# Patient Record
Sex: MALE | Race: WHITE | NOT HISPANIC OR LATINO | Employment: FULL TIME | URBAN - METROPOLITAN AREA
[De-identification: names, ages, dates, MRNs, and addresses within clinical notes are randomized per-mention and may not be internally consistent; named-entity substitution may affect disease eponyms.]

---

## 2021-09-17 ENCOUNTER — HOSPITAL ENCOUNTER (EMERGENCY)
Facility: HOSPITAL | Age: 61
Discharge: ANOTHER HOSPITAL | End: 2021-09-17
Attending: EMERGENCY MEDICINE | Admitting: EMERGENCY MEDICINE

## 2021-09-17 ENCOUNTER — APPOINTMENT (OUTPATIENT)
Dept: CARDIOLOGY | Facility: HOSPITAL | Age: 61
End: 2021-09-17

## 2021-09-17 ENCOUNTER — APPOINTMENT (OUTPATIENT)
Dept: CT IMAGING | Facility: HOSPITAL | Age: 61
End: 2021-09-17

## 2021-09-17 ENCOUNTER — HOSPITAL ENCOUNTER (INPATIENT)
Facility: HOSPITAL | Age: 61
LOS: 2 days | Discharge: HOME | End: 2021-09-19
Attending: INTERNAL MEDICINE | Admitting: INTERNAL MEDICINE

## 2021-09-17 VITALS
HEIGHT: 70 IN | DIASTOLIC BLOOD PRESSURE: 76 MMHG | OXYGEN SATURATION: 93 % | HEART RATE: 65 BPM | TEMPERATURE: 97.5 F | BODY MASS INDEX: 30.78 KG/M2 | RESPIRATION RATE: 15 BRPM | SYSTOLIC BLOOD PRESSURE: 116 MMHG | WEIGHT: 215 LBS

## 2021-09-17 DIAGNOSIS — R55 SYNCOPE, UNSPECIFIED SYNCOPE TYPE: Primary | ICD-10-CM

## 2021-09-17 DIAGNOSIS — I26.09 OTHER ACUTE PULMONARY EMBOLISM WITH ACUTE COR PULMONALE (HCC): ICD-10-CM

## 2021-09-17 LAB
ALBUMIN SERPL-MCNC: 4 G/DL (ref 3.5–5.2)
ALBUMIN/GLOB SERPL: 1.3 G/DL
ALP SERPL-CCNC: 105 U/L (ref 39–117)
ALT SERPL W P-5'-P-CCNC: 22 U/L (ref 1–41)
ANION GAP SERPL CALCULATED.3IONS-SCNC: 15 MMOL/L (ref 5–15)
APTT PPP: 22.8 SECONDS (ref 61–76.5)
AST SERPL-CCNC: 40 U/L (ref 1–40)
BASOPHILS # BLD AUTO: 0.1 10*3/MM3 (ref 0–0.2)
BASOPHILS NFR BLD AUTO: 0.8 % (ref 0–1.5)
BH CV ECHO MEAS - ACS: 2.1 CM
BH CV ECHO MEAS - AO MAX PG (FULL): 0.8 MMHG
BH CV ECHO MEAS - AO MAX PG: 5.7 MMHG
BH CV ECHO MEAS - AO MEAN PG (FULL): 0.52 MMHG
BH CV ECHO MEAS - AO MEAN PG: 2.3 MMHG
BH CV ECHO MEAS - AO ROOT AREA (BSA CORRECTED): 1.4
BH CV ECHO MEAS - AO ROOT AREA: 6.9 CM^2
BH CV ECHO MEAS - AO ROOT DIAM: 3 CM
BH CV ECHO MEAS - AO V2 MAX: 118.9 CM/SEC
BH CV ECHO MEAS - AO V2 MEAN: 68.8 CM/SEC
BH CV ECHO MEAS - AO V2 VTI: 19.4 CM
BH CV ECHO MEAS - ASC AORTA: 3.2 CM
BH CV ECHO MEAS - AVA(I,A): 3.3 CM^2
BH CV ECHO MEAS - AVA(I,D): 3.3 CM^2
BH CV ECHO MEAS - AVA(V,A): 3 CM^2
BH CV ECHO MEAS - AVA(V,D): 3 CM^2
BH CV ECHO MEAS - BSA(HAYCOCK): 2.2 M^2
BH CV ECHO MEAS - BSA: 2.2 M^2
BH CV ECHO MEAS - BZI_BMI: 30.8 KILOGRAMS/M^2
BH CV ECHO MEAS - BZI_METRIC_HEIGHT: 177.8 CM
BH CV ECHO MEAS - BZI_METRIC_WEIGHT: 97.5 KG
BH CV ECHO MEAS - EDV(CUBED): 87.5 ML
BH CV ECHO MEAS - EDV(MOD-SP4): 86.3 ML
BH CV ECHO MEAS - EDV(TEICH): 89.6 ML
BH CV ECHO MEAS - EF(CUBED): 80.5 %
BH CV ECHO MEAS - EF(MOD-BP): 70 %
BH CV ECHO MEAS - EF(MOD-SP4): 77.5 %
BH CV ECHO MEAS - EF(TEICH): 73.1 %
BH CV ECHO MEAS - ESV(CUBED): 17.1 ML
BH CV ECHO MEAS - ESV(MOD-SP4): 19.4 ML
BH CV ECHO MEAS - ESV(TEICH): 24 ML
BH CV ECHO MEAS - FS: 42 %
BH CV ECHO MEAS - IVS/LVPW: 1
BH CV ECHO MEAS - IVSD: 1.2 CM
BH CV ECHO MEAS - LA DIMENSION(2D): 3.2 CM
BH CV ECHO MEAS - LV DIASTOLIC VOL/BSA (35-75): 40.1 ML/M^2
BH CV ECHO MEAS - LV MASS(C)D: 196.8 GRAMS
BH CV ECHO MEAS - LV MASS(C)DI: 91.4 GRAMS/M^2
BH CV ECHO MEAS - LV MAX PG: 4.9 MMHG
BH CV ECHO MEAS - LV MEAN PG: 1.8 MMHG
BH CV ECHO MEAS - LV SYSTOLIC VOL/BSA (12-30): 9 ML/M^2
BH CV ECHO MEAS - LV V1 MAX: 110.2 CM/SEC
BH CV ECHO MEAS - LV V1 MEAN: 59.5 CM/SEC
BH CV ECHO MEAS - LV V1 VTI: 20 CM
BH CV ECHO MEAS - LVIDD: 4.4 CM
BH CV ECHO MEAS - LVIDS: 2.6 CM
BH CV ECHO MEAS - LVOT AREA: 3.2 CM^2
BH CV ECHO MEAS - LVOT DIAM: 2 CM
BH CV ECHO MEAS - LVPWD: 1.2 CM
BH CV ECHO MEAS - MV A MAX VEL: 60.5 CM/SEC
BH CV ECHO MEAS - MV DEC SLOPE: 220.9 CM/SEC^2
BH CV ECHO MEAS - MV DEC TIME: 0.3 SEC
BH CV ECHO MEAS - MV E MAX VEL: 65.3 CM/SEC
BH CV ECHO MEAS - MV E/A: 1.1
BH CV ECHO MEAS - MV MAX PG: 2.1 MMHG
BH CV ECHO MEAS - MV MEAN PG: 0.72 MMHG
BH CV ECHO MEAS - MV V2 MAX: 72.6 CM/SEC
BH CV ECHO MEAS - MV V2 MEAN: 39.7 CM/SEC
BH CV ECHO MEAS - MV V2 VTI: 19.8 CM
BH CV ECHO MEAS - MVA(VTI): 3.3 CM^2
BH CV ECHO MEAS - PA MAX PG (FULL): 0.78 MMHG
BH CV ECHO MEAS - PA MAX PG: 1.8 MMHG
BH CV ECHO MEAS - PA MEAN PG (FULL): 0.51 MMHG
BH CV ECHO MEAS - PA MEAN PG: 1.1 MMHG
BH CV ECHO MEAS - PA V2 MAX: 68 CM/SEC
BH CV ECHO MEAS - PA V2 MEAN: 49.5 CM/SEC
BH CV ECHO MEAS - PA V2 VTI: 16.2 CM
BH CV ECHO MEAS - PULM A REVS DUR: 0.14 SEC
BH CV ECHO MEAS - PULM A REVS VEL: 39.2 CM/SEC
BH CV ECHO MEAS - PULM DIAS VEL: 42.1 CM/SEC
BH CV ECHO MEAS - PULM S/D: 1.3
BH CV ECHO MEAS - PULM SYS VEL: 55.6 CM/SEC
BH CV ECHO MEAS - PVA(I,A): 1.2 CM^2
BH CV ECHO MEAS - PVA(I,D): 1.2 CM^2
BH CV ECHO MEAS - PVA(V,A): 1.4 CM^2
BH CV ECHO MEAS - PVA(V,D): 1.4 CM^2
BH CV ECHO MEAS - QP/QS: 0.29
BH CV ECHO MEAS - RAP SYSTOLE: 15 MMHG
BH CV ECHO MEAS - RV MAX PG: 1.1 MMHG
BH CV ECHO MEAS - RV MEAN PG: 0.56 MMHG
BH CV ECHO MEAS - RV V1 MAX: 51.7 CM/SEC
BH CV ECHO MEAS - RV V1 MEAN: 34.6 CM/SEC
BH CV ECHO MEAS - RV V1 VTI: 10.5 CM
BH CV ECHO MEAS - RVDD: 3.7 CM
BH CV ECHO MEAS - RVOT AREA: 1.8 CM^2
BH CV ECHO MEAS - RVOT DIAM: 1.5 CM
BH CV ECHO MEAS - RVSP: 77.4 MMHG
BH CV ECHO MEAS - SI(AO): 62.2 ML/M^2
BH CV ECHO MEAS - SI(CUBED): 32.7 ML/M^2
BH CV ECHO MEAS - SI(LVOT): 30 ML/M^2
BH CV ECHO MEAS - SI(MOD-SP4): 31.1 ML/M^2
BH CV ECHO MEAS - SI(TEICH): 30.4 ML/M^2
BH CV ECHO MEAS - SV(AO): 133.9 ML
BH CV ECHO MEAS - SV(CUBED): 70.4 ML
BH CV ECHO MEAS - SV(LVOT): 64.6 ML
BH CV ECHO MEAS - SV(MOD-SP4): 66.9 ML
BH CV ECHO MEAS - SV(RVOT): 18.9 ML
BH CV ECHO MEAS - SV(TEICH): 65.5 ML
BH CV ECHO MEAS - TR MAX VEL: 394.8 CM/SEC
BILIRUB SERPL-MCNC: 0.5 MG/DL (ref 0–1.2)
BUN SERPL-MCNC: 19 MG/DL (ref 6–20)
BUN/CREAT SERPL: 14.7 (ref 7–25)
CALCIUM SPEC-SCNC: 9 MG/DL (ref 8.6–10.5)
CHLORIDE SERPL-SCNC: 103 MMOL/L (ref 98–107)
CO2 SERPL-SCNC: 22 MMOL/L (ref 22–29)
CREAT SERPL-MCNC: 1.29 MG/DL (ref 0.76–1.27)
D DIMER PPP FEU-MCNC: 10.58 MG/L (FEU) (ref 0–0.59)
DEPRECATED RDW RBC AUTO: 40.7 FL (ref 37–54)
EOSINOPHIL # BLD AUTO: 0.2 10*3/MM3 (ref 0–0.4)
EOSINOPHIL NFR BLD AUTO: 2.1 % (ref 0.3–6.2)
ERYTHROCYTE [DISTWIDTH] IN BLOOD BY AUTOMATED COUNT: 13 % (ref 12.3–15.4)
GFR SERPL CREATININE-BSD FRML MDRD: 57 ML/MIN/1.73
GLOBULIN UR ELPH-MCNC: 3.2 GM/DL
GLUCOSE SERPL-MCNC: 129 MG/DL (ref 65–99)
HCT VFR BLD AUTO: 42.1 % (ref 37.5–51)
HGB BLD-MCNC: 14.3 G/DL (ref 13–17.7)
INR PPP: 1.03 (ref 0.93–1.1)
LV EF 2D ECHO EST: 70 %
LYMPHOCYTES # BLD AUTO: 2.6 10*3/MM3 (ref 0.7–3.1)
LYMPHOCYTES NFR BLD AUTO: 31.7 % (ref 19.6–45.3)
MCH RBC QN AUTO: 30.7 PG (ref 26.6–33)
MCHC RBC AUTO-ENTMCNC: 34 G/DL (ref 31.5–35.7)
MCV RBC AUTO: 90.3 FL (ref 79–97)
MONOCYTES # BLD AUTO: 0.7 10*3/MM3 (ref 0.1–0.9)
MONOCYTES NFR BLD AUTO: 8.8 % (ref 5–12)
NEUTROPHILS NFR BLD AUTO: 4.7 10*3/MM3 (ref 1.7–7)
NEUTROPHILS NFR BLD AUTO: 56.6 % (ref 42.7–76)
NRBC BLD AUTO-RTO: 0 /100 WBC (ref 0–0.2)
PLATELET # BLD AUTO: 151 10*3/MM3 (ref 140–450)
PMV BLD AUTO: 7.7 FL (ref 6–12)
POTASSIUM SERPL-SCNC: 3.6 MMOL/L (ref 3.5–5.2)
PROT SERPL-MCNC: 7.2 G/DL (ref 6–8.5)
PROTHROMBIN TIME: 11.4 SECONDS (ref 9.6–11.7)
RBC # BLD AUTO: 4.67 10*6/MM3 (ref 4.14–5.8)
SARS-COV-2 RNA PNL SPEC NAA+PROBE: NOT DETECTED
SODIUM SERPL-SCNC: 140 MMOL/L (ref 136–145)
TROPONIN T SERPL-MCNC: <0.01 NG/ML (ref 0–0.03)
WBC # BLD AUTO: 8.2 10*3/MM3 (ref 3.4–10.8)

## 2021-09-17 PROCEDURE — 85379 FIBRIN DEGRADATION QUANT: CPT | Performed by: EMERGENCY MEDICINE

## 2021-09-17 PROCEDURE — 25010000002 HEPARIN (PORCINE) 25000-0.45 UT/250ML-% SOLUTION: Performed by: EMERGENCY MEDICINE

## 2021-09-17 PROCEDURE — 80061 LIPID PANEL: CPT | Performed by: INTERNAL MEDICINE

## 2021-09-17 PROCEDURE — 71275 CT ANGIOGRAPHY CHEST: CPT

## 2021-09-17 PROCEDURE — 96361 HYDRATE IV INFUSION ADD-ON: CPT

## 2021-09-17 PROCEDURE — 96366 THER/PROPH/DIAG IV INF ADDON: CPT

## 2021-09-17 PROCEDURE — 96376 TX/PRO/DX INJ SAME DRUG ADON: CPT

## 2021-09-17 PROCEDURE — 96365 THER/PROPH/DIAG IV INF INIT: CPT

## 2021-09-17 PROCEDURE — 93306 TTE W/DOPPLER COMPLETE: CPT | Performed by: INTERNAL MEDICINE

## 2021-09-17 PROCEDURE — 85025 COMPLETE CBC W/AUTO DIFF WBC: CPT | Performed by: EMERGENCY MEDICINE

## 2021-09-17 PROCEDURE — 80053 COMPREHEN METABOLIC PANEL: CPT | Performed by: EMERGENCY MEDICINE

## 2021-09-17 PROCEDURE — 93005 ELECTROCARDIOGRAM TRACING: CPT | Performed by: EMERGENCY MEDICINE

## 2021-09-17 PROCEDURE — 84484 ASSAY OF TROPONIN QUANT: CPT | Performed by: EMERGENCY MEDICINE

## 2021-09-17 PROCEDURE — 93306 TTE W/DOPPLER COMPLETE: CPT

## 2021-09-17 PROCEDURE — 85610 PROTHROMBIN TIME: CPT | Performed by: EMERGENCY MEDICINE

## 2021-09-17 PROCEDURE — 0 IOPAMIDOL PER 1 ML: Performed by: EMERGENCY MEDICINE

## 2021-09-17 PROCEDURE — 99283 EMERGENCY DEPT VISIT LOW MDM: CPT

## 2021-09-17 PROCEDURE — 85730 THROMBOPLASTIN TIME PARTIAL: CPT | Performed by: EMERGENCY MEDICINE

## 2021-09-17 PROCEDURE — 93005 ELECTROCARDIOGRAM TRACING: CPT

## 2021-09-17 PROCEDURE — 87635 SARS-COV-2 COVID-19 AMP PRB: CPT | Performed by: EMERGENCY MEDICINE

## 2021-09-17 PROCEDURE — 86140 C-REACTIVE PROTEIN: CPT | Performed by: INTERNAL MEDICINE

## 2021-09-17 RX ORDER — SODIUM CHLORIDE, SODIUM LACTATE, POTASSIUM CHLORIDE, CALCIUM CHLORIDE 600; 310; 30; 20 MG/100ML; MG/100ML; MG/100ML; MG/100ML
125 INJECTION, SOLUTION INTRAVENOUS CONTINUOUS
Status: DISCONTINUED | OUTPATIENT
Start: 2021-09-17 | End: 2021-09-17 | Stop reason: HOSPADM

## 2021-09-17 RX ORDER — IBUPROFEN 200 MG
200 TABLET ORAL EVERY 6 HOURS PRN
COMMUNITY
End: 2021-09-19 | Stop reason: HOSPADM

## 2021-09-17 RX ORDER — ESCITALOPRAM OXALATE 20 MG/1
20 TABLET ORAL DAILY
COMMUNITY
Start: 2021-09-15

## 2021-09-17 RX ORDER — LORATADINE 10 MG/1
10 TABLET ORAL DAILY
COMMUNITY

## 2021-09-17 RX ORDER — HEPARIN SODIUM 10000 [USP'U]/100ML
15.4 INJECTION, SOLUTION INTRAVENOUS
Status: DISCONTINUED | OUTPATIENT
Start: 2021-09-17 | End: 2021-09-17 | Stop reason: HOSPADM

## 2021-09-17 RX ORDER — TRIAMCINOLONE ACETONIDE 55 UG/1
2 SPRAY, METERED NASAL DAILY
COMMUNITY

## 2021-09-17 RX ADMIN — HEPARIN SODIUM 15.4 UNITS/KG/HR: 10000 INJECTION, SOLUTION INTRAVENOUS at 15:05

## 2021-09-17 RX ADMIN — SODIUM CHLORIDE, POTASSIUM CHLORIDE, SODIUM LACTATE AND CALCIUM CHLORIDE 125 ML/HR: 600; 310; 30; 20 INJECTION, SOLUTION INTRAVENOUS at 13:18

## 2021-09-17 RX ADMIN — IOPAMIDOL 100 ML: 755 INJECTION, SOLUTION INTRAVENOUS at 14:16

## 2021-09-17 NOTE — ED NOTES
Spoke with [facility] about completing imaging on the pt. [facility] stated that they would come take care of it.     Milagros Hines, RN  09/17/21 0956

## 2021-09-17 NOTE — ED NOTES
Spoke with Interventional Cardiology answering service who stated that they were not on call. Informed them that we have already spoken to Cardiology here at [facility].     Milagros Hines, RN  09/17/21 3044

## 2021-09-17 NOTE — ED PROVIDER NOTES
Subjective   History of Present Illness  Context: 60-year-old male presents with complaints of syncope.  He states he had been pushing his lawnmower.  He had brief palpitation.  He states he was sweaty and had some shortness of breath.  He states that he was walking and then woke up on the ground.  He states he did not have a warning that he was going to pass out.  He states he noted some shortness of breath beginning of [place] but thought it was related to the altitude he states that he felt okay following that actually did cardio for a couple days but then noticed some shortness of breath last night.  He states he worked out this morning and developed shortness of breath again.  He does not complain of pain.  Location: Generalized  Quality: Syncope  Duration: Today  Timin episode  Severity: Occurred without warning   Modifying factors:  Associated signs and symptoms: Dyspnea reports he has had some congestion that he was taken Claritin for and Nasalide    Review of Systems   Constitutional: Positive for diaphoresis. Negative for fever and unexpected weight change.   HENT: Positive for congestion. Negative for sore throat.    Eyes: Negative for pain.   Respiratory: Positive for shortness of breath. Negative for cough.    Cardiovascular: Negative for chest pain and leg swelling.   Gastrointestinal: Negative for abdominal pain, blood in stool, diarrhea and vomiting.   Genitourinary: Negative for dysuria and urgency.   Musculoskeletal: Negative for back pain.   Skin: Negative for rash.   Neurological: Positive for syncope. Negative for dizziness, weakness and headaches.   Psychiatric/Behavioral: Negative for confusion.       No past medical history on file.  Depression  Allergies   Allergen Reactions   • Penicillins Rash       No past surgical history on file.    No family history on file.    Social History     Socioeconomic History   • Marital status:      Spouse name: Not on file   •  Number of children: Not on file   • Years of education: Not on file   • Highest education level: Not on file     Current medications Lexapro creatine Claritin Nasalide      Objective   Physical Exam  60-year-old male awake alert.  Generally pale.  Pupils equal round at light.  Oropharynx mucous membranes moist neck supple chest clear cardiovascular rate and rhythm abdomen soft without localized tenderness mass rebound or guarding extremities no tenderness edema.  Skin without rash noted.  Neurologic exam without focal findings noted motor sensory grossly intact to extremities  Procedures           ED Course      Results for orders placed or performed during the hospital encounter of 09/17/21   Comprehensive Metabolic Panel    Specimen: Blood   Result Value Ref Range    Glucose 129 (H) 65 - 99 mg/dL    BUN 19 6 - 20 mg/dL    Creatinine 1.29 (H) 0.76 - 1.27 mg/dL    Sodium 140 136 - 145 mmol/L    Potassium 3.6 3.5 - 5.2 mmol/L    Chloride 103 98 - 107 mmol/L    CO2 22.0 22.0 - 29.0 mmol/L    Calcium 9.0 8.6 - 10.5 mg/dL    Total Protein 7.2 6.0 - 8.5 g/dL    Albumin 4.00 3.50 - 5.20 g/dL    ALT (SGPT) 22 1 - 41 U/L    AST (SGOT) 40 1 - 40 U/L    Alkaline Phosphatase 105 39 - 117 U/L    Total Bilirubin 0.5 0.0 - 1.2 mg/dL    eGFR Non African Amer 57 (L) >60 mL/min/1.73    Globulin 3.2 gm/dL    A/G Ratio 1.3 g/dL    BUN/Creatinine Ratio 14.7 7.0 - 25.0    Anion Gap 15.0 5.0 - 15.0 mmol/L   Troponin    Specimen: Blood   Result Value Ref Range    Troponin T <0.010 0.000 - 0.030 ng/mL   D-dimer, Quantitative    Specimen: Blood   Result Value Ref Range    D-Dimer, Quantitative 10.58 (H) 0.00 - 0.59 mg/L (FEU)   CBC Auto Differential    Specimen: Blood   Result Value Ref Range    WBC 8.20 3.40 - 10.80 10*3/mm3    RBC 4.67 4.14 - 5.80 10*6/mm3    Hemoglobin 14.3 13.0 - 17.7 g/dL    Hematocrit 42.1 37.5 - 51.0 %    MCV 90.3 79.0 - 97.0 fL    MCH 30.7 26.6 - 33.0 pg    MCHC 34.0 31.5 - 35.7 g/dL    RDW 13.0 12.3 - 15.4 %     "RDW-SD 40.7 37.0 - 54.0 fl    MPV 7.7 6.0 - 12.0 fL    Platelets 151 140 - 450 10*3/mm3    Neutrophil % 56.6 42.7 - 76.0 %    Lymphocyte % 31.7 19.6 - 45.3 %    Monocyte % 8.8 5.0 - 12.0 %    Eosinophil % 2.1 0.3 - 6.2 %    Basophil % 0.8 0.0 - 1.5 %    Neutrophils, Absolute 4.70 1.70 - 7.00 10*3/mm3    Lymphocytes, Absolute 2.60 0.70 - 3.10 10*3/mm3    Monocytes, Absolute 0.70 0.10 - 0.90 10*3/mm3    Eosinophils, Absolute 0.20 0.00 - 0.40 10*3/mm3    Basophils, Absolute 0.10 0.00 - 0.20 10*3/mm3    nRBC 0.0 0.0 - 0.2 /100 WBC   Protime-INR    Specimen: Blood   Result Value Ref Range    Protime 11.4 9.6 - 11.7 Seconds    INR 1.03 0.93 - 1.10   aPTT    Specimen: Blood   Result Value Ref Range    PTT 22.8 (L) 61.0 - 76.5 seconds   ECG 12 Lead   Result Value Ref Range    QT Interval 430 ms     CT Chest Pulmonary Embolism    Result Date: 9/17/2021  Extensive bilateral pulmonary emboli with evidence of right heart strain.  Electronically Signed By-Dillan Terrell MD On:9/17/2021 2:35 PM This report was finalized on [id number] by  Dillan Terrell MD.    Medications   lactated ringers infusion (125 mL/hr Intravenous Currently Infusing 9/17/21 1543)   heparin 66310 units/250 mL (100 units/mL) in 0.45 % NaCl infusion (15.4 Units/kg/hr × 97.5 kg Intravenous New Bag 9/17/21 1505)   heparin bolus from bag 3,900 Units (has no administration in time range)   heparin bolus from bag 7,800 Units (has no administration in time range)   iopamidol (ISOVUE-370) 76 % injection 100 mL (100 mL Intravenous Given 9/17/21 1416)   heparin bolus from bag 7,800 Units (7,800 Units Intravenous Bolus from Bag 9/17/21 1507)     BP 99/61   Pulse 63   Temp 97.4 °F (36.3 °C) (Oral)   Resp 16   Ht 177.8 cm (70\")   Wt 97.5 kg (215 lb)   SpO2 90%   BMI 30.85 kg/m²                                        MDM  Chart review: No previous records to review  Comorbidity: As per past history  Differential: Syncope, arrhythmia, anemia, pulmonary embolus,  My " EKG interpretation: Sinus rhythm without previous to compare to  Lab: Troponin less than 0.010 comprehensive metabolic panel BUN 19 creatinine 1.29 CBC normal, PTT 22.8 INR 1.0 D-dimer 10.58  Radiology: I reviewed CT chest PE protocol.  There is extensive bilateral pulmonary emboli with evidence of right heart strain this was discussed with radiologist.  Discussion/treatment: Findings were discussed with patient and wife.  Patient was started on high-dose heparin bolus and infusion.  He had been started on IV fluids.  Patient was discussed with  interventional cardiology.  After discussion we will try to arrange transfer to [facility] where he could have intervention performed.  He will have echocardiogram performed.  Patient was evaluated using appropriate PPE      Final diagnoses:   Syncope, unspecified syncope type   Other acute pulmonary embolism with acute cor pulmonale (CMS/HCC)       ED Disposition  ED Disposition     ED Disposition Condition Comment    Transfer to Another Facility             No follow-up provider specified.       Medication List      No changes were made to your prescriptions during this visit.          Chris Vaughan MD  09/17/21 3574

## 2021-09-17 NOTE — ED NOTES
Report given to Silas RN at [facility].      Cris Velasco RN  09/17/21 5170

## 2021-09-18 PROBLEM — I26.99 PULMONARY EMBOLI (HCC): Status: ACTIVE | Noted: 2021-09-18

## 2021-09-18 LAB
ACT BLD: 142 SECONDS (ref 82–152)
ACT BLD: 213 SECONDS (ref 82–152)
APTT PPP: 59.5 SECONDS (ref 22.7–35.4)
BASOPHILS # BLD AUTO: 0.04 10*3/MM3 (ref 0–0.2)
BASOPHILS NFR BLD AUTO: 0.6 % (ref 0–1.5)
CHOLEST SERPL-MCNC: 176 MG/DL (ref 0–200)
CRP SERPL-MCNC: 0.4 MG/DL (ref 0–0.5)
DEPRECATED RDW RBC AUTO: 40.9 FL (ref 37–54)
EOSINOPHIL # BLD AUTO: 0.16 10*3/MM3 (ref 0–0.4)
EOSINOPHIL NFR BLD AUTO: 2.3 % (ref 0.3–6.2)
ERYTHROCYTE [DISTWIDTH] IN BLOOD BY AUTOMATED COUNT: 12.3 % (ref 12.3–15.4)
FIBRINOGEN PPP-MCNC: 266 MG/DL (ref 219–464)
HCT VFR BLD AUTO: 40.8 % (ref 37.5–51)
HCT VFR BLDA CALC: 37 % (ref 38–51)
HDLC SERPL-MCNC: 33 MG/DL (ref 40–60)
HGB BLD-MCNC: 13.8 G/DL (ref 13–17.7)
HGB BLDA-MCNC: 12.6 G/DL (ref 12–17)
IMM GRANULOCYTES # BLD AUTO: 0.02 10*3/MM3 (ref 0–0.05)
IMM GRANULOCYTES NFR BLD AUTO: 0.3 % (ref 0–0.5)
INR PPP: 1.11 (ref 0.9–1.1)
LDLC SERPL CALC-MCNC: 123 MG/DL (ref 0–100)
LDLC/HDLC SERPL: 3.66 {RATIO}
LYMPHOCYTES # BLD AUTO: 1.59 10*3/MM3 (ref 0.7–3.1)
LYMPHOCYTES NFR BLD AUTO: 22.9 % (ref 19.6–45.3)
MCH RBC QN AUTO: 30.6 PG (ref 26.6–33)
MCHC RBC AUTO-ENTMCNC: 33.8 G/DL (ref 31.5–35.7)
MCV RBC AUTO: 90.5 FL (ref 79–97)
MONOCYTES # BLD AUTO: 0.68 10*3/MM3 (ref 0.1–0.9)
MONOCYTES NFR BLD AUTO: 9.8 % (ref 5–12)
NEUTROPHILS NFR BLD AUTO: 4.46 10*3/MM3 (ref 1.7–7)
NEUTROPHILS NFR BLD AUTO: 64.1 % (ref 42.7–76)
NRBC BLD AUTO-RTO: 0 /100 WBC (ref 0–0.2)
PLATELET # BLD AUTO: 130 10*3/MM3 (ref 140–450)
PMV BLD AUTO: 9.8 FL (ref 6–12)
PROTHROMBIN TIME: 14.1 SECONDS (ref 11.7–14.2)
RBC # BLD AUTO: 4.51 10*6/MM3 (ref 4.14–5.8)
SAO2 % BLDA: 75 % (ref 95–98)
TRIGL SERPL-MCNC: 111 MG/DL (ref 0–150)
VLDLC SERPL-MCNC: 20 MG/DL (ref 5–40)
WBC # BLD AUTO: 6.95 10*3/MM3 (ref 3.4–10.8)

## 2021-09-18 PROCEDURE — 25010000002 HEPARIN (PORCINE) PER 1000 UNITS: Performed by: INTERNAL MEDICINE

## 2021-09-18 PROCEDURE — 84155 ASSAY OF PROTEIN SERUM: CPT | Performed by: INTERNAL MEDICINE

## 2021-09-18 PROCEDURE — 85300 ANTITHROMBIN III ACTIVITY: CPT | Performed by: INTERNAL MEDICINE

## 2021-09-18 PROCEDURE — 93451 RIGHT HEART CATH: CPT | Performed by: INTERNAL MEDICINE

## 2021-09-18 PROCEDURE — C1894 INTRO/SHEATH, NON-LASER: HCPCS | Performed by: INTERNAL MEDICINE

## 2021-09-18 PROCEDURE — 83883 ASSAY NEPHELOMETRY NOT SPEC: CPT | Performed by: INTERNAL MEDICINE

## 2021-09-18 PROCEDURE — C1769 GUIDE WIRE: HCPCS | Performed by: INTERNAL MEDICINE

## 2021-09-18 PROCEDURE — 85018 HEMOGLOBIN: CPT

## 2021-09-18 PROCEDURE — 99223 1ST HOSP IP/OBS HIGH 75: CPT | Performed by: INTERNAL MEDICINE

## 2021-09-18 PROCEDURE — 85347 COAGULATION TIME ACTIVATED: CPT

## 2021-09-18 PROCEDURE — 85014 HEMATOCRIT: CPT

## 2021-09-18 PROCEDURE — 85384 FIBRINOGEN ACTIVITY: CPT | Performed by: INTERNAL MEDICINE

## 2021-09-18 PROCEDURE — 85670 THROMBIN TIME PLASMA: CPT | Performed by: INTERNAL MEDICINE

## 2021-09-18 PROCEDURE — B31S1ZZ FLUOROSCOPY OF RIGHT PULMONARY ARTERY USING LOW OSMOLAR CONTRAST: ICD-10-PCS | Performed by: INTERNAL MEDICINE

## 2021-09-18 PROCEDURE — 93568 NJX CAR CTH NSLC P-ART ANGRP: CPT | Performed by: INTERNAL MEDICINE

## 2021-09-18 PROCEDURE — 85610 PROTHROMBIN TIME: CPT | Performed by: INTERNAL MEDICINE

## 2021-09-18 PROCEDURE — B31T1ZZ FLUOROSCOPY OF LEFT PULMONARY ARTERY USING LOW OSMOLAR CONTRAST: ICD-10-PCS | Performed by: INTERNAL MEDICINE

## 2021-09-18 PROCEDURE — 25010000002 FENTANYL CITRATE (PF) 50 MCG/ML SOLUTION: Performed by: INTERNAL MEDICINE

## 2021-09-18 PROCEDURE — 86334 IMMUNOFIX E-PHORESIS SERUM: CPT | Performed by: INTERNAL MEDICINE

## 2021-09-18 PROCEDURE — 25010000002 MIDAZOLAM PER 1 MG: Performed by: INTERNAL MEDICINE

## 2021-09-18 PROCEDURE — 85306 CLOT INHIBIT PROT S FREE: CPT | Performed by: INTERNAL MEDICINE

## 2021-09-18 PROCEDURE — C1757 CATH, THROMBECTOMY/EMBOLECT: HCPCS | Performed by: INTERNAL MEDICINE

## 2021-09-18 PROCEDURE — 81240 F2 GENE: CPT | Performed by: INTERNAL MEDICINE

## 2021-09-18 PROCEDURE — 85732 THROMBOPLASTIN TIME PARTIAL: CPT | Performed by: INTERNAL MEDICINE

## 2021-09-18 PROCEDURE — 86147 CARDIOLIPIN ANTIBODY EA IG: CPT | Performed by: INTERNAL MEDICINE

## 2021-09-18 PROCEDURE — 82784 ASSAY IGA/IGD/IGG/IGM EACH: CPT | Performed by: INTERNAL MEDICINE

## 2021-09-18 PROCEDURE — 0 IOPAMIDOL PER 1 ML: Performed by: INTERNAL MEDICINE

## 2021-09-18 PROCEDURE — 02CQ3ZZ EXTIRPATION OF MATTER FROM RIGHT PULMONARY ARTERY, PERCUTANEOUS APPROACH: ICD-10-PCS | Performed by: INTERNAL MEDICINE

## 2021-09-18 PROCEDURE — 86146 BETA-2 GLYCOPROTEIN ANTIBODY: CPT | Performed by: INTERNAL MEDICINE

## 2021-09-18 PROCEDURE — 02CR3ZZ EXTIRPATION OF MATTER FROM LEFT PULMONARY ARTERY, PERCUTANEOUS APPROACH: ICD-10-PCS | Performed by: INTERNAL MEDICINE

## 2021-09-18 PROCEDURE — 85730 THROMBOPLASTIN TIME PARTIAL: CPT | Performed by: INTERNAL MEDICINE

## 2021-09-18 PROCEDURE — 4A023N6 MEASUREMENT OF CARDIAC SAMPLING AND PRESSURE, RIGHT HEART, PERCUTANEOUS APPROACH: ICD-10-PCS | Performed by: INTERNAL MEDICINE

## 2021-09-18 PROCEDURE — 99255 IP/OBS CONSLTJ NEW/EST HI 80: CPT | Performed by: INTERNAL MEDICINE

## 2021-09-18 PROCEDURE — 99153 MOD SED SAME PHYS/QHP EA: CPT | Performed by: INTERNAL MEDICINE

## 2021-09-18 PROCEDURE — 85025 COMPLETE CBC W/AUTO DIFF WBC: CPT | Performed by: INTERNAL MEDICINE

## 2021-09-18 PROCEDURE — 85303 CLOT INHIBIT PROT C ACTIVITY: CPT | Performed by: INTERNAL MEDICINE

## 2021-09-18 PROCEDURE — 84165 PROTEIN E-PHORESIS SERUM: CPT | Performed by: INTERNAL MEDICINE

## 2021-09-18 PROCEDURE — 81241 F5 GENE: CPT | Performed by: INTERNAL MEDICINE

## 2021-09-18 PROCEDURE — 37184 PRIM ART M-THRMBC 1ST VSL: CPT | Performed by: INTERNAL MEDICINE

## 2021-09-18 PROCEDURE — 85613 RUSSELL VIPER VENOM DILUTED: CPT | Performed by: INTERNAL MEDICINE

## 2021-09-18 PROCEDURE — 85705 THROMBOPLASTIN INHIBITION: CPT | Performed by: INTERNAL MEDICINE

## 2021-09-18 PROCEDURE — C1760 CLOSURE DEV, VASC: HCPCS | Performed by: INTERNAL MEDICINE

## 2021-09-18 PROCEDURE — 99152 MOD SED SAME PHYS/QHP 5/>YRS: CPT | Performed by: INTERNAL MEDICINE

## 2021-09-18 RX ORDER — HEPARIN SODIUM 10000 [USP'U]/100ML
18 INJECTION, SOLUTION INTRAVENOUS
Status: DISCONTINUED | OUTPATIENT
Start: 2021-09-18 | End: 2021-09-18

## 2021-09-18 RX ORDER — ONDANSETRON 4 MG/1
4 TABLET, FILM COATED ORAL EVERY 6 HOURS PRN
Status: DISCONTINUED | OUTPATIENT
Start: 2021-09-18 | End: 2021-09-19 | Stop reason: HOSPADM

## 2021-09-18 RX ORDER — NALOXONE HCL 0.4 MG/ML
0.4 VIAL (ML) INJECTION
Status: DISCONTINUED | OUTPATIENT
Start: 2021-09-18 | End: 2021-09-19 | Stop reason: HOSPADM

## 2021-09-18 RX ORDER — IBUPROFEN 400 MG/1
400 TABLET ORAL EVERY 4 HOURS PRN
Status: DISCONTINUED | OUTPATIENT
Start: 2021-09-18 | End: 2021-09-18

## 2021-09-18 RX ORDER — LIDOCAINE HYDROCHLORIDE 20 MG/ML
INJECTION, SOLUTION INFILTRATION; PERINEURAL AS NEEDED
Status: DISCONTINUED | OUTPATIENT
Start: 2021-09-18 | End: 2021-09-18 | Stop reason: HOSPADM

## 2021-09-18 RX ORDER — MIDAZOLAM HYDROCHLORIDE 1 MG/ML
INJECTION INTRAMUSCULAR; INTRAVENOUS AS NEEDED
Status: DISCONTINUED | OUTPATIENT
Start: 2021-09-18 | End: 2021-09-18 | Stop reason: HOSPADM

## 2021-09-18 RX ORDER — SODIUM CHLORIDE 9 MG/ML
125 INJECTION, SOLUTION INTRAVENOUS CONTINUOUS
Status: DISCONTINUED | OUTPATIENT
Start: 2021-09-18 | End: 2021-09-19 | Stop reason: HOSPADM

## 2021-09-18 RX ORDER — ZOLPIDEM TARTRATE 5 MG/1
5 TABLET ORAL NIGHTLY PRN
Status: DISCONTINUED | OUTPATIENT
Start: 2021-09-18 | End: 2021-09-19 | Stop reason: HOSPADM

## 2021-09-18 RX ORDER — ONDANSETRON 2 MG/ML
4 INJECTION INTRAMUSCULAR; INTRAVENOUS EVERY 6 HOURS PRN
Status: DISCONTINUED | OUTPATIENT
Start: 2021-09-18 | End: 2021-09-19 | Stop reason: HOSPADM

## 2021-09-18 RX ORDER — SODIUM CHLORIDE 9 MG/ML
125 INJECTION, SOLUTION INTRAVENOUS CONTINUOUS
Status: ACTIVE | OUTPATIENT
Start: 2021-09-18 | End: 2021-09-19

## 2021-09-18 RX ORDER — SODIUM CHLORIDE 9 MG/ML
INJECTION, SOLUTION INTRAVENOUS CONTINUOUS PRN
Status: COMPLETED | OUTPATIENT
Start: 2021-09-18 | End: 2021-09-18

## 2021-09-18 RX ORDER — ESCITALOPRAM OXALATE 20 MG/1
20 TABLET ORAL DAILY
Status: DISCONTINUED | OUTPATIENT
Start: 2021-09-18 | End: 2021-09-19 | Stop reason: HOSPADM

## 2021-09-18 RX ORDER — FENTANYL CITRATE 50 UG/ML
INJECTION, SOLUTION INTRAMUSCULAR; INTRAVENOUS AS NEEDED
Status: DISCONTINUED | OUTPATIENT
Start: 2021-09-18 | End: 2021-09-18 | Stop reason: HOSPADM

## 2021-09-18 RX ORDER — HYDROCODONE BITARTRATE AND ACETAMINOPHEN 5; 325 MG/1; MG/1
1 TABLET ORAL EVERY 4 HOURS PRN
Status: DISCONTINUED | OUTPATIENT
Start: 2021-09-18 | End: 2021-09-19 | Stop reason: HOSPADM

## 2021-09-18 RX ORDER — HEPARIN SODIUM 1000 [USP'U]/ML
INJECTION, SOLUTION INTRAVENOUS; SUBCUTANEOUS AS NEEDED
Status: DISCONTINUED | OUTPATIENT
Start: 2021-09-18 | End: 2021-09-18 | Stop reason: HOSPADM

## 2021-09-18 RX ORDER — NITROGLYCERIN 0.4 MG/1
0.4 TABLET SUBLINGUAL
Status: DISCONTINUED | OUTPATIENT
Start: 2021-09-18 | End: 2021-09-19 | Stop reason: HOSPADM

## 2021-09-18 RX ORDER — MORPHINE SULFATE 2 MG/ML
1 INJECTION, SOLUTION INTRAMUSCULAR; INTRAVENOUS EVERY 4 HOURS PRN
Status: DISCONTINUED | OUTPATIENT
Start: 2021-09-18 | End: 2021-09-19 | Stop reason: HOSPADM

## 2021-09-18 RX ORDER — HEPARIN SODIUM 5000 [USP'U]/ML
40-80 INJECTION, SOLUTION INTRAVENOUS; SUBCUTANEOUS EVERY 6 HOURS PRN
Status: DISCONTINUED | OUTPATIENT
Start: 2021-09-18 | End: 2021-09-18

## 2021-09-18 RX ADMIN — SODIUM CHLORIDE 125 ML/HR: 9 INJECTION, SOLUTION INTRAVENOUS at 15:00

## 2021-09-18 RX ADMIN — APIXABAN 10 MG: 5 TABLET, FILM COATED ORAL at 16:35

## 2021-09-18 RX ADMIN — SODIUM CHLORIDE 125 ML/HR: 9 INJECTION, SOLUTION INTRAVENOUS at 07:01

## 2021-09-18 RX ADMIN — ESCITALOPRAM 20 MG: 20 TABLET, FILM COATED ORAL at 16:35

## 2021-09-18 RX ADMIN — HEPARIN SODIUM 15.4 UNITS/KG/HR: 10000 INJECTION, SOLUTION INTRAVENOUS at 03:22

## 2021-09-18 NOTE — NURSING NOTE
Patient handoff between paramedics and this nurse was performed at 2220 at bedside. Per the patient record, heparin drip was continued at 15.4 units/kg/hr during patient transport from [facility] to [facility].  While awaiting admission and new provider orders (and per [facility] order prior to transfer), the patient's heparin was resumed at 15.4 units/kg/hr.  Second nurse verification was obtained from Ciro Garber RN.

## 2021-09-18 NOTE — H&P
Date of Hospital Visit: 21  Encounter Provider: Enrike Cantu MD  Place of Service: [facility]  Patient Name: Christopher Gupta  :1960  [id number]  Referral Provider: Chester Olson MD    Chief complaint: Syncope    History of Present Illness:    This is a 60 year old male patient without significant health history who was cutting his grass and suddenly felt sweaty and short of breath then passed out. He did not have any warning, he just woke up on the ground. He reports previous shortness of breath back in [place] and assumed it was related to the altitude. He does regular cardio but did notice the last day or two that he was more short of breath than usual. Troponin negative, EKG normal sinus with PVCs, D-dimer 10.58.    He underwent CTA of the chest which revealed extensive bilateral pulmonary emboli with evidence of right heart strain. Echocardiogram showed EF 70%, LV wall thickness consistent with mild concentric hypertrophy, right ventricular cavity severely dilated, moderately reducted right ventricular systolic function, RVSP >55 mmHg, moderate to severe pulmonary hypertension, right atrial cavity is mild to moderately dilated, RV contractile morphology consistent with a positive Alfaro sign.     He was started on heparin drip and transferred to [facility] for intervention.     Is a previously healthy gentleman usually very active exercises regularly without limitation.  He drove about a month ago to [place] straight through and after that really just has not been quite right has had some shortness of breath that he attributed maybe to a little bit of weight gain then he has been okay then not okay just has really struggled exercising Monday he noticed a little bit of pain in his right leg and then yesterday he was cutting the grass and he had an sudden without warning syncopal episode scraped his left knee a little bit  did not hurt anything else came to the emergency room and is found to have submassive pulmonary embolus with RV strain severe pulmonary hypertension interestingly his troponin is negative.  He has never had clotting issues he has not had any injury to his legs he is not obese there is really no reason that he would have a clot there is no family history of clotting issues otherwise has been healthy    Previous cardiac testing:     Echocardiogram 09/17/2021:  · Left ventricular wall thickness is consistent with mild concentric hypertrophy.  · Estimated left ventricular EF = 70% Estimated left ventricular EF was in agreement with the calculated left ventricular EF. Left ventricular systolic function is normal.  · The right ventricular cavity is severely dilated.  · Moderately reduced right ventricular systolic function noted.  · Estimated right ventricular systolic pressure from tricuspid regurgitation is markedly elevated (>55 mmHg).  · Moderate to severe pulmonary hypertension is present.  · The right atrial cavity is mild to moderately dilated.  · RV contractile morphology consistent with a positive Alfaro sign      No past medical history on file.    No past surgical history on file.    Medications Prior to Admission   Medication Sig Dispense Refill Last Dose   • escitalopram (LEXAPRO) 20 MG tablet Take 20 mg by mouth Daily.   9/17/2021 at Unknown time   • ibuprofen (ADVIL,MOTRIN) 200 MG tablet Take 200 mg by mouth Every 6 (Six) Hours As Needed for Mild Pain .   9/17/2021 at Unknown time   • loratadine (Claritin) 10 MG tablet Take 10 mg by mouth Daily.   9/17/2021 at Unknown time   • Triamcinolone Acetonide (NASACORT) 55 MCG/ACT nasal inhaler 2 sprays into the nostril(s) as directed by provider Daily.   9/17/2021 at Unknown time       Current Meds  Scheduled Meds:   Continuous Infusions:heparin (porcine), 18 Units/kg/hr, Last Rate: 15.4 Units/kg/hr (09/18/21 0322)  sodium chloride, 125 mL/hr, Last Rate: 125  mL/hr (09/18/21 0701)      PRN Meds:.•  heparin (porcine)  •  ibuprofen  •  nitroglycerin  •  zolpidem    Allergies as of 09/17/2021 - Reviewed 09/17/2021   Allergen Reaction Noted   • Penicillins Rash 09/17/2021       Social History     Socioeconomic History   • Marital status:      Spouse name: Not on file   • Number of children: Not on file   • Years of education: Not on file   • Highest education level: Not on file       No family history on file.    REVIEW OF SYSTEMS:   ROS was performed and is negative except as outlined in HPI     REVIEW OF SYSTEMS:   CONSTITUTIONAL: No weight loss, fever, chills, weakness or fatigue.   HEENT: Eyes: No visual loss, blurred vision, double vision or yellow sclerae. Ears, Nose, Throat: No hearing loss, sneezing, congestion, runny nose or sore throat.   SKIN: No rash or itching.     RESPIRATORY: No shortness of breath, hemoptysis, cough or sputum.   GASTROINTESTINAL: No anorexia, nausea, vomiting or diarrhea. No abdominal pain, bright red blood per rectum or melena.  GENITOURINARY: No burning on urination, hematuria or increased frequency.  NEUROLOGICAL: No headache, dizziness, syncope, paralysis, ataxia, numbness or tingling in the extremities. No change in bowel or bladder control.   MUSCULOSKELETAL: No muscle, back pain, joint pain or stiffness.   HEMATOLOGIC: No anemia, bleeding or bruising.   LYMPHATICS: No enlarged nodes. No history of splenectomy.   PSYCHIATRIC: No history of depression, anxiety, hallucinations.   ENDOCRINOLOGIC: No reports of sweating, cold or heat intolerance. No polyuria or polydipsia.        Objective:   Temp:  [97.4 °F (36.3 °C)-97.6 °F (36.4 °C)] 97.6 °F (36.4 °C)  Heart Rate:  [54-71] 58  Resp:  [15-28] 15  BP: ()/(56-81) 129/81  There is no height or weight on file to calculate BMI.    Vitals:    09/17/21 2252   BP: 129/81   Pulse: 58   Resp: 15   Temp: 97.6 °F (36.4 °C)   SpO2: 92%       Head:    Normocephalic, without obvious  abnormality, atraumatic   Eyes:            Lids and lashes normal, conjunctivae and sclerae normal, no   icterus, no pallor   Ears:    Ears appear intact with no abnormalities noted   Throat:   No oral lesions, dentition good   Neck:   No adenopathy, supple, trachea midline, no thyromegaly, no   carotid bruit, no JVD   Lungs:     Breath sounds are equal and clear to auscultation    Heart:    Normal S1 and loud S2, RRR, No M/G/R   Abdomen:    Normal bowel sounds, no masses, no organomegaly, soft, nontender,       nondistended, no guarding   Extremities:   Moves all extremities well, no edema, no cyanosis, no redness   Pulses:   Pulses palpable and equal bilaterally.    Skin:  Psychiatric:   No bleeding, bruising or rash    Awake, alert and oriented x 3, normal mood and affect           Admission EKG 09/17/2021:      I personally viewed and interpreted the patient's EKG/Telemetry data    Assessment:  There are no hospital problems to display for this patient.      Plan: This is a gentleman with a high risk submassive pulmonary embolus his syncope is very concerning that he probably had transient low or no flow and his CT he is got large amount of central pulmonary embolus in both pulmonary arteries this may have been going on for as long as a month and yesterday may have been another event or or may have been the primary event it is difficult to tell either way I think that he would benefit from an aggressive invasive approach and I think that an inari embolectomy is the initial approach we would take with this I have discussed the risk versus benefits of this with him and his wife at length we will have perfusion on standby if we were to have complications.  I am going to have hematology see him also for this unprovoked PE although he did have a period of stasis    Enrike Cantu MD  09/18/21  08:30 EDT.

## 2021-09-18 NOTE — CONSULTS
Subjective MASSIVE PULMONARY EMBOLISM WITH SEVERE PULMONARY HYPERTENSION, LIKELY CLOTH IN RLE WITH FREQUENT SHOWERS OF EMBOLI SINCE July 24/21 AFTER 20 HS DRIVE TO [place].          History of Present Illness  I had the opportunity to review this patient today in consultation, a delightful 60-year-old white male who was mowing his grass yesterday and after completion of his task, he was feeling significant amount of shortness of breath and chest pressure and he had a syncopal episode. This was witnessed by his wife, 911 was called and he was brought to [facility] Emergency Room where a diagnosis of a massive pulmonary embolism was made. The patient was placed on heparin and [facility]. Dr. Cantu took care of him after he has undergone an embolectomy. The patient is now receiving Eliquis. His symptoms pertinent to yesterday are dramatically better. He is no longer having any shortness of breath, chest pain, pressure, cough, hemoptysis or pleuritic pain. He has no palpitations.     It happens to be that the patient drove all the way on his own to [place] 20 hours, stopping only for gas and food on 1 occasion. During the trip he developed a significant so-called charley horse in the right lower extremity calf that remained persistent and then disappeared for a few days while in [place]. He gained some weight and he thought that altitude was giving him shortness of breath. Upon returning to [place] a few days later the patient’s shortness of breath persisted and when he was doing his workouts he was feeling significantly decreased in his ability to function including treadmills and weightlifting. Some days last week the symptoms became so bad that he had to stop doing his physical activity because he was very short winded. He developed pain again in the right calf close to the Achilles and that persisted for at least 2 days, taking some Aleve with mild improvement. He has  not had any swelling that he could tell before but now he has had some swelling in the last few days. The left calf has been normal. The patient has not had any fever or chills. He also thought that he had some respiratory allergies and he blamed this to the picture. In fact he has not had too much rhinorrhea, sneezing, itching or anything of this nature. He also has had some loose bowel activity for a few days. He has not had any passage of blood in the stool. Urination has been normal. He gained weight, almost 20 pounds while being in [place]. He lost almost 6 to 10 back into [place] modifying his diet and physical activity. He has not had any rash in the skin. No joint pain. No neurological symptomatology.    This afternoon he feels perfectly back to normal baseline but he is resting in the bed after his procedure.      The patient has no history of hypertension, diabetes, hyperlipidemia. He has no history of asthma. He has history of upper respiratory allergies. He has had colonoscopy in the past, being negative normal and PSA has been checked being negative normal. He never has had skin cancer. He has not had any other medical illness. He is perfectly healthy and typically he does not take any medicines. He takes occasional vitamins. He never has been exposed to anabolic steroids.      Social History     Socioeconomic History   • Marital status:      Spouse name: Not on file   • Number of children: Not on file   • Years of education: Not on file   • Highest education level: Not on file    APPENDECTOMY  His father is alive and in good health. His mother is alive and in good health. They have no history of thrombophilia. One brother committed suicide a long time ago, another brother has hypertension. Not completely sure if his brother has had blood clots. He has 3 children, boy, girl, boy. One of the boys has autism. The girl stays in [place] in college. Nobody in the family as far as he knows,  nephews, siblings, uncles, aunts, and so forth have had any thrombophilia.          Review of Systems   Constitutional: Positive for activity change and fatigue.   HENT: Negative.    Respiratory: Positive for cough and shortness of breath.    Cardiovascular: Positive for chest pain and leg swelling.   Gastrointestinal: Negative.    Endocrine: Negative.    Genitourinary: Negative.    Musculoskeletal: Negative.    Skin: Negative.    Neurological: Negative.    Hematological: Negative.    Psychiatric/Behavioral: Negative.           Medications:    Current Facility-Administered Medications on File Prior to Encounter   Medication Dose Route Frequency Provider Last Rate Last Admin   • [COMPLETED] heparin bolus from bag 7,800 Units  80 Units/kg Intravenous Once Chris Vaughan MD   7,800 Units at 09/17/21 1507   • [COMPLETED] iopamidol (ISOVUE-370) 76 % injection 100 mL  100 mL Intravenous Once in imaging Chris Vaughan MD   100 mL at 09/17/21 1416   • [DISCONTINUED] heparin 28248 units/250 mL (100 units/mL) in 0.45 % NaCl infusion  15.4 Units/kg/hr Intravenous Titrated Chris Vaughan MD   Transferred to External Facility at 09/17/21 2307   • [DISCONTINUED] heparin bolus from bag 3,900 Units  40 Units/kg Intravenous Q6H PRN Chris Vaughan MD       • [DISCONTINUED] heparin bolus from bag 7,800 Units  80 Units/kg Intravenous Q6H PRN Chris Vaughan MD       • [DISCONTINUED] lactated ringers infusion  125 mL/hr Intravenous Continuous Chris Vaughan MD   Stopped at 09/17/21 1745     Current Outpatient Medications on File Prior to Encounter   Medication Sig Dispense Refill   • escitalopram (LEXAPRO) 20 MG tablet Take 20 mg by mouth Daily.     • ibuprofen (ADVIL,MOTRIN) 200 MG tablet Take 200 mg by mouth Every 6 (Six) Hours As Needed for Mild Pain .     • loratadine (Claritin) 10 MG tablet Take 10 mg by mouth Daily.     • Triamcinolone Acetonide (NASACORT) 55 MCG/ACT nasal inhaler 2 sprays into the nostril(s) as  directed by provider Daily.       ALLERGIES:    Allergies   Allergen Reactions   • Penicillins Rash       Objective      Vitals:    09/18/21 1145 09/18/21 1230 09/18/21 1245 09/18/21 1300   BP:  112/74 113/70 114/76   BP Location:       Patient Position:       Pulse:  53 54 54   Resp: 15      Temp:       TempSrc:       SpO2:  96% 94% 94%     No flowsheet data found.    Physical Exam  Vitals reviewed.   Constitutional:       General: He is not in acute distress.     Appearance: Normal appearance. He is normal weight. He is not ill-appearing, toxic-appearing or diaphoretic.   HENT:      Head: Normocephalic.      Nose: Nose normal.      Mouth/Throat:      Mouth: Mucous membranes are moist.      Pharynx: Oropharynx is clear.   Eyes:      General: No scleral icterus.        Right eye: No discharge.         Left eye: No discharge.   Cardiovascular:      Rate and Rhythm: Normal rate and regular rhythm.      Pulses: Normal pulses.      Heart sounds: Normal heart sounds. No murmur heard.   No friction rub. No gallop.    Pulmonary:      Effort: Pulmonary effort is normal. No respiratory distress.      Breath sounds: Normal breath sounds. No stridor. No wheezing, rhonchi or rales.   Chest:      Chest wall: No tenderness.   Abdominal:      General: Abdomen is flat. There is no distension.      Palpations: Abdomen is soft. There is no mass.      Tenderness: There is no abdominal tenderness. There is no guarding or rebound.   Musculoskeletal:         General: Tenderness present. No deformity.      Cervical back: No rigidity or tenderness.      Right lower leg: Edema present.      Left lower leg: No edema.   Lymphadenopathy:      Cervical: No cervical adenopathy.   Skin:     General: Skin is warm and dry.      Capillary Refill: Capillary refill takes 2 to 3 seconds.      [place]: Skin is not jaundiced.      Findings: No bruising or lesion.   Neurological:      General: No focal deficit present.      Mental Status: He is alert  and oriented to person, place, and time.   Psychiatric:         Mood and Affect: Mood normal.         Behavior: Behavior normal.         Thought Content: Thought content normal.         Judgment: Judgment normal.               RECENT LABS:  Hematology WBC   Date Value Ref Range Status   09/18/2021 6.95 3.40 - 10.80 10*3/mm3 Final     RBC   Date Value Ref Range Status   09/18/2021 4.51 4.14 - 5.80 10*6/mm3 Final     Hemoglobin   Date Value Ref Range Status   09/18/2021 12.6 12.0 - 17.0 g/dL Final   09/18/2021 13.8 13.0 - 17.7 g/dL Final     Hematocrit   Date Value Ref Range Status   09/18/2021 37 (L) 38 - 51 % Final   09/18/2021 40.8 37.5 - 51.0 % Final     Platelets   Date Value Ref Range Status   09/18/2021 130 (L) 140 - 450 10*3/mm3 Final      DATE OF EXAM:  9/17/2021 2:00 PM     PROCEDURE:  CT CHEST PULMONARY EMBOLISM-     INDICATIONS:   pain     COMPARISON:   No Comparisons Available     TECHNIQUE:  Routine transaxial slices were obtained through chest after  administration of intravenous 100 ml of Isovue 370. Reconstructed  coronal and sagittal images were also obtained. Automated exposure  control and iterative reconstruction methods were used.      FINDINGS:  The lungs appear clear. There is no mediastinal or hilar  lymphadenopathy. There are large bilateral pulmonary emboli present. On  the right, there is a large embolus filling the distal right main  pulmonary artery and extending into the upper and lower lobe pulmonary  arteries. On the left, there is a large embolus at the bifurcation into  the upper and lower pole renal arteries with a large amount of post  filling the proximal lower lobe pulmonary artery. The upper abdomen  appears normal is mild right heart dilatation suggesting right heart  strain.     IMPRESSION:  Extensive bilateral pulmonary emboli with evidence of right heart  strain.     Electronically Signed By-Dillan Terrell MD On:9/17/2021 2:35 PM      CARDIAC CATHETERIZATION DR CHUN:  This  report was finalized on [id number] by  Dillan Terrell MD.  Pulmonary angiography was then performed.  Following that we brought an Amplatz extra-support wire up and positioned that into the inferior branch of the right pulmonary artery.   I advanced the Aspiration Guide Catheter (AGC) up into the right pulmonary artery.  We then did aspiration thrombectomy x4 with a tremendous visual removal of thrombus.   We did aspiration until there was no further removal of embolus.  We did an angiogram through the AGC catheter and it revealed essentially all of the embolus had been removed.  I then exchanged out the AGC and brought the BFC up and floated it into the left pulmonary artery.  A at that point we then brought the Glidewire advantage up into the left lung and I then placed the Grollman catheter in the left lung and pulmonary angiography was performed.  We then used a Glidewire advantage to get into the inferior segment of the lung and we exchanged out and position the AGC into the left lung over an Amplatz extra-support wire. Aspiration thrombectomy of the left lung was performed using the AGC x2.  We continue to aspirate until no further embolus was returned.  I then did an angiogram through the AGC catheter and it also removed that the vast majority of the embolus have been removed.  The BFC was repositioned into the pulmonary artery and repeat pulmonary pressures and pulmonary arterial saturation were obtained.  At that point we felt we had accomplished everything we could with this and we removed the catheter.  We then removed the 24 Latvian dry seal sheath and deployed the preclosed suture with great results and hemostasis was obtained.  The patient tolerated the procedure well without early complication.      FINDINGS:  Right heart cath                              Pre                                    Post                              Pressure  RA                       6                                         6  RV                       52/14                                35/9  PA                       52/15 mean 25                 35/8 mean 18     PA sat                  70                                      75     Pulmonary angiography:  Right pulmonary artery: Large amount of central embolus involving all segments of the right pulmonary artery, delayed perfusion through the entire right lung except for the very superior portion     Post angiogram no embolus and normal perfusion throughout the right lung     Left pulmonary artery: Large central embolus mainly in the mid and inferior segment of the left pulmonary artery with reduced perfusion     Post angiogram no embolus and normal perfusion throughout the left lung     Summary: Highly successful pulmonary embolectomy bilaterally.  Will hold the heparin for 4 hours and then start Eliquis tonight when ask the hematologist to see him for a hypercoagulable evaluation.  Hopefully he may even be able to go home tomorrow.    Assessment/Plan      I had the opportunity to see this delightful  today. Obviously the story of this patient is very typical. He drove to [place] on his own for 20 hours, not stopping. He developed pain in the calf of the right lower extremity that he called charley horse. He has shortness of breath presumed to be related to altitude sickness in [place] and upon returning to the [place] the shortness of breath remained ongoing and became worse. Since then he has had times that he has been able to do his workouts with no major difficulties and later on his workouts have been minimized given the shortness of breath produced by minimal physical activity. A few days ago he had a new pain in the right calf close to the Achilles tendon. While mowing the grass yesterday he had syncope and very likely the explanation of all this is his pulmonary vasculature could not accommodate anymore blood clots and the flow through the left ventricle from the  pulmonary veins was minimized producing syncope. The patient is alive by miracle. The procedure done by Dr. Cantu and stated above is more than dramatic in the description and tells us the amount of clots that this patient had. I pointed out to the patient that it is a miracle that he stayed alive. Many people go through this and they do not live to tell a story. Therefore, if somebody needs to have flowers and chocolates, it is Dr. Cantu and the emergency room team at [facility]. I think the quick transition and the care that this patient received is world quality and deserves to be credited.     The patient has no personal history of thrombophilia before. He has no family history of thrombophilia. He has not been taking anabolic steroids. His white count, hemoglobin and platelets are normal. His platelet count is minimally low because of platelet consumption associated with massive amount of clotting. Chemistry profile is normal. The CT angiogram from [facility] has been reviewed. Eventually we will need to ask the radiologist to amend the report because he mentioned something in the renal artery that probably is a type error. The description by Dr. Cantu has been reviewed.     The patient is already receiving Eliquis and I advised him that he will remain on Eliquis at least for a year and in my opinion probably for the rest of his life. Obviously we need to proceed with thrombophilia assessment but I think in my opinion the most likely factor that triggered all this was the long trip to [place]. He probably has had showers and showers and showers of clots and emboli and his pulmonary circulation could not accommodate anymore clots. He ran out of physical space to put more clots and the circulation into the left side of the heart through the pulmonary veins ran out. I pointed out to him that it is like if the main water entrance of the water to the house has a clog, the rest of the  house is not going to have any water. In any event the patient is up and running. He looks terrific. He has no clinical evidence of pulmonary hypertension on clinical examination and it is amazing that he is alive. I think the tolerance to all this is because he is a very fit individual who exercises all the time.     Obviously I am going to need to proceed with thrombophilia assessment that will include factor V Leiden mutation, prothrombin gene mutation, antithrombin III, lupus anticoagulant, anti-glycoprotein antibody profile, anticardiolipin antibody profile as well as protein C, protein S, fibrinogen, lipid profile as well as lupus anticoagulant. I will perform as well a serum protein electrophoresis and serum free light chains.     The patient has had screening colonoscopy in the past, prostate examination being negative normal. No family history of malignancy. No family history of thrombophilia. I need to see this patient in the office in a couple of weeks to go through the report of his laboratory testing. Eventually we will need to repeat a CT angiogram of the chest to be sure that all of his clots in the pulmonary circulation are clean and I am sure Dr. Cantu eventually will perform another echocardiogram to be sure that there is resolution of his pulmonary hypertension by this methodology.     I pointed out to the patient that he will need to stay away from trauma and I made the description in the common sense utilization at this point.     The patient likes to drink his beers. He does not get drunk but I pointed out that anticoagulants are not good friends of alcohol and his wife will take care of this problem.     He raised the question if sex life will have anything to do with all these issues in regard to inability to perform and I pointed out to him that should not be an issue. I asked him to give himself a break until things get better in the next couple of weeks.     I will review him tomorrow.      I pointed out to the patient the gravity of his situation and how ill he was.    DURING THE VISIT WITH THE PATIENT TODAY , PATIENT HAD FACE MASK, I HAD PROPER PROTECTIVE EQUIPMENT, AND I DID HAND HYGIENE WITH SOAP AND WATER BEFORE AND AFTER THE VISIT.              9/18/2021      CC:

## 2021-09-18 NOTE — PLAN OF CARE
Goal Outcome Evaluation:  Plan of Care Reviewed With: patient, spouse        Progress: no change  Outcome Summary: Patient was transferred to [address] [place] [address] from [facility] with pulmonary emboli on heparin drip at 15.4 units/kg/hr.  PTT's being monitored and heparin titrated per protocol. Sinus bradycardia, otherwise vitals stable.  Up with standby assist due to complaint of syncope p/t admission.  NPO at this time pending possible procedure w/cardiology. Will conitnue to monitor.

## 2021-09-19 ENCOUNTER — APPOINTMENT (OUTPATIENT)
Dept: CARDIOLOGY | Facility: HOSPITAL | Age: 61
End: 2021-09-19

## 2021-09-19 VITALS
RESPIRATION RATE: 16 BRPM | TEMPERATURE: 98.4 F | HEART RATE: 57 BPM | DIASTOLIC BLOOD PRESSURE: 80 MMHG | SYSTOLIC BLOOD PRESSURE: 127 MMHG | WEIGHT: 215.5 LBS | BODY MASS INDEX: 30.92 KG/M2 | OXYGEN SATURATION: 95 %

## 2021-09-19 LAB
ALBUMIN SERPL-MCNC: 3.1 G/DL (ref 3.5–5.2)
ALBUMIN/GLOB SERPL: 1.2 G/DL
ALP SERPL-CCNC: 80 U/L (ref 39–117)
ALT SERPL W P-5'-P-CCNC: 18 U/L (ref 1–41)
ANION GAP SERPL CALCULATED.3IONS-SCNC: 7 MMOL/L (ref 5–15)
APTT PPP: 32.5 SECONDS (ref 22.7–35.4)
AST SERPL-CCNC: 24 U/L (ref 1–40)
BASOPHILS # BLD AUTO: 0.03 10*3/MM3 (ref 0–0.2)
BASOPHILS NFR BLD AUTO: 0.5 % (ref 0–1.5)
BH CV LOW VAS RIGHT DISTAL FEMORAL SPONT: 1
BH CV LOW VAS RIGHT PERONEAL VESSEL: 1
BH CV LOW VAS RIGHT POPLITEAL SPONT: 1
BH CV LOW VAS RIGHT POSTERIOR TIBIAL VESSEL: 1
BH CV LOW VAS RIGHT SOLEAL VESSEL: 1
BH CV LOWER VASCULAR LEFT COMMON FEMORAL AUGMENT: NORMAL
BH CV LOWER VASCULAR LEFT COMMON FEMORAL COMPETENT: NORMAL
BH CV LOWER VASCULAR LEFT COMMON FEMORAL COMPRESS: NORMAL
BH CV LOWER VASCULAR LEFT COMMON FEMORAL PHASIC: NORMAL
BH CV LOWER VASCULAR LEFT COMMON FEMORAL SPONT: NORMAL
BH CV LOWER VASCULAR LEFT DISTAL FEMORAL COMPRESS: NORMAL
BH CV LOWER VASCULAR LEFT GASTRONEMIUS COMPRESS: NORMAL
BH CV LOWER VASCULAR LEFT GREATER SAPH AK COMPRESS: NORMAL
BH CV LOWER VASCULAR LEFT GREATER SAPH BK COMPRESS: NORMAL
BH CV LOWER VASCULAR LEFT LESSER SAPH COMPRESS: NORMAL
BH CV LOWER VASCULAR LEFT MID FEMORAL AUGMENT: NORMAL
BH CV LOWER VASCULAR LEFT MID FEMORAL COMPETENT: NORMAL
BH CV LOWER VASCULAR LEFT MID FEMORAL COMPRESS: NORMAL
BH CV LOWER VASCULAR LEFT MID FEMORAL PHASIC: NORMAL
BH CV LOWER VASCULAR LEFT MID FEMORAL SPONT: NORMAL
BH CV LOWER VASCULAR LEFT PERONEAL COMPRESS: NORMAL
BH CV LOWER VASCULAR LEFT POPLITEAL AUGMENT: NORMAL
BH CV LOWER VASCULAR LEFT POPLITEAL COMPETENT: NORMAL
BH CV LOWER VASCULAR LEFT POPLITEAL COMPRESS: NORMAL
BH CV LOWER VASCULAR LEFT POPLITEAL PHASIC: NORMAL
BH CV LOWER VASCULAR LEFT POPLITEAL SPONT: NORMAL
BH CV LOWER VASCULAR LEFT POSTERIOR TIBIAL COMPRESS: NORMAL
BH CV LOWER VASCULAR LEFT PROFUNDA FEMORAL COMPRESS: NORMAL
BH CV LOWER VASCULAR LEFT PROXIMAL FEMORAL COMPRESS: NORMAL
BH CV LOWER VASCULAR LEFT SAPHENOFEMORAL JUNCTION COMPRESS: NORMAL
BH CV LOWER VASCULAR RIGHT COMMON FEMORAL AUGMENT: NORMAL
BH CV LOWER VASCULAR RIGHT COMMON FEMORAL COMPETENT: NORMAL
BH CV LOWER VASCULAR RIGHT COMMON FEMORAL COMPRESS: NORMAL
BH CV LOWER VASCULAR RIGHT COMMON FEMORAL PHASIC: NORMAL
BH CV LOWER VASCULAR RIGHT COMMON FEMORAL SPONT: NORMAL
BH CV LOWER VASCULAR RIGHT DISTAL FEMORAL COMPRESS: NORMAL
BH CV LOWER VASCULAR RIGHT DISTAL FEMORAL THROMBUS: NORMAL
BH CV LOWER VASCULAR RIGHT GASTRONEMIUS COMPRESS: NORMAL
BH CV LOWER VASCULAR RIGHT GREATER SAPH AK COMPRESS: NORMAL
BH CV LOWER VASCULAR RIGHT GREATER SAPH BK COMPRESS: NORMAL
BH CV LOWER VASCULAR RIGHT LESSER SAPH COMPRESS: NORMAL
BH CV LOWER VASCULAR RIGHT MID FEMORAL AUGMENT: NORMAL
BH CV LOWER VASCULAR RIGHT MID FEMORAL COMPETENT: NORMAL
BH CV LOWER VASCULAR RIGHT MID FEMORAL COMPRESS: NORMAL
BH CV LOWER VASCULAR RIGHT MID FEMORAL PHASIC: NORMAL
BH CV LOWER VASCULAR RIGHT MID FEMORAL SPONT: NORMAL
BH CV LOWER VASCULAR RIGHT PERONEAL COMPRESS: NORMAL
BH CV LOWER VASCULAR RIGHT PERONEAL THROMBUS: NORMAL
BH CV LOWER VASCULAR RIGHT POPLITEAL AUGMENT: NORMAL
BH CV LOWER VASCULAR RIGHT POPLITEAL COMPETENT: NORMAL
BH CV LOWER VASCULAR RIGHT POPLITEAL COMPRESS: NORMAL
BH CV LOWER VASCULAR RIGHT POPLITEAL PHASIC: NORMAL
BH CV LOWER VASCULAR RIGHT POPLITEAL SPONT: NORMAL
BH CV LOWER VASCULAR RIGHT POPLITEAL THROMBUS: NORMAL
BH CV LOWER VASCULAR RIGHT POSTERIOR TIBIAL COMPRESS: NORMAL
BH CV LOWER VASCULAR RIGHT POSTERIOR TIBIAL THROMBUS: NORMAL
BH CV LOWER VASCULAR RIGHT PROFUNDA FEMORAL COMPRESS: NORMAL
BH CV LOWER VASCULAR RIGHT PROXIMAL FEMORAL COMPRESS: NORMAL
BH CV LOWER VASCULAR RIGHT SAPHENOFEMORAL JUNCTION COMPRESS: NORMAL
BH CV LOWER VASCULAR RIGHT SOLEAL COMPRESS: NORMAL
BH CV LOWER VASCULAR RIGHT SOLEAL THROMBUS: NORMAL
BILIRUB SERPL-MCNC: 0.3 MG/DL (ref 0–1.2)
BUN SERPL-MCNC: 12 MG/DL (ref 6–20)
BUN/CREAT SERPL: 11.4 (ref 7–25)
CALCIUM SPEC-SCNC: 8.3 MG/DL (ref 8.6–10.5)
CHLORIDE SERPL-SCNC: 109 MMOL/L (ref 98–107)
CO2 SERPL-SCNC: 23 MMOL/L (ref 22–29)
CREAT SERPL-MCNC: 1.05 MG/DL (ref 0.76–1.27)
DEPRECATED RDW RBC AUTO: 41.6 FL (ref 37–54)
EOSINOPHIL # BLD AUTO: 0.29 10*3/MM3 (ref 0–0.4)
EOSINOPHIL NFR BLD AUTO: 4.4 % (ref 0.3–6.2)
ERYTHROCYTE [DISTWIDTH] IN BLOOD BY AUTOMATED COUNT: 12.5 % (ref 12.3–15.4)
GFR SERPL CREATININE-BSD FRML MDRD: 72 ML/MIN/1.73
GLOBULIN UR ELPH-MCNC: 2.6 GM/DL
GLUCOSE SERPL-MCNC: 90 MG/DL (ref 65–99)
HCT VFR BLD AUTO: 37.3 % (ref 37.5–51)
HGB BLD-MCNC: 12.6 G/DL (ref 13–17.7)
LYMPHOCYTES # BLD AUTO: 1.4 10*3/MM3 (ref 0.7–3.1)
LYMPHOCYTES NFR BLD AUTO: 21.3 % (ref 19.6–45.3)
MAXIMAL PREDICTED HEART RATE: 161 BPM
MCH RBC QN AUTO: 30.7 PG (ref 26.6–33)
MCHC RBC AUTO-ENTMCNC: 33.8 G/DL (ref 31.5–35.7)
MCV RBC AUTO: 90.8 FL (ref 79–97)
MONOCYTES # BLD AUTO: 0.73 10*3/MM3 (ref 0.1–0.9)
MONOCYTES NFR BLD AUTO: 11.1 % (ref 5–12)
NEUTROPHILS NFR BLD AUTO: 4.12 10*3/MM3 (ref 1.7–7)
NEUTROPHILS NFR BLD AUTO: 62.5 % (ref 42.7–76)
PLATELET # BLD AUTO: 144 10*3/MM3 (ref 140–450)
PMV BLD AUTO: 9.7 FL (ref 6–12)
POTASSIUM SERPL-SCNC: 4.1 MMOL/L (ref 3.5–5.2)
PROT SERPL-MCNC: 5.7 G/DL (ref 6–8.5)
QT INTERVAL: 430 MS
RBC # BLD AUTO: 4.11 10*6/MM3 (ref 4.14–5.8)
SODIUM SERPL-SCNC: 139 MMOL/L (ref 136–145)
STRESS TARGET HR: 137 BPM
WBC # BLD AUTO: 6.58 10*3/MM3 (ref 3.4–10.8)

## 2021-09-19 PROCEDURE — 80053 COMPREHEN METABOLIC PANEL: CPT | Performed by: INTERNAL MEDICINE

## 2021-09-19 PROCEDURE — 99232 SBSQ HOSP IP/OBS MODERATE 35: CPT | Performed by: INTERNAL MEDICINE

## 2021-09-19 PROCEDURE — 85025 COMPLETE CBC W/AUTO DIFF WBC: CPT | Performed by: INTERNAL MEDICINE

## 2021-09-19 PROCEDURE — 93970 EXTREMITY STUDY: CPT

## 2021-09-19 PROCEDURE — 99238 HOSP IP/OBS DSCHRG MGMT 30/<: CPT | Performed by: INTERNAL MEDICINE

## 2021-09-19 PROCEDURE — 85730 THROMBOPLASTIN TIME PARTIAL: CPT | Performed by: INTERNAL MEDICINE

## 2021-09-19 RX ADMIN — APIXABAN 10 MG: 5 TABLET, FILM COATED ORAL at 08:44

## 2021-09-19 RX ADMIN — ESCITALOPRAM 20 MG: 20 TABLET, FILM COATED ORAL at 08:44

## 2021-09-19 NOTE — DISCHARGE SUMMARY
Christopher Gupta  [id number]    Date of Admit: 9/17/2021  Date of Discharge:  9/19/2021    Discharge Diagnosis:  Active Hospital Problems    Diagnosis  POA   • Pulmonary emboli (CMS/HCC) [I26.99]  Yes      Resolved Hospital Problems   No resolved problems to display.       Hospital Course: Mr Gupta is a 60 year old male who presented to [facility] 9/17/21 with initial reports of shortness of breath and diaphoresis while cutting his grass. He stated [place] and attributed it to the altitude.     He was found to have extensive bilateral pulmonary emboli with evidence of right heart strain on imaging, and subsequently taken for [facility] retrieval. He has remained stable postoperatively and is appropriate for discharge home today. He will be started on Eliquis for PE and follow up in our office in 1 week.     I have reviewed the above.  This is a gentleman who really does not have a lot of risk factors for thrombus that came in several weeks after a long road trip possibly that played a role with syncope and a submassive pulmonary embolus with marked pulmonary hypertension.  He underwent highly successful and artery embolectomy with normalization of his pulmonary pressures.  Angiographic resolution of the PEs.  He is found to have pretty extensive DVT in his distal femoral vein down to his popliteal.  That will be managed with anticoagulation.  Hematology consultation has been seen and labs have been drawn.  He will get early follow-up with my nurse practitioner and I will see him in a month and at that time will need an echo also to follow-up his right-sided enlargement    Procedures Performed  Procedure(s):  Percutaneous Manual Thrombectomy- [facility]  Right Heart Cath  Pumonary angiography -Bilateral       Consults     Date and Time Order Name Status Description    9/18/2021  1:10 PM Hematology & Oncology Inpatient Consult Completed           Discharge Medications     Your  medication list      START taking these medications      Instructions Last Dose Given Next Dose Due   Apixaban Starter Pack tablet therapy pack      Take two 5 mg tablets by mouth every 12 hours for 7 days. Followed by one 5 mg tablet every 12 hours. (Dispense starter pack if available)          CONTINUE taking these medications      Instructions Last Dose Given Next Dose Due   Claritin 10 MG tablet  Generic drug: loratadine      Take 10 mg by mouth Daily.       escitalopram 20 MG tablet  Commonly known as: LEXAPRO      Take 20 mg by mouth Daily.       Triamcinolone Acetonide 55 MCG/ACT nasal inhaler  Commonly known as: NASACORT      2 sprays into the nostril(s) as directed by provider Daily.          STOP taking these medications    ibuprofen 200 MG tablet  Commonly known as: ADVIL,MOTRIN              Where to Get Your Medications      These medications were sent to [facility]/pharmacy #[id number] - [facility] [place] - [address] [place] [address] [phone] [place] [phone] FX  [address] [facility] [place] [address]    Hours: 24-hours Phone: [phone]   · Apixaban Starter Pack tablet therapy pack         Discharge Diet: regular    Activity at Discharge: as tolerates    Discharge disposition: home    Condition on Discharge: stable    Follow-up Appointments  No future appointments.  Additional Instructions for the Follow-ups that You Need to Schedule     Discharge Follow-up with Specified Provider: Dr Cantu; 1 Month   As directed      To: Dr Cantu    Follow Up: 1 Month         Discharge Follow-up with Specified Provider: LINA Harvey or LINA Mast; 1 Week   As directed      To: LINA Harvey or LINA Mast    Follow Up: 1 Week               Test Results Pending at Discharge  Pending Labs     Order Current Status    Antithrombin III In process    Beta-2 Glycoprotein Antibodies In process    Cardiolipin Antibody In process    Factor 5 Leiden In process    Factor II, DNA Analysis In process    ROCIO + PE In process     Immunoglobulin Free LT Chains Blood In process    Lupus Anticoagulant In process    Protein C Activity In process    Protein S Antigen, Free In process    Protein S Functional In process           Enrike Cantu MD  09/19/21  12:09 EDT

## 2021-09-19 NOTE — PLAN OF CARE
Goal Outcome Evaluation:  Plan of Care Reviewed With: patient        Progress: improving  Outcome Summary: Patient with DVT in right leg. Will be managed with Eliquis and follow up in one week. No pain or short of breath. Follow up with Dr. Washington as well.

## 2021-09-19 NOTE — PLAN OF CARE
Goal Outcome Evaluation:  Plan of Care Reviewed With: patient        Progress: improving   VSS on RA. No c/o pain. Up with stand by assistance. Patient ambulated once around the nursing unit. Doppler study today. Voiding per urinal, will continue to monitor.

## 2021-09-20 ENCOUNTER — TELEPHONE (OUTPATIENT)
Dept: ONCOLOGY | Facility: CLINIC | Age: 61
End: 2021-09-20

## 2021-09-20 DIAGNOSIS — I26.02 ACUTE SADDLE PULMONARY EMBOLISM WITH ACUTE COR PULMONALE (HCC): Primary | ICD-10-CM

## 2021-09-20 LAB
ACT BLD: 235 SECONDS (ref 82–152)
F5 GENE MUT ANL BLD/T: NORMAL
FACTOR II, DNA ANALYSIS: NORMAL
HCT VFR BLDA CALC: 38 % (ref 38–51)
HGB BLDA-MCNC: 12.9 G/DL (ref 12–17)
SAO2 % BLDA: 70 % (ref 95–98)

## 2021-09-20 NOTE — TELEPHONE ENCOUNTER
----- Message from Keena Pacheco RN sent at 9/20/2021  8:52 AM EDT -----    ----- Message -----  From: Adonay Washington MD  Sent: 9/19/2021  10:30 PM EDT  To: Kristyn Alfaro, [facility] Rep, #    2 u cbc cmp with me in 2 weeks

## 2021-09-20 NOTE — PAYOR COMM NOTE
"Christopher Gupta (60 y.o. Male)                              ATTENTION;       INITIAL CLINICALS FOR REVIEW, AUTH PENDING [id number]                         REPLY TO [facility]PT , JACKIE JEAN LPN  [phone] OR CALL          Date of Birth Social Security Number Address Home Phone MRN    1960  [address] [place] [address]  [id number]    Taoism Marital Status          Jew        Admission Date Admission Type Admitting Provider Attending Provider Department, Room/Bed    21 Urgent Chester Olson MD  [facility] CARDIOVASC UNIT,     Discharge Date Discharge Disposition Discharge Destination        2021 Home or Self Care              Attending Provider: (none)   Allergies: Penicillins    Isolation: None   Infection: None   Code Status: Not on file    Ht: 177.8 cm (70\")   Wt: 97.8 kg (215 lb 8 oz)    Admission Cmt: None   Principal Problem: None                Active Insurance as of 2021     Primary Coverage     Payor Plan Insurance Group Employer/Plan Group    [employer] BLUE [employer]OSS AN[employer]M BLUE CROSS BLUE [employer]IELD PPO [id number]     Payor Plan Address Payor Plan Phone Number Payor Plan Fax Number Effective Dates    [address] [phone]  2019 - None Entered    [place] [address]       Subscriber Name Subscriber Birth Date Member ID       CHRISTOPHER GUPTA 1960 [id number]                 Emergency Contacts      (Rel.) Home Phone Work Phone Mobile Phone    KARMEN GUPTA (Spouse) -- -- [phone]               History & Physical      Enrike Cantu MD at 21 0641          Date of Hospital Visit: 21  Encounter Provider: Enrike Cantu MD  Place of Service: [facility] CARDIOLOGY  Patient Name: Christopher Gupta  :1960  [id number]  Referral Provider: Chester Olson MD    Chief complaint: Syncope    History of Present Illness:    This is a 60 year old male patient " without significant health history who was cutting his grass and suddenly felt sweaty and short of breath then passed out. He did not have any warning, he just woke up on the ground. He reports previous shortness of breath back [place] and assumed it was related to the altitude. He does regular cardio but did notice the last day or two that he was more short of breath than usual. Troponin negative, EKG normal sinus with PVCs, D-dimer 10.58.    He underwent CTA of the chest which revealed extensive bilateral pulmonary emboli with evidence of right heart strain. Echocardiogram showed EF 70%, LV wall thickness consistent with mild concentric hypertrophy, right ventricular cavity severely dilated, moderately reducted right ventricular systolic function, RVSP >55 mmHg, moderate to severe pulmonary hypertension, right atrial cavity is mild to moderately dilated, RV contractile morphology consistent with a positive Alfaro sign.     He was started on heparin drip and transferred to [facility] for intervention.     Is a previously healthy gentleman usually very active exercises regularly without limitation.  He drove about a month ago to [place] straight through and after that really just has not been quite right has had some shortness of breath that he attributed maybe to a little bit of weight gain then he has been okay then not okay just has really struggled exercising Monday he noticed a little bit of pain in his right leg and then yesterday he was cutting the grass and he had an sudden without warning syncopal episode scraped his left knee a little bit did not hurt anything else came to the emergency room and is found to have submassive pulmonary embolus with RV strain severe pulmonary hypertension interestingly his troponin is negative.  He has never had clotting issues he has not had any injury to his legs he is not obese there is really no reason that he would have a clot there is no  family history of clotting issues otherwise has been healthy    Previous cardiac testing:     Echocardiogram 09/17/2021:  · Left ventricular wall thickness is consistent with mild concentric hypertrophy.  · Estimated left ventricular EF = 70% Estimated left ventricular EF was in agreement with the calculated left ventricular EF. Left ventricular systolic function is normal.  · The right ventricular cavity is severely dilated.  · Moderately reduced right ventricular systolic function noted.  · Estimated right ventricular systolic pressure from tricuspid regurgitation is markedly elevated (>55 mmHg).  · Moderate to severe pulmonary hypertension is present.  · The right atrial cavity is mild to moderately dilated.  · RV contractile morphology consistent with a positive Alfaro sign      No past medical history on file.    No past surgical history on file.    Medications Prior to Admission   Medication Sig Dispense Refill Last Dose   • escitalopram (LEXAPRO) 20 MG tablet Take 20 mg by mouth Daily.   9/17/2021 at Unknown time   • ibuprofen (ADVIL,MOTRIN) 200 MG tablet Take 200 mg by mouth Every 6 (Six) Hours As Needed for Mild Pain .   9/17/2021 at Unknown time   • loratadine (Claritin) 10 MG tablet Take 10 mg by mouth Daily.   9/17/2021 at Unknown time   • Triamcinolone Acetonide (NASACORT) 55 MCG/ACT nasal inhaler 2 sprays into the nostril(s) as directed by provider Daily.   9/17/2021 at Unknown time       Current Meds  Scheduled Meds:   Continuous Infusions:heparin (porcine), 18 Units/kg/hr, Last Rate: 15.4 Units/kg/hr (09/18/21 0322)  sodium chloride, 125 mL/hr, Last Rate: 125 mL/hr (09/18/21 0701)      PRN Meds:.•  heparin (porcine)  •  ibuprofen  •  nitroglycerin  •  zolpidem    Allergies as of 09/17/2021 - Reviewed 09/17/2021   Allergen Reaction Noted   • Penicillins Rash 09/17/2021       Social History     Socioeconomic History   • Marital status:      Spouse name: Not on file   • Number of children: Not  on file   • Years of education: Not on file   • Highest education level: Not on file       No family history on file.    REVIEW OF SYSTEMS:   ROS was performed and is negative except as outlined in HPI     REVIEW OF SYSTEMS:   CONSTITUTIONAL: No weight loss, fever, chills, weakness or fatigue.   HEENT: Eyes: No visual loss, blurred vision, double vision or yellow sclerae. Ears, Nose, Throat: No hearing loss, sneezing, congestion, runny nose or sore throat.   SKIN: No rash or itching.     RESPIRATORY: No shortness of breath, hemoptysis, cough or sputum.   GASTROINTESTINAL: No anorexia, nausea, vomiting or diarrhea. No abdominal pain, bright red blood per rectum or melena.  GENITOURINARY: No burning on urination, hematuria or increased frequency.  NEUROLOGICAL: No headache, dizziness, syncope, paralysis, ataxia, numbness or tingling in the extremities. No change in bowel or bladder control.   MUSCULOSKELETAL: No muscle, back pain, joint pain or stiffness.   HEMATOLOGIC: No anemia, bleeding or bruising.   LYMPHATICS: No enlarged nodes. No history of splenectomy.   PSYCHIATRIC: No history of depression, anxiety, hallucinations.   ENDOCRINOLOGIC: No reports of sweating, cold or heat intolerance. No polyuria or polydipsia.        Objective:   Temp:  [97.4 °F (36.3 °C)-97.6 °F (36.4 °C)] 97.6 °F (36.4 °C)  Heart Rate:  [54-71] 58  Resp:  [15-28] 15  BP: ()/(56-81) 129/81  There is no height or weight on file to calculate BMI.    Vitals:    09/17/21 2252   BP: 129/81   Pulse: 58   Resp: 15   Temp: 97.6 °F (36.4 °C)   SpO2: 92%       Head:    Normocephalic, without obvious abnormality, atraumatic   Eyes:            Lids and lashes normal, conjunctivae and sclerae normal, no   icterus, no pallor   Ears:    Ears appear intact with no abnormalities noted   Throat:   No oral lesions, dentition good   Neck:   No adenopathy, supple, trachea midline, no thyromegaly, no   carotid bruit, no JVD   Lungs:     Breath sounds are  equal and clear to auscultation    Heart:    Normal S1 and loud S2, RRR, No M/G/R   Abdomen:    Normal bowel sounds, no masses, no organomegaly, soft, nontender,       nondistended, no guarding   Extremities:   Moves all extremities well, no edema, no cyanosis, no redness   Pulses:   Pulses palpable and equal bilaterally.    Skin:  Psychiatric:   No bleeding, bruising or rash    Awake, alert and oriented x 3, normal mood and affect           Admission EKG 09/17/2021:      I personally viewed and interpreted the patient's EKG/Telemetry data    Assessment:  There are no hospital problems to display for this patient.      Plan: This is a gentleman with a high risk submassive pulmonary embolus his syncope is very concerning that he probably had transient low or no flow and his CT he is got large amount of central pulmonary embolus in both pulmonary arteries this may have been going on for as long as a month and yesterday may have been another event or or may have been the primary event it is difficult to tell either way I think that he would benefit from an aggressive invasive approach and I think that an inari embolectomy is the initial approach we would take with this I have discussed the risk versus benefits of this with him and his wife at length we will have perfusion on standby if we were to have complications.  I am going to have hematology see him also for this unprovoked PE although he did have a period of stasis    Enrike Cantu MD  09/18/21  08:30 EDT.      Electronically signed by Enrike Cantu MD at 09/18/21 0832       [facility] CATH LAB  [address] [facility] [address] [phone]             Christopher Gupta  Cardiac Catheterization/Vascular Study  Order# [id number]  Reading physician: Enrike Cantu MD Ordering physician: Enrike Cantu MD Study date: 9/18/21    Procedures    Pumonary angiography -Bilateral   Right Heart Cath   Percutaneous Manual Thrombectomy- INARI       Patient Information    Patient Name   Christopher Gupta MRN   [id number] Legal Sex   Male  (Age)   1960 (60 y.o.)   Race Ethnicity Encounter Category   White or  Not  or  Urgent    Printable Vessel Diagram    Printable Vessel Diagram      Physicians    Panel Physicians Referring Physician Case Authorizing Physician   Enrike Cantu MD (Primary) Chester Olson MD Dillon, William, MD       Conclusion    CARDIAC CATHETERIZATION REPORT     Procedure: Right heart catheterization, pulmonary angiogram bilaterally selective, bilateral pulmonary thrombectomy right and left pulmonary artery     DATE OF PROCEDURE: 21        PROCEDURE PERFORMED BY: Enrike Cantu MD, [facility]     INDICATION FOR PROCEDURE: Submassive pulmonary emboli with continued hypoxemia and evidence of right heart dilatation and strain     DESCRIPTION OF PROCEDURE: After consent was obtained, using a micropuncture technique and ultrasound guidance placed an 7 Fr sheath was placed in the right common femoral vein.  I then deployed a preclose suture in the right common femoral vein.  I then positioned a core wire into the superior vena cava from the right common femoral vein.  Then with serial dilatation with a 12 and 16 Uruguayan dilator then placed a 24 Uruguayan dry seal sheath all the way up into the inferior vena cava under fluoroscopic guidance.  We gave a total of 10,000 units of heparin to achieve an ACT of 230 seconds.  I then advanced a 7 Uruguayan balloon flotation catheter (BFC) and performed right heart catheterization with measurement of the pressures in the right atrium, right ventricle and pulmonary artery.  The flotation catheter went into the right pulmonary artery and I positioned a Glidewire advantage into the inferior branch of the right pulmonary artery and exchanged out the BFC and brought in a Grollman injection catheter to the right pulmonary artery.  Pulmonary angiography was then performed.   Following that we brought an Amplatz extra-support wire up and positioned that into the inferior branch of the right pulmonary artery.   I advanced the Aspiration Guide Catheter (AGC) up into the right pulmonary artery.  We then did aspiration thrombectomy x4 with a tremendous visual removal of thrombus.   We did aspiration until there was no further removal of embolus.  We did an angiogram through the AGC catheter and it revealed essentially all of the embolus had been removed.  I then exchanged out the AGC and brought the BFC up and floated it into the left pulmonary artery.  A at that point we then brought the Glidewire advantage up into the left lung and I then placed the Grollman catheter in the left lung and pulmonary angiography was performed.  We then used a Glidewire advantage to get into the inferior segment of the lung and we exchanged out and position the AGC into the left lung over an Amplatz extra-support wire. Aspiration thrombectomy of the left lung was performed using the AGC x2.  We continue to aspirate until no further embolus was returned.  I then did an angiogram through the AGC catheter and it also removed that the vast majority of the embolus have been removed.  The BFC was repositioned into the pulmonary artery and repeat pulmonary pressures and pulmonary arterial saturation were obtained.  At that point we felt we had accomplished everything we could with this and we removed the catheter.  We then removed the 24 Swedish dry seal sheath and deployed the preclosed suture with great results and hemostasis was obtained.  The patient tolerated the procedure well without early complication.      FINDINGS:  Right heart cath                              Pre                                    Post                              Pressure  RA                       6                                        6  RV                       52/14                                35/9  PA                       52/15  mean 25                 35/8 mean 18     PA sat                  70                                      75     Pulmonary angiography:  Right pulmonary artery: Large amount of central embolus involving all segments of the right pulmonary artery, delayed perfusion through the entire right lung except for the very superior portion     Post angiogram no embolus and normal perfusion throughout the right lung     Left pulmonary artery: Large central embolus mainly in the mid and inferior segment of the left pulmonary artery with reduced perfusion     Post angiogram no embolus and normal perfusion throughout the left lung     Summary: Highly successful pulmonary embolectomy bilaterally.  Will hold the heparin for 4 hours and then start Eliquis tonight when ask the hematologist to see him for a hypercoagulable evaluation.  Hopefully he may even be able to go home tomorrow.     Time Under Physician Observation    Name Panel   Enrike Cantu MD Panel 1   Role: Primary   Time Period: 9/18/2021 1033 - 9/18/2021 1146    Total Sedation Time    Moderate sedation event time was not documented.      Vessel Access    A 7 fr sheath was successfully inserted using micropuncture technique with ultrasound guidance into the right femoral vein.    Sheath Disposition    Hemostasis started on the right femoral vein. Perclose was used in achieving hemostasis. Closure device deployed in the vessel. Hemostasis achieved successfully.     Radiation     Event   11:46 AM Radiation Tracking   Details: Panel 1: Enrike Cantu MD   Cumulative Air Kerma (mGy) = 137.000; Fluoro Time (min) = 14.4; DAP (mGy-cm2) = 4050.000   User: NG      Total Contrast    Medication Name Total Dose   iopamidol (ISOVUE-370) 76 % injection 73 mL      Patient Hx Of Height, Weight, and Vitals    Height Weight BSA (Calculated - sq m) BMI (kg/m2) Pulse BP       53 112/74   Medications  (Filter: Administrations occurring from 0000 to 1233 on 09/18/21)  (important)   Continuous medications are totaled by the amount administered until 09/18/21 1233.   sodium chloride 0.9 % infusion (mL/hr)  Total dose:  Cannot be calculated* Dosing weight:  97.5  *Continuous medication not stopped within the calculation time range.  Date/Time  Rate/Dose/Volume Action   09/18/21 1011  125 mL/hr New Bag   1144  400  [vol]       fentaNYL citrate (PF) (SUBLIMAZE) injection (mcg)  Total dose:  200 mcg  Date/Time  Rate/Dose/Volume Action   09/18/21 1012  50 mcg Given   1028  50 mcg Given   1040  50 mcg Given   1116  50 mcg Given      midazolam (VERSED) injection (mg)  Total dose:  4 mg  Date/Time  Rate/Dose/Volume Action   09/18/21 1012  1 mg Given   1028  1 mg Given   1054  1 mg Given   1115  1 mg Given      lidocaine (XYLOCAINE) 2% injection (mL)  Total volume:  10 mL  Date/Time  Rate/Dose/Volume Action   09/18/21 1033  10 mL Given      heparin (porcine) injection (Units)  Total dose:  12,000 Units  Date/Time  Rate/Dose/Volume Action   09/18/21 1040  7,000 Units Given   1053  5,000 Units Given      iopamidol (ISOVUE-370) 76 % injection (mL)  Total volume:  73 mL  Date/Time  Rate/Dose/Volume Action   09/18/21 1144  73 mL Given      heparin (porcine) 5000 UNIT/ML injection 3,900-7,800 Units (Units/kg)  Total dose:  Cannot be calculated* Dosing weight:  97.5  *Administration dose not documented  Date/Time  Rate/Dose/Volume Action   09/18/21 1003  *Not included in total MAR Hold      heparin 63935 units/250 mL (100 units/mL) in 0.45 % NaCl infusion (Units/kg/hr)  Total dose:  13,788.78 Units Dosing weight:  97.5  Date/Time  Rate/Dose/Volume Action   09/18/21 0322  15.4 Units/kg/hr - 15.01 mL/hr New Bag      ibuprofen (ADVIL,MOTRIN) tablet 400 mg (mg)  Total dose:  Cannot be calculated* Dosing weight:  97.5  *Administration dose not documented  Date/Time  Rate/Dose/Volume Action   09/18/21 1003  *Not included in total MAR Hold      nitroglycerin (NITROSTAT) SL tablet 0.4 mg (mg)  Total dose:  Cannot be  calculated* Dosing weight:  97.5  *Administration dose not documented  Date/Time  Rate/Dose/Volume Action   09/18/21 1003  *Not included in total MAR Hold      sodium chloride 0.9 % infusion (mL/hr)  Total volume:  691.67 mL Dosing weight:  97.5  Date/Time  Rate/Dose/Volume Action   09/18/21 0701  125 mL/hr - 1,000  [vol] New Bag     Canceled Entry      zolpidem (AMBIEN) tablet 5 mg (mg)  Total dose:  Cannot be calculated* Dosing weight:  97.5  *Administration dose not documented  Date/Time  Rate/Dose/Volume Action   09/18/21 1003  *Not included in total MAR Hold      Supplies    Name ID Temporary Type Charge Code Description Charge Code Quantity   KT MANIFLD CARDIAC 302 No Supply HC OR SUPPLY SHELL 272/NO CPT 839925 1   CATH PULM WEDGE PRESS 7F 5531 No Diagnostic Catheter HC OR SUPPLY SHELL 272/NO CPT 563914 1   INTRO SHEATH DRYSEAL FLEX 24F 8.0TO8.8MM 33CM 8299 No Supply HC OR SUPPLY SHELL 272/ 751362 1   CATH DIAG EXPO FR4 5F 125CM SGL 52834 No Diagnostic Catheter HC OR SUPPLY SHELL 272/NO CPT 532664 1   CATH PULM GPC PE 4SD/PRT 6.7F 100CM 95771 No Diagnostic Catheter HC OR SUPPLY SHELL 272/NO CPT 069698 1   GW GLIDEWIRE ADVANTAGE ANG .035IN [id number] 37356 No Guidewire HC OR SUPPLY SHELL 272/ 436544 1   GW HITORQ/SUPRACORE .035IN 300CM 17642 No Guidewire HC OR SUPPLY SHELL 272/ 293019 1   GW HITORQ/SUPRACORE .035IN 190CM 67627 No Guidewire HC OR SUPPLY SHELL 272/ 339584 1   DEV CLS VESL PERCLOSE PROGLIDE 20075 No Hemostasis Device HC OR SUPPLY SHELL 278/ 242032 1   GW EMR FIX EXCHG J STD .035 3MM 260CM 45070 No Guidewire HC OR SUPPLY SHELL 272/ 768486 1   KT INTRO BETSY MANDREL NITNL GW/SS 7 REG4F 65409 No Sheath HC OR SUPPLY SHELL 272/ 155775 1   INTRO SHEATH ART/FEM ENGAGE .035 6F12CM 18696 No Sheath HC OR SUPPLY SHELL 272/ 249326 1   INTRO SHEATH ART/FEM ENGAGE .035 7F12CM 83270 No Sheath HC OR SUPPLY SHELL 272/ 891744 1   GW XCHG AMPLTZ XSTIF PTFE CRV .035 3X260  57274 No Guidewire HC OR SUPPLY SHELL 398/ 409310 1   PK CATH CARD 40 95662 No Supply   1   CATH ASP FLOWTRIEVER 24F 95CM 126605 No Diagnostic Catheter HC OR SUPPLY SHELL 278/ 718462 1   SYS FLTR BLD PULM FLOWSAVER THROMB FLTR/40UM RESVR/60CC 395940 No Supply HC OR SUPPLY SHELL 272/NO CPT 089783 1   Signed    Electronically signed by Enrike Cantu MD on 9/18/21 at 1233 EDT    Link to Procedure Log    Procedure Log      Results Routing Tracking    View Results Routing Information   Order Report    Order Details    Vital Signs     Date/Time   Temp   Temp src   Pulse   Resp   BP   Patient Position   SpO2    09/19/21 1134   98.4 (36.9)   Oral   57   16   127/80   Lying   95    09/19/21 0706   98.7 (37.1)   Oral   55   16   114/75   Lying   92    09/18/21 2305   99.1 (37.3)   Oral   55   16   105/61   Lying   93    09/18/21 1900   97.9 (36.6)   Oral   60   16   121/65   Sitting   95    09/18/21 1800   --   --   59   --   119/73   --   95    09/18/21 1700   --   --   60   --   131/83   --   97    09/18/21 1630   --   --   60   --   122/76   --   94    09/18/21 1600   --   --   61   --   127/83   --   94    09/18/21 1551   98.7 (37.1)   Oral   60   16   130/82   Lying   94    09/18/21 1530   --   --   61   --   136/85   --   96    09/18/21 1500   --   --   55   --   130/79   --   96    09/18/21 1450   --   --   55   --   130/83   --   96    09/18/21 1430   --   --   59   --   134/84   --   97    09/18/21 1400   --   --   59   --   131/82   --   98    09/18/21 1331   --   --   52   --   120/79   --   97    09/18/21 1316   --   --   54   --   122/76   --   95    09/18/21 1300   --   --   54   --   114/76   --   94    09/18/21 1245   --   --   54   --   113/70   --   94    09/18/21 1230   --   --   53   --   112/74   --   96    09/18/21 1145   --   --   --   15   --   --   --    09/18/21 11:42:09   --   --   --   --   --   --   98    09/18/21 11:40:16   --   --   --   15   --   --   --    09/18/21 11:35:06   --    --   --   16   --   --   --    09/18/21 11:30:15   --   --   --   16   --   --   --    09/18/21 11:25:33   --   --   --   16   --   --   --    09/18/21 11:20:23   --   --   --   16   --   --   --    09/18/21 11:15:31   --   --   --   14   --   --   --    09/18/21 11:10:30   --   --   --   13   --   --   --    09/18/21 11:05:01   --   --   --   13   --   --   --    09/18/21 11:00:09   --   --   --   13   --   --   --    09/18/21 10:55:23   --   --   --   16   --   --   --    09/18/21 10:50:42   --   --   --   15   --   --   --    09/18/21 10:45:20   --   --   --   15   --   --   --    09/18/21 10:40:01   --   --   --   24   --   --   --    09/18/21 10:34:49   --   --   --   --   120/69   --   --    09/18/21 10:27:05   --   --   --   16   --   --   --    09/18/21 10:21:43   --   --   --   16   --   --   --    09/18/21 10:16:49   --   --   --   16   --   --   --    09/18/21 10:11:14   --   --   --   16   --   --   --    09/18/21 0707   --   --   63   16   121/70   Lying   95    09/17/21 2252   97.6 (36.4)   Oral   58   15   129/81   Lying   92              Oxygen Therapy (last 7 days) before discharge     Date/Time   SpO2   Device (Oxygen Therapy)   Flow (L/min)   Oxygen Concentration (%)   ETCO2 (mmHg)    09/19/21 1134   95   room air   --   --   --    09/19/21 0706   92   room air   --   --   --    09/18/21 9358   93   --   --   --   --    09/18/21 2000   --   room air   --   --   --    09/18/21 1900   95   --   --   --   --    09/18/21 1800   95   --   --   --   --    09/18/21 1700   97   --   --   --   --    09/18/21 1630   94   --   --   --   --    09/18/21 1624   --   room air   --   --   --    09/18/21 1600   94   --   --   --   --    09/18/21 1551   94   nasal cannula   1   --   --    09/18/21 1530   96   --   --   --   --    09/18/21 1500   96   --   --   --   --    09/18/21 1450   96   --   --   --   --    09/18/21 1430   97   --   --   --   --    09/18/21 1400   98   --   --   --   --    09/18/21 1331   97    nasal cannula   1   --   --    21 1316   95   --   --   --   --    21 1311   --   nasal cannula   2   --   --    21 1300   94   --   --   --   --    21 1245   94   --   --   --   --    21 1230   96   --   --   --   --    21 11:42:09   98   nasal cannula with ETCO2   4   --   --    21 0707   95   nasal cannula   2   --   --    21 2252   92   nasal cannula   2   --   --              Facility-Administered Medications as of 2021   Medication Dose Route Frequency Provider Last Rate Last Admin   • [COMPLETED] heparin bolus from bag 7,800 Units  80 Units/kg Intravenous Once Chris Vaughan MD   7,800 Units at 21 1507   • [COMPLETED] iopamidol (ISOVUE-370) 76 % injection 100 mL  100 mL Intravenous Once in imaging Chris Vaughan MD   100 mL at 21 1416   • [COMPLETED] sodium chloride 0.9 % infusion    Continuous PRN Enrike Cantu  mL/hr at 21 1011 125 mL/hr at 21 1011   • [] sodium chloride 0.9 % infusion  125 mL/hr Intravenous Continuous Enrike Cantu  mL/hr at 21 1500 125 mL/hr at 21 1500     Orders (last 7 days)      Start     Ordered    21 1209  Discharge patient  Once      21 1209    21 1209  Discontinue IV  Once,   Status:  Canceled      21 1209    21 0800  Duplex Venous Lower Extremity - Bilateral CAR  Once      21 0053    21 0600  CBC (No Diff)  Morning Draw,   Status:  Canceled      21 1220    21 0600  Basic Metabolic Panel  Morning Draw,   Status:  Canceled      21 1220    21 0600  CBC & Differential  Morning Draw,   Status:  Canceled      21 1502    21 0600  Comprehensive Metabolic Panel  Morning Draw      21 1502    21 0600  CBC Auto Differential  PROCEDURE ONCE,   Status:  Canceled     Comments: Discontinue After Heparin Stopped      219    21 0430  aPTT  Daily,   Status:  Canceled      Comments: Cancel If Patient Has Infusion Changes      09/18/21 0114    09/19/21 0430  CBC & Differential  Daily,   Status:  Canceled     Comments: Discontinue After Heparin Stopped      09/18/21 0114    09/19/21 0430  CBC Auto Differential  PROCEDURE ONCE     Comments: Discontinue After Heparin Stopped      09/18/21 2030    09/19/21 0408  Manual Differential  Once,   Status:  Canceled     Comments: Discontinue After Heparin Stopped      09/19/21 0407    09/19/21 0000  Apixaban Starter Pack tablet therapy pack  Take As Directed      09/19/21 1209    09/19/21 0000  Discharge Follow-up with Specified Provider: LINA Harvey or LINA Mast; 1 Week      09/19/21 1209    09/19/21 0000  Discharge Follow-up with Specified Provider: Dr Cantu; 1 Month      09/19/21 1209    09/18/21 1600  Strict intake and output  Every 4 Hours,   Status:  Canceled      09/18/21 1220    09/18/21 1600  apixaban (ELIQUIS) tablet 10 mg  Every 12 Hours Scheduled,   Status:  Discontinued      09/18/21 1220    09/18/21 1503  Factor 5 Leiden  Once      09/18/21 1502    09/18/21 1503  Factor II, DNA Analysis  Once      09/18/21 1502    09/18/21 1503  Lupus Anticoagulant  Once      09/18/21 1502    09/18/21 1503  Cardiolipin Antibody  Once      09/18/21 1502    09/18/21 1503  Beta-2 Glycoprotein Antibodies  Once      09/18/21 1502    09/18/21 1503  Antithrombin III  Once      09/18/21 1502    09/18/21 1503  Protein C Activity  Once      09/18/21 1502    09/18/21 1503  Protein S Functional  Once      09/18/21 1502    09/18/21 1503  Protein S Antigen, Free  Once      09/18/21 1502    09/18/21 1503  Immunoglobulin Free LT Chains Blood  Once      09/18/21 1502    09/18/21 1503  ROCIO + PE  Once      09/18/21 1502    09/18/21 1503  Fibrinogen  Once      09/18/21 1502    09/18/21 1503  Lipid Panel  Once      09/18/21 1502    09/18/21 1503  C-reactive Protein  Once      09/18/21 1502    09/18/21 1311  Hematology & Oncology Inpatient Consult  Once      Specialty:  Hematology and Oncology  Provider:  Juliann Valdez MD    09/18/21 1310    09/18/21 1258  Invasive peripheral vascular study  Once      09/18/21 1257    09/18/21 1256  EP/CRM Study  Once,   Status:  Canceled      09/18/21 1255    09/18/21 1222  escitalopram (LEXAPRO) tablet 20 mg  Daily,   Status:  Discontinued      09/18/21 1220    09/18/21 1222  sodium chloride 0.9 % infusion  Continuous      09/18/21 1220    09/18/21 1221  Hematology & Oncology Inpatient Consult  Once,   Status:  Canceled     Specialty:  Hematology and Oncology  Provider:  Juliann Valdez MD    09/18/21 1220    09/18/21 1220  Diet Regular  Diet Effective Now,   Status:  Canceled      09/18/21 1220    09/18/21 1219  Inpatient Admission  Once      09/18/21 1220    09/18/21 1219  Remove Venous Sheath  Once      09/18/21 1220    09/18/21 1218  Head of Bed: Flat; 2 Hours; Elevate Head of Bed: 30 Degrees; 2 Hours; Activity Level: Strict Bed Rest; Keep Affected Extremity/ Extremities Straight; Total Bedrest Time? (4 hrs)  As Needed,   Status:  Canceled      09/18/21 1220    09/18/21 1217  Obtain STAT EKG during chest pain. Notify MD of any change in rhythm, ST segments or complaints of chest pain.  Until Discontinued,   Status:  Canceled      09/18/21 1220    09/18/21 1217  Encourage fluids  Until Discontinued,   Status:  Canceled      09/18/21 1220    09/18/21 1217  Oxygen Therapy- Nasal Cannula; Titrate for SPO2: equal to or greater than, 92%, per policy  Continuous,   Status:  Canceled      09/18/21 1220    09/18/21 1217  Continuous Pulse Oximetry  Continuous,   Status:  Canceled      09/18/21 1220    09/18/21 1217  Advance diet as tolerated  Until Discontinued,   Status:  Canceled      09/18/21 1220    09/18/21 1217  Notify MD of hypotension (SBP less than 95), bleeding, or dysrythmia and follow Sheath Removal Policy if needed.  Until Discontinued,   Status:  Canceled      09/18/21 1220    09/18/21 1217  Hold metFORMIN (GLUCOPHAGE)  for 48 Hours  Continuous,   Status:  Canceled      09/18/21 1220    09/18/21 1216  Vital Signs  As Needed,   Status:  Canceled      09/18/21 1220    09/18/21 1216  Change site dressing  As Needed,   Status:  Canceled      09/18/21 1220    09/18/21 1216  HYDROcodone-acetaminophen (NORCO) 5-325 MG per tablet 1 tablet  Every 4 Hours PRN,   Status:  Discontinued      09/18/21 1220    09/18/21 1216  morphine injection 1 mg  Every 4 Hours PRN,   Status:  Discontinued      09/18/21 1220    09/18/21 1216  naloxone (NARCAN) injection 0.4 mg  Every 5 Minutes PRN,   Status:  Discontinued      09/18/21 1220    09/18/21 1216  ondansetron (ZOFRAN) tablet 4 mg  Every 6 Hours PRN,   Status:  Discontinued      09/18/21 1220    09/18/21 1216  ondansetron (ZOFRAN) injection 4 mg  Every 6 Hours PRN,   Status:  Discontinued      09/18/21 1220    09/18/21 1147  POC Activated Clotting Time  Once      09/18/21 1046    09/18/21 1147  POC Panel 9, ISTAT  Once      09/18/21 1143    09/18/21 1146  POC Activated Clotting Time  Once      09/18/21 1037    09/18/21 1144  iopamidol (ISOVUE-370) 76 % injection  As Needed,   Status:  Discontinued      09/18/21 1144    09/18/21 1040  heparin (porcine) injection  As Needed,   Status:  Discontinued      09/18/21 1040    09/18/21 1039  Cardiac Catheterization/Vascular Study  Once      09/18/21 1038    09/18/21 1033  lidocaine (XYLOCAINE) 2% injection  As Needed,   Status:  Discontinued      09/18/21 1034    09/18/21 1012  midazolam (VERSED) injection  As Needed,   Status:  Discontinued      09/18/21 1012    09/18/21 1012  fentaNYL citrate (PF) (SUBLIMAZE) injection  As Needed,   Status:  Discontinued      09/18/21 1012    09/18/21 1011  sodium chloride 0.9 % infusion  Continuous PRN      09/18/21 1012    09/18/21 0921  Invasive peripheral vascular study  Once,   Status:  Canceled      09/18/21 0920 09/18/21 0829  Obtain Informed Consent  Once      09/18/21 0829 09/18/21 0827  Case Request Cath Lab:  [facility]  Once      09/18/21 0829    09/18/21 0700  sodium chloride 0.9 % infusion  Continuous,   Status:  Discontinued      09/18/21 0612    09/18/21 0650  COVID PRE-OP / PRE-PROCEDURE SCREENING ORDER (NO ISOLATION) - Swab, Nasopharynx  Once,   Status:  Canceled      09/18/21 0650    09/18/21 0650  COVID-19,[facility] IN-HOUSE CEPHEID/[facility]A NP SWAB IN TRANSPORT MEDIA 8-12 HR TAT - Swab, Nasopharynx  PROCEDURE ONCE,   Status:  Canceled      09/18/21 0650    09/18/21 0630  Discontinue Patient Isolation  Once,   Status:  Canceled      09/18/21 0629    09/18/21 0311  NPO Diet  Diet Effective Now,   Status:  Canceled      09/18/21 0311    09/18/21 0200  heparin 72211 units/250 mL (100 units/mL) in 0.45 % NaCl infusion  Titrated,   Status:  Discontinued      09/18/21 0114    09/18/21 0112  heparin (porcine) 5000 UNIT/ML injection 3,900-7,800 Units  Every 6 Hours PRN,   Status:  Discontinued      09/18/21 0114    09/18/21 0112  Initiate Heparin Protocol  Until Discontinued,   Status:  Canceled      09/18/21 0114    09/18/21 0112  Adjust Heparin Rate Based on aPTT Using Nomogram  Continuous,   Status:  Canceled      09/18/21 0114    09/18/21 0112  Verify All Anticoagulant Orders Are Discontinued Upon Initiation of Heparin Protocol (eg Enoxaparin, Fondaparinux, Apixaban, Dabigatran, Edoxaban, or Rivaroxaban)  Once,   Status:  Canceled      09/18/21 0114    09/18/21 0112  Protime-INR  Once     Comments: Prior to Initial Heparin Bolus      09/18/21 0114    09/18/21 0112  aPTT  Once     Comments: Prior to Initial Heparin Bolus      09/18/21 0114    09/18/21 0112  CBC & Differential  Once     Comments: Prior to Initial Heparin Bolus      09/18/21 0114    09/18/21 0112  CBC Auto Differential  PROCEDURE ONCE     Comments: Prior to Initial Heparin Bolus      09/18/21 0114    09/18/21 0111  aPTT  As Needed,   Status:  Canceled      09/18/21 0114    09/18/21 0111  RN To Release aPTT Order 6 Hours After Heparin Bolus & 6 Hours After Any  Heparin Rate Change  As Needed,   Status:  Canceled      09/18/21 0114    09/18/21 0053  CBC & Differential  Once,   Status:  Canceled      09/18/21 0053    09/18/21 0053  Basic Metabolic Panel  Once,   Status:  Canceled      09/18/21 0053    09/18/21 0053  aPTT  Once,   Status:  Canceled      09/18/21 0053    09/18/21 0053  CBC Auto Differential  PROCEDURE ONCE,   Status:  Canceled      09/18/21 0053 09/18/21 0045  zolpidem (AMBIEN) tablet 5 mg  Nightly PRN,   Status:  Discontinued      09/18/21 0053 09/18/21 0045  Telemetry - Maintain IV Access  Continuous,   Status:  Canceled      09/18/21 0053 09/18/21 0045  Continuous Cardiac Monitoring  Continuous,   Status:  Canceled      09/18/21 0053 09/18/21 0045  May Be Off Telemetry for Tests  Continuous,   Status:  Canceled      09/18/21 0053 09/18/21 0045  ACLS Protocol For Life Threatening Dysrhythmias (Unless Code Status Indicates Otherwise)  Continuous,   Status:  Canceled      09/18/21 0053 09/18/21 0045  Notify Provider if ACLS Protocol Activated  Until Discontinued,   Status:  Canceled      09/18/21 0053 09/18/21 0044  ibuprofen (ADVIL,MOTRIN) tablet 400 mg  Every 4 Hours PRN,   Status:  Discontinued      09/18/21 0053 09/18/21 0044  nitroglycerin (NITROSTAT) SL tablet 0.4 mg  Every 5 Minutes PRN,   Status:  Discontinued      09/18/21 0053 09/18/21 0044  ECG 12 Lead  As Needed,   Status:  Canceled     Comments: Nurse to Release if Patient Expericences Acute Chest Pain or Dysrhythmias    09/18/21 0053    09/18/21 0044  Potassium  As Needed,   Status:  Canceled     Comments: For Ventricular Arrhythmias      09/18/21 0053 09/18/21 0044  Magnesium  As Needed,   Status:  Canceled     Comments: For Ventricular Arrhythmias      09/18/21 0053 09/18/21 0044  Troponin  As Needed,   Status:  Canceled     Comments: For Chest Pain      09/18/21 0053 09/18/21 0044  Digoxin Level  As Needed,   Status:  Canceled     Comments: For Atrial  Arrhythmias      09/18/21 0053    09/18/21 0044  Blood Gas, Arterial -  As Needed,   Status:  Canceled     Comments: Per O2 PolicyNotify Physician      09/18/21 0053 09/18/21 0044  Telemetry - Pulse Oximetry  Continuous PRN,   Status:  Canceled     Comments: If Patient Develops Unresponsiveness, Acute Dyspnea, Cyanosis or Suspected Hypoxemia Start Continuous Pulse Ox Monitoring, Apply Oxygen & Notify Provider    09/18/21 0053 09/18/21 0044  Oxygen Therapy- Nasal Cannula; Titrate for SPO2: 90% - 95%  Continuous PRN,   Status:  Canceled     Comments: If Patient Develops Unresponsiveness, Acute Dyspnea, Cyanosis or Suspected Hypoxemia Start Continuous Pulse Ox Monitoring, Apply Oxygen & Notify Provider    09/18/21 0053                               Physician Progress Notes       Adonay Washington MD at 09/19/21 1319            Subjective  MASSIVE PULMONARY EMBOLISM WITH SEVERE PULMONARY HYPERTENSION, LIKELY CLOTH IN RLE WITH FREQUENT SHOWERS OF EMBOLI SINCE July 24/21 AFTER 20 HS DRIVE TO [place]        History of Present Illness delightful 60-year-old white male who was mowing his grass yesterday and after completion of his task, he was feeling significant amount of shortness of breath and chest pressure and he had a syncopal episode. This was witnessed by his wife, 911 was called and he was brought to [facility] Emergency Room where a diagnosis of a massive pulmonary embolism was made. The patient was placed on heparin and shipped to [facility]. Dr. Cantu took care of him after he has undergone an embolectomy. The patient is now receiving Eliquis. His symptoms pertinent to yesterday are dramatically better. He is no longer having any shortness of breath, chest pain, pressure, cough, hemoptysis or pleuritic pain. He has no palpitations.      It happens to be that the patient drove all the way on his own to [place] 20 hours, stopping only for gas and food on 1 occasion. During the  trip he developed a significant so-called charley horse in the right lower extremity calf that remained persistent and then disappeared for a few days while in [place]. He gained some weight and he thought that altitude was giving him shortness of breath. Upon returning to [place] a few days later the patient’s shortness of breath persisted and when he was doing his workouts he was feeling significantly decreased in his ability to function including treadmills and weightlifting. Some days last week the symptoms became so bad that he had to stop doing his physical activity because he was very short winded. He developed pain again in the right calf close to the Achilles and that persisted for at least 2 days, taking some Aleve with mild improvement. He has not had any swelling that he could tell before but now he has had some swelling in the last few days. The left calf has been normal. The patient has not had any fever or chills. He also thought that he had some respiratory allergies and he blamed this to the picture. In fact he has not had too much rhinorrhea, sneezing, itching or anything of this nature. He also has had some loose bowel activity for a few days. He has not had any passage of blood in the stool. Urination has been normal. He gained weight, almost 20 pounds while being in [place]. He lost almost 6 to 10 back into [place] modifying his diet and physical activity. He has not had any rash in the skin. No joint pain. No neurological symptomatology.     This afternoon he feels perfectly back to normal baseline but he is resting in the bed after his procedure.        The patient has no history of hypertension, diabetes, hyperlipidemia. He has no history of asthma. He has history of upper respiratory allergies. He has had colonoscopy in the past, being negative normal and PSA has been checked being negative normal. He never has had skin cancer. He has not had any other medical illness. He is  perfectly healthy and typically he does not take any medicines. He takes occasional vitamins. He never has been exposed to anabolic steroids.    Dictation on: 09/19/2021 10:25 PM by: HAYLEY DICKERSON [[id number]]         Past Medical History, Past Surgical History, Social History, Family History have been reviewed and are without significant changes except as mentioned.    Review of Systems   Musculoskeletal:        TENDER R CALF   All other systems reviewed and are negative.     A comprehensive 14 point review of systems was performed and was negative except as mentioned.    Medications:  The current medication list was reviewed in the EMR    ALLERGIES:    Allergies   Allergen Reactions   • Penicillins Rash       Objective      Vitals:    09/18/21 2305 09/19/21 0655 09/19/21 0706 09/19/21 1134   BP: 105/61  114/75 127/80   BP Location: Left arm  Left arm Left arm   Patient Position: Lying  Lying Lying   Pulse: 55  55 57   Resp: 16  16 16   Temp: 99.1 °F (37.3 °C)  98.7 °F (37.1 °C) 98.4 °F (36.9 °C)   TempSrc: Oral  Oral Oral   SpO2: 93%  92% 95%   Weight:  97.8 kg (215 lb 8 oz)       No flowsheet data found.    Physical Exam   Dictation on: 09/19/2021 10:26 PM by: HAYLEY DICKERSON [[id number]]         RECENT LABS:  Hematology WBC   Date Value Ref Range Status   09/19/2021 6.58 3.40 - 10.80 10*3/mm3 Final     RBC   Date Value Ref Range Status   09/19/2021 4.11 (L) 4.14 - 5.80 10*6/mm3 Final     Hemoglobin   Date Value Ref Range Status   09/19/2021 12.6 (L) 13.0 - 17.7 g/dL Final     Hematocrit   Date Value Ref Range Status   09/19/2021 37.3 (L) 37.5 - 51.0 % Final     Platelets   Date Value Ref Range Status   09/19/2021 144 140 - 450 10*3/mm3 Final       Interpretation Summary DOPPLER     · Acute right lower extremity deep vein thrombosis noted in the distal femoral, popliteal, posterior tibial, peroneal and soleal.  · All other veins appeared normal bilaterally         Assessment/Plan  He drove to [place] on his own for 20  hours, not stopping. He developed pain in the calf of the right lower extremity that he called charley horse. He has shortness of breath presumed to be related to altitude sickness in [place] and upon returning to the [place] the shortness of breath remained ongoing and became worse. Since then he has had times that he has been able to do his workouts with no major difficulties and later on his workouts have been minimized given the shortness of breath produced by minimal physical activity. A few days ago he had a new pain in the right calf close to the Achilles tendon. While mowing the grass yesterday he had syncope and very likely the explanation of all this is his pulmonary vasculature could not accommodate anymore blood clots and the flow through the left ventricle from the pulmonary veins was minimized producing syncope. The patient is alive by miracle. The procedure done by Dr. Cantu and stated above is more than dramatic in the description and tells us the amount of clots that this patient had. I pointed out to the patient that it is a miracle that he stayed alive. Many people go through this and they do not live to tell a story. Therefore, if somebody needs to have flowers and chocolates, it is Dr. Cantu and the emergency room team at [facility]. I think the quick transition and the care that this patient received is world quality and deserves to be credited.      The patient has no personal history of thrombophilia before. He has no family history of thrombophilia. He has not been taking anabolic steroids. His white count, hemoglobin and platelets are normal. His platelet count is minimally low because of platelet consumption associated with massive amount of clotting. Chemistry profile is normal. The CT angiogram from [facility] has been reviewed. Eventually we will need to ask the radiologist to amend the report because he mentioned something in the renal artery that probably  is a type error. The description by Dr. Cantu has been reviewed.      The patient is already receiving Eliquis and I advised him that he will remain on Eliquis at least for a year and in my opinion probably for the rest of his life. Obviously we need to proceed with thrombophilia assessment but I think in my opinion the most likely factor that triggered all this was the long trip to [place]. He probably has had showers and showers and showers of clots and emboli and his pulmonary circulation could not accommodate anymore clots. He ran out of physical space to put more clots and the circulation into the left side of the heart through the pulmonary veins ran out. I pointed out to him that it is like if the main water entrance of the water to the house has a clog, the rest of the house is not going to have any water. In any event the patient is up and running. He looks terrific. He has no clinical evidence of pulmonary hypertension on clinical examination and it is amazing that he is alive. I think the tolerance to all this is because he is a very fit individual who exercises all the time.      Obviously I am going to need to proceed with thrombophilia assessment that will include factor V Leiden mutation, prothrombin gene mutation, antithrombin III, lupus anticoagulant, anti-glycoprotein antibody profile, anticardiolipin antibody profile as well as protein C, protein S, fibrinogen, lipid profile as well as lupus anticoagulant. I will perform as well a serum protein electrophoresis and serum free light chains.      The patient has had screening colonoscopy in the past, prostate examination being negative normal. No family history of malignancy. No family history of thrombophilia. I need to see this patient in the office in a couple of weeks to go through the report of his laboratory testing. Eventually we will need to repeat a CT angiogram of the chest to be sure that all of his clots in the pulmonary circulation are  clean and I am sure Dr. Cantu eventually will perform another echocardiogram to be sure that there is resolution of his pulmonary hypertension by this methodology.      I pointed out to the patient that he will need to stay away from trauma and I made the description in the common sense utilization at this point.      The patient likes to drink his beers. He does not get drunk but I pointed out that anticoagulants are not good friends of alcohol and his wife will take care of this problem.      He raised the question if sex life will have anything to do with all these issues in regard to inability to perform and I pointed out to him that should not be an issue. I asked him to give himself a break until things get better in the next couple of weeks.      I will review him tomorrow.      I pointed out to the patient the gravity of his situation and how ill he was.    Dictation on: 09/19/2021 10:29 PM by: ADONAY DICKERSON [[id number]]       DURING THE VISIT WITH THE PATIENT TODAY , PATIENT HAD FACE MASK, I HAD PROPER PROTECTIVE EQUIPMENT, AND I DID HAND HYGIENE WITH SOAP AND WATER BEFORE AND AFTER THE VISIT.               9/19/2021      CC:            Electronically signed by Adonay Dickerson MD at 09/19/21 2229          Consult Notes       Adonay Dickerson MD at 09/18/21 1326      Consult Orders    1. Hematology & Oncology Inpatient Consult [[id number]] ordered by Enrike Cantu MD at 09/18/21 1310                 Subjective MASSIVE PULMONARY EMBOLISM WITH SEVERE PULMONARY HYPERTENSION, LIKELY CLOTH IN RLE WITH FREQUENT SHOWERS OF EMBOLI SINCE July 24/21 AFTER 20 HS DRIVE TO [place].          History of Present Illness  I had the opportunity to review this patient today in consultation, a delightful 60-year-old white male who was mowing his grass yesterday and after completion of his task, he was feeling significant amount of shortness of breath and chest pressure and he had a syncopal episode. This was witnessed by his  wife, 911 was called and he was brought to [facility] Emergency Room where a diagnosis of a massive pulmonary embolism was made. The patient was placed on heparin and shipped to [facility]. Dr. Cantu took care of him after he has undergone an embolectomy. The patient is now receiving Eliquis. His symptoms pertinent to yesterday are dramatically better. He is no longer having any shortness of breath, chest pain, pressure, cough, hemoptysis or pleuritic pain. He has no palpitations.     It happens to be that the patient drove all the way on his own to [place] 20 hours, stopping only for gas and food on 1 occasion. During the trip he developed a significant so-called charley horse in the right lower extremity calf that remained persistent and then disappeared for a few days while in [place]. He gained some weight and he thought that altitude was giving him shortness of breath. Upon returning to [place] a few days later the patient’s shortness of breath persisted and when he was doing his workouts he was feeling significantly decreased in his ability to function including treadmills and weightlifting. Some days last week the symptoms became so bad that he had to stop doing his physical activity because he was very short winded. He developed pain again in the right calf close to the Achilles and that persisted for at least 2 days, taking some Aleve with mild improvement. He has not had any swelling that he could tell before but now he has had some swelling in the last few days. The left calf has been normal. The patient has not had any fever or chills. He also thought that he had some respiratory allergies and he blamed this to the picture. In fact he has not had too much rhinorrhea, sneezing, itching or anything of this nature. He also has had some loose bowel activity for a few days. He has not had any passage of blood in the stool. Urination has been normal. He gained weight, almost  20 pounds while being in [place]. He lost almost 6 to 10 back into [place] modifying his diet and physical activity. He has not had any rash in the skin. No joint pain. No neurological symptomatology.    This afternoon he feels perfectly back to normal baseline but he is resting in the bed after his procedure.      The patient has no history of hypertension, diabetes, hyperlipidemia. He has no history of asthma. He has history of upper respiratory allergies. He has had colonoscopy in the past, being negative normal and PSA has been checked being negative normal. He never has had skin cancer. He has not had any other medical illness. He is perfectly healthy and typically he does not take any medicines. He takes occasional vitamins. He never has been exposed to anabolic steroids.      Social History     Socioeconomic History   • Marital status:      Spouse name: Not on file   • Number of children: Not on file   • Years of education: Not on file   • Highest education level: Not on file    APPENDECTOMY  His father is alive and in good health. His mother is alive and in good health. They have no history of thrombophilia. One brother committed suicide a long time ago, another brother has hypertension. Not completely sure if his brother has had blood clots. He has 3 children, boy, girl, boy. One of the boys has autism. The girl stays in [place] in college. Nobody in the family as far as he knows, nephews, siblings, uncles, aunts, and so forth have had any thrombophilia.          Review of Systems   Constitutional: Positive for activity change and fatigue.   HENT: Negative.    Respiratory: Positive for cough and shortness of breath.    Cardiovascular: Positive for chest pain and leg swelling.   Gastrointestinal: Negative.    Endocrine: Negative.    Genitourinary: Negative.    Musculoskeletal: Negative.    Skin: Negative.    Neurological: Negative.    Hematological: Negative.    Psychiatric/Behavioral: Negative.            Medications:    Current Facility-Administered Medications on File Prior to Encounter   Medication Dose Route Frequency Provider Last Rate Last Admin   • [COMPLETED] heparin bolus from bag 7,800 Units  80 Units/kg Intravenous Once Chris Vaughan MD   7,800 Units at 09/17/21 1507   • [COMPLETED] iopamidol (ISOVUE-370) 76 % injection 100 mL  100 mL Intravenous Once in imaging Chris Vaughan MD   100 mL at 09/17/21 1416   • [DISCONTINUED] heparin 08628 units/250 mL (100 units/mL) in 0.45 % NaCl infusion  15.4 Units/kg/hr Intravenous Titrated Chris Vaughan MD   Transferred to External Facility at 09/17/21 2307   • [DISCONTINUED] heparin bolus from bag 3,900 Units  40 Units/kg Intravenous Q6H PRN Chris Vaughan MD       • [DISCONTINUED] heparin bolus from bag 7,800 Units  80 Units/kg Intravenous Q6H PRN Chris Vaughan MD       • [DISCONTINUED] lactated ringers infusion  125 mL/hr Intravenous Continuous Chris Vaughan MD   Stopped at 09/17/21 1745     Current Outpatient Medications on File Prior to Encounter   Medication Sig Dispense Refill   • escitalopram (LEXAPRO) 20 MG tablet Take 20 mg by mouth Daily.     • ibuprofen (ADVIL,MOTRIN) 200 MG tablet Take 200 mg by mouth Every 6 (Six) Hours As Needed for Mild Pain .     • loratadine (Claritin) 10 MG tablet Take 10 mg by mouth Daily.     • Triamcinolone Acetonide (NASACORT) 55 MCG/ACT nasal inhaler 2 sprays into the nostril(s) as directed by provider Daily.       ALLERGIES:    Allergies   Allergen Reactions   • Penicillins Rash       Objective      Vitals:    09/18/21 1145 09/18/21 1230 09/18/21 1245 09/18/21 1300   BP:  112/74 113/70 114/76   BP Location:       Patient Position:       Pulse:  53 54 54   Resp: 15      Temp:       TempSrc:       SpO2:  96% 94% 94%     No flowsheet data found.    Physical Exam  Vitals reviewed.   Constitutional:       General: He is not in acute distress.     Appearance: Normal appearance. He is normal weight. He  is not ill-appearing, toxic-appearing or diaphoretic.   HENT:      Head: Normocephalic.      Nose: Nose normal.      Mouth/Throat:      Mouth: Mucous membranes are moist.      Pharynx: Oropharynx is clear.   Eyes:      General: No scleral icterus.        Right eye: No discharge.         Left eye: No discharge.   Cardiovascular:      Rate and Rhythm: Normal rate and regular rhythm.      Pulses: Normal pulses.      Heart sounds: Normal heart sounds. No murmur heard.   No friction rub. No gallop.    Pulmonary:      Effort: Pulmonary effort is normal. No respiratory distress.      Breath sounds: Normal breath sounds. No stridor. No wheezing, rhonchi or rales.   Chest:      Chest wall: No tenderness.   Abdominal:      General: Abdomen is flat. There is no distension.      Palpations: Abdomen is soft. There is no mass.      Tenderness: There is no abdominal tenderness. There is no guarding or rebound.   Musculoskeletal:         General: Tenderness present. No deformity.      Cervical back: No rigidity or tenderness.      Right lower leg: Edema present.      Left lower leg: No edema.   Lymphadenopathy:      Cervical: No cervical adenopathy.   Skin:     General: Skin is warm and dry.      Capillary Refill: Capillary refill takes 2 to 3 seconds.      [place]: Skin is not jaundiced.      Findings: No bruising or lesion.   Neurological:      General: No focal deficit present.      Mental Status: He is alert and oriented to person, place, and time.   Psychiatric:         Mood and Affect: Mood normal.         Behavior: Behavior normal.         Thought Content: Thought content normal.         Judgment: Judgment normal.               RECENT LABS:  Hematology WBC   Date Value Ref Range Status   09/18/2021 6.95 3.40 - 10.80 10*3/mm3 Final     RBC   Date Value Ref Range Status   09/18/2021 4.51 4.14 - 5.80 10*6/mm3 Final     Hemoglobin   Date Value Ref Range Status   09/18/2021 12.6 12.0 - 17.0 g/dL Final   09/18/2021 13.8 13.0 -  17.7 g/dL Final     Hematocrit   Date Value Ref Range Status   09/18/2021 37 (L) 38 - 51 % Final   09/18/2021 40.8 37.5 - 51.0 % Final     Platelets   Date Value Ref Range Status   09/18/2021 130 (L) 140 - 450 10*3/mm3 Final      DATE OF EXAM:  9/17/2021 2:00 PM     PROCEDURE:  CT CHEST PULMONARY EMBOLISM-     INDICATIONS:   pain     COMPARISON:   No Comparisons Available     TECHNIQUE:  Routine transaxial slices were obtained through chest after  administration of intravenous 100 ml of Isovue 370. Reconstructed  coronal and sagittal images were also obtained. Automated exposure  control and iterative reconstruction methods were used.      FINDINGS:  The lungs appear clear. There is no mediastinal or hilar  lymphadenopathy. There are large bilateral pulmonary emboli present. On  the right, there is a large embolus filling the distal right main  pulmonary artery and extending into the upper and lower lobe pulmonary  arteries. On the left, there is a large embolus at the bifurcation into  the upper and lower pole renal arteries with a large amount of post  filling the proximal lower lobe pulmonary artery. The upper abdomen  appears normal is mild right heart dilatation suggesting right heart  strain.     IMPRESSION:  Extensive bilateral pulmonary emboli with evidence of right heart  strain.     Electronically Signed By-Dillan Terrell MD On:9/17/2021 2:35 PM      CARDIAC CATHETERIZATION DR CHUN:  This report was finalized on [id number] by  Dillan Terrell MD.  Pulmonary angiography was then performed.  Following that we brought an Amplatz extra-support wire up and positioned that into the inferior branch of the right pulmonary artery.   I advanced the Aspiration Guide Catheter (AGC) up into the right pulmonary artery.  We then did aspiration thrombectomy x4 with a tremendous visual removal of thrombus.   We did aspiration until there was no further removal of embolus.  We did an angiogram through the AGC catheter and it  revealed essentially all of the embolus had been removed.  I then exchanged out the AGC and brought the BFC up and floated it into the left pulmonary artery.  A at that point we then brought the Glidewire advantage up into the left lung and I then placed the Grollman catheter in the left lung and pulmonary angiography was performed.  We then used a Glidewire advantage to get into the inferior segment of the lung and we exchanged out and position the AGC into the left lung over an Amplatz extra-support wire. Aspiration thrombectomy of the left lung was performed using the AGC x2.  We continue to aspirate until no further embolus was returned.  I then did an angiogram through the AGC catheter and it also removed that the vast majority of the embolus have been removed.  The BFC was repositioned into the pulmonary artery and repeat pulmonary pressures and pulmonary arterial saturation were obtained.  At that point we felt we had accomplished everything we could with this and we removed the catheter.  We then removed the 24 Swedish dry seal sheath and deployed the preclosed suture with great results and hemostasis was obtained.  The patient tolerated the procedure well without early complication.      FINDINGS:  Right heart cath                              Pre                                    Post                              Pressure  RA                       6                                        6  RV                       52/14                                35/9  PA                       52/15 mean 25                 35/8 mean 18     PA sat                  70                                      75     Pulmonary angiography:  Right pulmonary artery: Large amount of central embolus involving all segments of the right pulmonary artery, delayed perfusion through the entire right lung except for the very superior portion     Post angiogram no embolus and normal perfusion throughout the right lung     Left pulmonary  artery: Large central embolus mainly in the mid and inferior segment of the left pulmonary artery with reduced perfusion     Post angiogram no embolus and normal perfusion throughout the left lung     Summary: Highly successful pulmonary embolectomy bilaterally.  Will hold the heparin for 4 hours and then start Eliquis tonight when ask the hematologist to see him for a hypercoagulable evaluation.  Hopefully he may even be able to go home tomorrow.    Assessment/Plan      I had the opportunity to see this delightful  today. Obviously the story of this patient is very typical. He drove to [place] on his own for 20 hours, not stopping. He developed pain in the calf of the right lower extremity that he called charley horse. He has shortness of breath presumed to be related to altitude sickness in [place] and upon returning to the [place] the shortness of breath remained ongoing and became worse. Since then he has had times that he has been able to do his workouts with no major difficulties and later on his workouts have been minimized given the shortness of breath produced by minimal physical activity. A few days ago he had a new pain in the right calf close to the Achilles tendon. While mowing the grass yesterday he had syncope and very likely the explanation of all this is his pulmonary vasculature could not accommodate anymore blood clots and the flow through the left ventricle from the pulmonary veins was minimized producing syncope. The patient is alive by miracle. The procedure done by Dr. Cantu and stated above is more than dramatic in the description and tells us the amount of clots that this patient had. I pointed out to the patient that it is a miracle that he stayed alive. Many people go through this and they do not live to tell a story. Therefore, if somebody needs to have flowers and chocolates, it is Dr. Cantu and the emergency room team at [facility]. I think the quick transition  and the care that this patient received is world quality and deserves to be credited.     The patient has no personal history of thrombophilia before. He has no family history of thrombophilia. He has not been taking anabolic steroids. His white count, hemoglobin and platelets are normal. His platelet count is minimally low because of platelet consumption associated with massive amount of clotting. Chemistry profile is normal. The CT angiogram from [facility] has been reviewed. Eventually we will need to ask the radiologist to amend the report because he mentioned something in the renal artery that probably is a type error. The description by Dr. Cantu has been reviewed.     The patient is already receiving Eliquis and I advised him that he will remain on Eliquis at least for a year and in my opinion probably for the rest of his life. Obviously we need to proceed with thrombophilia assessment but I think in my opinion the most likely factor that triggered all this was the long trip to [place]. He probably has had showers and showers and showers of clots and emboli and his pulmonary circulation could not accommodate anymore clots. He ran out of physical space to put more clots and the circulation into the left side of the heart through the pulmonary veins ran out. I pointed out to him that it is like if the main water entrance of the water to the house has a clog, the rest of the house is not going to have any water. In any event the patient is up and running. He looks terrific. He has no clinical evidence of pulmonary hypertension on clinical examination and it is amazing that he is alive. I think the tolerance to all this is because he is a very fit individual who exercises all the time.     Obviously I am going to need to proceed with thrombophilia assessment that will include factor V Leiden mutation, prothrombin gene mutation, antithrombin III, lupus anticoagulant, anti-glycoprotein antibody  profile, anticardiolipin antibody profile as well as protein C, protein S, fibrinogen, lipid profile as well as lupus anticoagulant. I will perform as well a serum protein electrophoresis and serum free light chains.     The patient has had screening colonoscopy in the past, prostate examination being negative normal. No family history of malignancy. No family history of thrombophilia. I need to see this patient in the office in a couple of weeks to go through the report of his laboratory testing. Eventually we will need to repeat a CT angiogram of the chest to be sure that all of his clots in the pulmonary circulation are clean and I am sure Dr. Cantu eventually will perform another echocardiogram to be sure that there is resolution of his pulmonary hypertension by this methodology.     I pointed out to the patient that he will need to stay away from trauma and I made the description in the common sense utilization at this point.     The patient likes to drink his beers. He does not get drunk but I pointed out that anticoagulants are not good friends of alcohol and his wife will take care of this problem.     He raised the question if sex life will have anything to do with all these issues in regard to inability to perform and I pointed out to him that should not be an issue. I asked him to give himself a break until things get better in the next couple of weeks.     I will review him tomorrow.     I pointed out to the patient the gravity of his situation and how ill he was.    DURING THE VISIT WITH THE PATIENT TODAY , PATIENT HAD FACE MASK, I HAD PROPER PROTECTIVE EQUIPMENT, AND I DID HAND HYGIENE WITH SOAP AND WATER BEFORE AND AFTER THE VISIT.             9/18/2021      CC:            Electronically signed by Adonay Washington MD at 09/19/21 0848          Discharge Summary      Enrike Cantu MD at 09/19/21 1112          Christopherdominic Gupta  [id number]    Date of Admit: 9/17/2021  Date of Discharge:   9/19/2021    Discharge Diagnosis:  Active Hospital Problems    Diagnosis  POA   • Pulmonary emboli (CMS/HCC) [I26.99]  Yes      Resolved Hospital Problems   No resolved problems to display.       Hospital Course: Mr Gupta is a 60 year old male who presented to [facility] 9/17/21 with initial reports of shortness of breath and diaphoresis while cutting his grass. He stated [place] and attributed it to the [place].     He was found to have extensive bilateral pulmonary emboli with evidence of right heart strain on imaging, and subsequently taken for [facility] retrieval. He has remained stable postoperatively and is appropriate for discharge home today. He will be started on Eliquis for PE and follow up in our office in 1 week.     I have reviewed the above.  This is a gentleman who really does not have a lot of risk factors for thrombus that came in several weeks after a long road trip possibly that played a role with syncope and a submassive pulmonary embolus with marked pulmonary hypertension.  He underwent highly successful and artery embolectomy with normalization of his pulmonary pressures.  Angiographic resolution of the PEs.  He is found to have pretty extensive DVT in his distal femoral vein down to his popliteal.  That will be managed with anticoagulation.  Hematology consultation has been seen and labs have been drawn.  He will get early follow-up with my nurse practitioner and I will see him in a month and at that time will need an echo also to follow-up his right-sided enlargement    Procedures Performed  Procedure(s):  Percutaneous Manual Thrombectomy- INARI  Right Heart Cath  Pumonary angiography -Bilateral       Consults     Date and Time Order Name Status Description    9/18/2021  1:10 PM Hematology & Oncology Inpatient Consult Completed           Discharge Medications     Your medication list      START taking these medications      Instructions Last  Dose Given Next Dose Due   Apixaban Starter Pack tablet therapy pack      Take two 5 mg tablets by mouth every 12 hours for 7 days. Followed by one 5 mg tablet every 12 hours. (Dispense starter pack if available)          CONTINUE taking these medications      Instructions Last Dose Given Next Dose Due   Claritin 10 MG tablet  Generic drug: loratadine      Take 10 mg by mouth Daily.       escitalopram 20 MG tablet  Commonly known as: LEXAPRO      Take 20 mg by mouth Daily.       Triamcinolone Acetonide 55 MCG/ACT nasal inhaler  Commonly known as: NASACORT      2 sprays into the nostril(s) as directed by provider Daily.          STOP taking these medications    ibuprofen 200 MG tablet  Commonly known as: ADVIL,MOTRIN              Where to Get Your Medications      These medications were sent to [facility]/pharmacy #[id number] - [place], [place] - [address] [place] [address] [phone]  - [phone] [place] [address] [place] [id number]    Hours: 24-hours Phone: [phone]   · Apixaban Starter Pack tablet therapy pack         Discharge Diet: regular    Activity at Discharge: as tolerates    Discharge disposition: home    Condition on Discharge: stable    Follow-up Appointments  No future appointments.  Additional Instructions for the Follow-ups that You Need to Schedule     Discharge Follow-up with Specified Provider: Dr Cantu; 1 Month   As directed      To: Dr Cantu    Follow Up: 1 Month         Discharge Follow-up with Specified Provider: LINA Harvey or LINA Mast; 1 Week   As directed      To: LINA Harvey or LINA Mast    Follow Up: 1 Week               Test Results Pending at Discharge  Pending Labs     Order Current Status    Antithrombin III In process    Beta-2 Glycoprotein Antibodies In process    Cardiolipin Antibody In process    Factor 5 Leiden In process    Factor II, DNA Analysis In process    ROCIO + PE In process    Immunoglobulin Free LT Chains Blood In process    Lupus Anticoagulant In  process    Protein C Activity In process    Protein S Antigen, Free In process    Protein S Functional In process           Enrike Cantu MD  09/19/21  12:09 EDT                          Electronically signed by Enrike Cantu MD at 09/19/21 1219

## 2021-09-20 NOTE — PROGRESS NOTES
Subjective  MASSIVE PULMONARY EMBOLISM WITH SEVERE PULMONARY HYPERTENSION, LIKELY CLOTH IN RLE WITH FREQUENT SHOWERS OF EMBOLI SINCE July 24/21 AFTER 20 HS DRIVE TO [place]        History of Present Illness delightful 60-year-old white male who was mowing his grass yesterday and after completion of his task, he was feeling significant amount of shortness of breath and chest pressure and he had a syncopal episode. This was witnessed by his neighbors, 911 was called and he was brought to [facility] Emergency Room where a diagnosis of a massive pulmonary embolism was made. The patient was placed on heparin and shipped to [facility] Dr. Cantu took care of him after he has undergone an embolectomy. The patient is now receiving Eliquis. His symptoms pertinent to yesterday are dramatically better. He is no longer having any shortness of breath, chest pain, pressure, cough, hemoptysis or pleuritic pain. He has no palpitations.      It happens to be that the patient drove all the way on his own to [place] 20 hours, stopping only for gas and food on 1 occasion. During the trip he developed a significant so-called charley horse in the right lower extremity calf that remained persistent and then disappeared for a few days while in [place]. He gained some weight and he thought that altitude was giving him shortness of breath. Upon returning to [place] a few days later the patient’s shortness of breath persisted and when he was doing his workouts he was feeling significantly decreased in his ability to function including treadmills and weightlifting. Some days last week the symptoms became so bad that he had to stop doing his physical activity because he was very short winded. He developed pain again in the right calf close to the Achilles and that persisted for at least 2 days, taking some Aleve with mild improvement. He has not had any swelling that he could tell before but now he has had  some swelling in the last few days. The left calf has been normal. The patient has not had any fever or chills. He also thought that he had some respiratory allergies and he blamed this to the picture. In fact he has not had too much rhinorrhea, sneezing, itching or anything of this nature. He also has had some loose bowel activity for a few days. He has not had any passage of blood in the stool. Urination has been normal. He gained weight, almost 20 pounds while being in [place]. He lost almost 6 to 10 back into [place] modifying his diet and physical activity. He has not had any rash in the skin. No joint pain. No neurological symptomatology.     This afternoon he feels perfectly back to normal baseline but he is resting in the bed after his procedure.        The patient has no history of hypertension, diabetes, hyperlipidemia. He has no history of asthma. He has history of upper respiratory allergies. He has had colonoscopy in the past, being negative normal and PSA has been checked being negative normal. He never has had skin cancer. He has not had any other medical illness. He is perfectly healthy and typically he does not take any medicines. He takes occasional vitamins. He never has been exposed to anabolic steroids.   The patient was further reviewed today, 09/19/21. He has not had any problems through the night, no pain, no nausea, no cough, no sputum production, no shortness of breath, no pleuritic pain. No hemoptysis. He has been taken down for the Doppler study of the lower extremities this morning. He has not had any clinical bleeding. Cardiologist already mentioned possible discharge today.         Past Medical History, Past Surgical History, Social History, Family History have been reviewed and are without significant changes except as mentioned.    Review of Systems   Musculoskeletal:        TENDER R CALF   All other systems reviewed and are negative.     A comprehensive 14 point review of  systems was performed and was negative except as mentioned.    Medications:  The current medication list was reviewed in the EMR    ALLERGIES:    Allergies   Allergen Reactions   • Penicillins Rash       Objective      Vitals:    09/18/21 2305 09/19/21 0655 09/19/21 0706 09/19/21 1134   BP: 105/61  114/75 127/80   BP Location: Left arm  Left arm Left arm   Patient Position: Lying  Lying Lying   Pulse: 55  55 57   Resp: 16  16 16   Temp: 99.1 °F (37.3 °C)  98.7 °F (37.1 °C) 98.4 °F (36.9 °C)   TempSrc: Oral  Oral Oral   SpO2: 93%  92% 95%   Weight:  97.8 kg (215 lb 8 oz)       No flowsheet data found.    Physical Exam  Eyes and oral mucosa were normal. No adenopathies in the neck. Lungs were clear. Heart was regular, no gallops and no evidence of pulmonary hypertension by auscultation. His abdomen was soft. His extremities disclosed minimal tenderness in the calves and dry skin. He was awake, alert, and oriented to time.         RECENT LABS:  Hematology WBC   Date Value Ref Range Status   09/19/2021 6.58 3.40 - 10.80 10*3/mm3 Final     RBC   Date Value Ref Range Status   09/19/2021 4.11 (L) 4.14 - 5.80 10*6/mm3 Final     Hemoglobin   Date Value Ref Range Status   09/19/2021 12.6 (L) 13.0 - 17.7 g/dL Final     Hematocrit   Date Value Ref Range Status   09/19/2021 37.3 (L) 37.5 - 51.0 % Final     Platelets   Date Value Ref Range Status   09/19/2021 144 140 - 450 10*3/mm3 Final       Interpretation Summary DOPPLER     · Acute right lower extremity deep vein thrombosis noted in the distal femoral, popliteal, posterior tibial, peroneal and soleal.  · All other veins appeared normal bilaterally         Assessment/Plan  He drove to [place] on his own for 20 hours, not stopping. He developed pain in the calf of the right lower extremity that he called charley horse. He has shortness of breath presumed to be related to altitude sickness in [place] and upon returning to the [place] the shortness of breath remained ongoing  and became worse. Since then he has had times that he has been able to do his workouts with no major difficulties and later on his workouts have been minimized given the shortness of breath produced by minimal physical activity. A few days ago he had a new pain in the right calf close to the Achilles tendon. While mowing the grass yesterday he had syncope and very likely the explanation of all this is his pulmonary vasculature could not accommodate anymore blood clots and the flow through the left ventricle from the pulmonary veins was minimized producing syncope. The patient is alive by miracle. The procedure done by Dr. Cantu and stated above is more than dramatic in the description and tells us the amount of clots that this patient had. I pointed out to the patient that it is a miracle that he stayed alive. Many people go through this and they do not live to tell a story. Therefore, if somebody needs to have flowers and chocolates, it is Dr. Cantu and the emergency room team at [facility]. I think the quick transition and the care that this patient received is world quality and deserves to be credited.      The patient has no personal history of thrombophilia before. He has no family history of thrombophilia. He has not been taking anabolic steroids. His white count, hemoglobin and platelets are normal. His platelet count is minimally low because of platelet consumption associated with massive amount of clotting. Chemistry profile is normal. The CT angiogram from [facility] has been reviewed. Eventually we will need to ask the radiologist to amend the report because he mentioned something in the renal artery that probably is a type error. The description by Dr. Cantu has been reviewed.      The patient is already receiving Eliquis and I advised him that he will remain on Eliquis at least for a year and in my opinion probably for the rest of his life. Obviously we need to proceed with  thrombophilia assessment but I think in my opinion the most likely factor that triggered all this was the long trip to [place]. He probably has had showers and showers and showers of clots and emboli and his pulmonary circulation could not accommodate anymore clots. He ran out of physical space to put more clots and the circulation into the left side of the heart through the pulmonary veins ran out. I pointed out to him that it is like if the main water entrance of the water to the house has a clog, the rest of the house is not going to have any water. In any event the patient is up and running. He looks terrific. He has no clinical evidence of pulmonary hypertension on clinical examination and it is amazing that he is alive. I think the tolerance to all this is because he is a very fit individual who exercises all the time.      Obviously I am going to need to proceed with thrombophilia assessment that will include factor V Leiden mutation, prothrombin gene mutation, antithrombin III, lupus anticoagulant, anti-glycoprotein antibody profile, anticardiolipin antibody profile as well as protein C, protein S, fibrinogen, lipid profile as well as lupus anticoagulant. I will perform as well a serum protein electrophoresis and serum free light chains.      The patient has had screening colonoscopy in the past, prostate examination being negative normal. No family history of malignancy. No family history of thrombophilia. I need to see this patient in the office in a couple of weeks to go through the report of his laboratory testing. Eventually we will need to repeat a CT angiogram of the chest to be sure that all of his clots in the pulmonary circulation are clean and I am sure Dr. Cantu eventually will perform another echocardiogram to be sure that there is resolution of his pulmonary hypertension by this methodology.      I pointed out to the patient that he will need to stay away from trauma and I made the  description in the common sense utilization at this point.      The patient likes to drink his beers. He does not get drunk but I pointed out that anticoagulants are not good friends of alcohol and his wife will take care of this problem.      He raised the question if sex life will have anything to do with all these issues in regard to inability to perform and I pointed out to him that should not be an issue. I asked him to give himself a break until things get better in the next couple of weeks.      I will review him tomorrow.      I pointed out to the patient the gravity of his situation and how ill he was.   The patient was reviewed with the patient’s wife in the room. He has been taken down to have his Doppler study that documents an extensive thrombosis of the femoropopliteal, posterior tibialis and peroneal veins on the right side. This is not surprising. The clinical manifestations were typical.     The patient feels fine today. He is on Eliquis, no clinical bleeding. Cardiology is talking about discharge and I have no problems with this at all. As long as the patient refills his Eliquis prescription and takes the medicine religiously like he was instructed yesterday, he should not have any problems. My office will be making an appointment to come back and see me in a couple of weeks and review his thrombophilia labs and go from there. I pointed to the patient and his wife that he probably will require anticoagulation for life independent to what his thrombophilia labs show.     Eventually we will recheck CT angiogram of the chest and perform a CTA of the abdomen and pelvis.       DURING THE VISIT WITH THE PATIENT TODAY , PATIENT HAD FACE MASK, I HAD PROPER PROTECTIVE EQUIPMENT, AND I DID HAND HYGIENE WITH SOAP AND WATER BEFORE AND AFTER THE VISIT.                9/19/2021      CC:

## 2021-09-21 ENCOUNTER — TELEPHONE (OUTPATIENT)
Dept: CARDIOLOGY | Facility: CLINIC | Age: 61
End: 2021-09-21

## 2021-09-21 LAB
ALBUMIN SERPL ELPH-MCNC: 3.4 G/DL (ref 2.9–4.4)
ALBUMIN/GLOB SERPL: 1.4 {RATIO} (ref 0.7–1.7)
ALPHA1 GLOB SERPL ELPH-MCNC: 0.2 G/DL (ref 0–0.4)
ALPHA2 GLOB SERPL ELPH-MCNC: 0.5 G/DL (ref 0.4–1)
AT III PPP CHRO-ACNC: 80 % (ref 90–134)
B-GLOBULIN SERPL ELPH-MCNC: 0.9 G/DL (ref 0.7–1.3)
CARDIOLIPIN IGA SER IA-ACNC: <9 APL U/ML (ref 0–11)
CARDIOLIPIN IGG SER IA-ACNC: <9 GPL U/ML (ref 0–14)
CARDIOLIPIN IGM SER IA-ACNC: 24 MPL U/ML (ref 0–12)
GAMMA GLOB SERPL ELPH-MCNC: 1 G/DL (ref 0.4–1.8)
GLOBULIN SER-MCNC: 2.6 G/DL (ref 2.2–3.9)
IGA SERPL-MCNC: 270 MG/DL (ref 90–386)
IGG SERPL-MCNC: 945 MG/DL (ref 603–1613)
IGM SERPL-MCNC: 213 MG/DL (ref 20–172)
INTERPRETATION SERPL IEP-IMP: ABNORMAL
KAPPA LC FREE SER-MCNC: 19 MG/L (ref 3.3–19.4)
KAPPA LC FREE/LAMBDA FREE SER: 1.1 {RATIO} (ref 0.26–1.65)
LABORATORY COMMENT REPORT: ABNORMAL
LAMBDA LC FREE SERPL-MCNC: 17.3 MG/L (ref 5.7–26.3)
M PROTEIN SERPL ELPH-MCNC: ABNORMAL G/DL
PROT C ACT/NOR PPP: 89 % (ref 86–163)
PROT S ACT/NOR PPP: 84 % (ref 70–127)
PROT S FREE PPP-ACNC: 97 % (ref 49–138)
PROT SERPL-MCNC: 6 G/DL (ref 6–8.5)

## 2021-09-21 NOTE — TELEPHONE ENCOUNTER
Dr Cantu,  pts wife is calling in to let you know that when he got up earlier he became lightheaded.  She really thinks he may have just gotten up too fast, but just wanted to double check with us.    She doesn't have a bp cuff to track his bp or heart rate and I have advised they get one.  She did mention that he is feeling fine now.    I have scheduled him to be seen by Debbie on Monday.  I have advised she track bp and heart rate between now and then and bring log to fu.  Also instructed that he keep him self hydrated.    Do you have any other recommendations at this time?  Thanks  Crys Sherman RN  Triage nurse

## 2021-09-21 NOTE — TELEPHONE ENCOUNTER
----- Message from Elizabeth Trinh sent at 9/21/2021  2:53 PM EDT -----  Regarding: FW: hospital fu  please advise  ----- Message -----  From: Darleen Alexander RN  Sent: 9/19/2021  11:10 AM EDT  To: [place] [facility] [place] Referral Coordinator  Subject: hospital fu                                      FU with RS or LR in 1 week    FU with WD in 1 month    Thanks!

## 2021-09-22 LAB
APTT SCREEN TO CONFIRM RATIO: 0.92 RATIO (ref 0–1.4)
B2 GLYCOPROT1 IGA SER-ACNC: <9 GPI IGA UNITS (ref 0–25)
B2 GLYCOPROT1 IGG SER-ACNC: <9 GPI IGG UNITS (ref 0–20)
B2 GLYCOPROT1 IGM SER-ACNC: <9 GPI IGM UNITS (ref 0–32)
CONFIRM APTT/NORMAL: 41.6 SEC (ref 0–55)
LA 2 SCREEN W REFLEX-IMP: ABNORMAL
SCREEN APTT: 33.4 SEC (ref 0–51.9)
SCREEN DRVVT: 39.5 SEC (ref 0–47)
THROMBIN TIME: >150 SEC (ref 0–23)
TT IMM NP PPP: 55 SEC (ref 0–23)
TT P HPASE PPP: 20.5 SEC (ref 0–23)

## 2021-09-27 ENCOUNTER — OFFICE VISIT (OUTPATIENT)
Dept: CARDIOLOGY | Facility: CLINIC | Age: 61
End: 2021-09-27

## 2021-09-27 DIAGNOSIS — I26.02 ACUTE SADDLE PULMONARY EMBOLISM WITH ACUTE COR PULMONALE (HCC): Primary | ICD-10-CM

## 2021-09-27 DIAGNOSIS — I82.4Z1 ACUTE DEEP VEIN THROMBOSIS (DVT) OF DISTAL VEIN OF RIGHT LOWER EXTREMITY (HCC): ICD-10-CM

## 2021-09-27 PROCEDURE — 93000 ELECTROCARDIOGRAM COMPLETE: CPT | Performed by: NURSE PRACTITIONER

## 2021-09-27 PROCEDURE — 99214 OFFICE O/P EST MOD 30 MIN: CPT | Performed by: NURSE PRACTITIONER

## 2021-09-27 NOTE — PROGRESS NOTES
Date of Office Visit: 2021  Encounter Provider: LINA Acosta  Place of Service: [facility] CARDIOLOGY  Patient Name: Christopher Gupta  :1960    Chief Complaint   Patient presents with   • Pulmonary Embolism   :     HPI: Christopher Gupta is a 60 y.o. male who presents today for follow-up.  Old records have been obtained and reviewed by me.  He is to have patient with no significant past medical history.  On 2021, he experienced a syncopal episode while cutting his grass.  He presented to the ED at [facility] where he underwent a CT of the chest revealing extensive bilateral pulmonary emboli with evidence of right heart strain.  Subsequently, he was transferred to [facility].  Echocardiogram demonstrated an EF of 70%, severely dilated RV cavity with moderately educed RV systolic function and an elevated RVSP greater than 55 mmHg.  On 2021, he underwent a highly successful bilateral pulmonary embolectomy.  The following day, lower extremity Doppler demonstrated an acute right lower extremity DVT in the distal femoral, popliteal, posterior tibial, peroneal, and soleal.  Hematology was consulted for thrombophilia assessment.  He will likely need lifelong anticoagulation.  On 2021, he was discharged.  He was here today for follow-up.   Overall he has been feeling much better.  He has a few concerns today regarding his low blood pressure and low resting heart rates.  He is relatively asymptomatic outside of one day where he had a dizzy spell with standing.  His breathing is back to baseline for the most part.  He was carrying some heavy objects this morning and he did have some shortness of breath that was short-lived.  He has had a little cramping in his right calf.  He denies any chest pain, palpitations, edema, or syncope.  He denies any bleeding difficulties or melena.    History reviewed. No pertinent past medical  history.    Past Surgical History:   Procedure Laterality Date   • APPENDECTOMY     • CARDIAC CATHETERIZATION N/A 9/18/2021    Procedure: Percutaneous Manual Thrombectomy- INARI;  Surgeon: Enrike Cantu MD;  Location:  [facility] CATH INVASIVE LOCATION;  Service: Cardiovascular;  Laterality: N/A;   • CARDIAC CATHETERIZATION N/A 9/18/2021    Procedure: Right Heart Cath;  Surgeon: Enrike Cantu MD;  Location:  [facility] CATH INVASIVE LOCATION;  Service: Cardiovascular;  Laterality: N/A;   • EMBOLECTOMY     • INTERVENTIONAL RADIOLOGY PROCEDURE Bilateral 9/18/2021    Procedure: Pumonary angiography -Bilateral;  Surgeon: Enrike Cantu MD;  Location:  [facility] CATH INVASIVE LOCATION;  Service: Cardiovascular;  Laterality: Bilateral;   • TONSILLECTOMY         Social History     Socioeconomic History   • Marital status:      Spouse name: Not on file   • Number of children: Not on file   • Years of education: Not on file   • Highest education level: Not on file   Tobacco Use   • Smoking status: Never Smoker   • Smokeless tobacco: Never Used   Vaping Use   • Vaping Use: Never used   Substance and Sexual Activity   • Alcohol use: Yes     Alcohol/week: 6.0 standard drinks     Types: 6 Cans of beer per week     Comment: caffine: coffee daily    • Drug use: Never   • Sexual activity: Defer       Family History   Problem Relation Age of Onset   • Hypertension Mother    • Hypertension Father        Review of Systems   Constitutional: Negative.   Cardiovascular: Negative.  Negative for chest pain, dyspnea on exertion, leg swelling, orthopnea, paroxysmal nocturnal dyspnea and syncope.   Respiratory: Negative.    Hematologic/Lymphatic: Negative for bleeding problem.   Musculoskeletal: Negative for falls.   Gastrointestinal: Negative for melena.   Neurological: Positive for light-headedness. Negative for dizziness.       Allergies   Allergen Reactions   • Penicillins Rash         Current Outpatient Medications:   •  Apixaban  "Starter Pack tablet therapy pack, Take two 5 mg tablets by mouth every 12 hours for 7 days. Followed by one 5 mg tablet every 12 hours. (Dispense starter pack if available), Disp: 74 tablet, Rfl: 0  •  escitalopram (LEXAPRO) 20 MG tablet, Take 20 mg by mouth Daily., Disp: , Rfl:   •  loratadine (Claritin) 10 MG tablet, Take 10 mg by mouth Daily., Disp: , Rfl:   •  Triamcinolone Acetonide (NASACORT) 55 MCG/ACT nasal inhaler, 2 sprays into the nostril(s) as directed by provider Daily., Disp: , Rfl:       Objective:     Vitals:    09/27/21 1440   BP: 100/60   Pulse: 56   Weight: 109 kg (241 lb)   Height: 177.8 cm (70\")     Body mass index is 34.58 kg/m².    PHYSICAL EXAM:    Neck:      Vascular: No JVD.   Pulmonary:      Effort: Pulmonary effort is normal.      Breath sounds: Normal breath sounds.   Cardiovascular:      Normal rate. Regular rhythm.      Murmurs: There is no murmur.      No gallop. No click. No rub.   Pulses:     Intact distal pulses.   Skin:     Comments: Right groin clean, dry, and intact with no erythema or edema.            ECG 12 Lead    Date/Time: 9/27/2021 2:48 PM  Performed by: Debbie Mar APRN  Authorized by: Debbie Mar APRN   Comparison: compared with previous ECG from 9/17/2021  Similar to previous ECG  Rhythm: sinus bradycardia  Rate: bradycardic  BPM: 56  T inversion: III and V3  Other findings: non-specific ST-T wave changes    Clinical impression: normal ECG  Comments: Indication: PE              Assessment:       Diagnosis Plan   1. Acute saddle pulmonary embolism with acute cor pulmonale (HCC)  ECG 12 Lead   2. Acute deep vein thrombosis (DVT) of distal vein of right lower extremity (HCC)       Orders Placed This Encounter   Procedures   • ECG 12 Lead     This order was created via procedure documentation     Order Specific Question:   Release to patient     Answer:   Immediate          Plan:       1.  Saddle pulmonary embolism with acute cor pulmonale.  Status post " bilateral thrombectomy.  He is anticoagulated on Eliquis.  Follow-up echocardiogram in 1 month for reassessment of the right ventricle.       2.  Right lower extremity DVT.  Continue Eliquis therapy.      I think he is doing well.  He denies any symptoms of angina or heart failure.  I spent most of the appointment reassuring he and his wife.  They have been pretty nervous since this event, rightly so.  I am not concerned about his low blood pressure or heart rate, especially given he is so asymptomatic.  He is not in any medication that should be contributing to this.  I think he should start to slowly resume his activity.  I am not going to make any changes today.  He has an appointment with Dr. Washington on 10/4/2021 and with Dr. Cantu on 10/22/2021.      As always, it has been a pleasure to participate in your patient's care.      Sincerely,         LINA Harvey

## 2021-09-29 VITALS
SYSTOLIC BLOOD PRESSURE: 100 MMHG | HEIGHT: 70 IN | BODY MASS INDEX: 30.64 KG/M2 | WEIGHT: 214 LBS | HEART RATE: 56 BPM | DIASTOLIC BLOOD PRESSURE: 60 MMHG

## 2021-10-04 ENCOUNTER — OFFICE VISIT (OUTPATIENT)
Dept: ONCOLOGY | Facility: CLINIC | Age: 61
End: 2021-10-04

## 2021-10-04 ENCOUNTER — LAB (OUTPATIENT)
Dept: LAB | Facility: HOSPITAL | Age: 61
End: 2021-10-04

## 2021-10-04 VITALS
HEART RATE: 59 BPM | SYSTOLIC BLOOD PRESSURE: 123 MMHG | BODY MASS INDEX: 31.68 KG/M2 | DIASTOLIC BLOOD PRESSURE: 83 MMHG | TEMPERATURE: 98.2 F | HEIGHT: 69 IN | RESPIRATION RATE: 14 BRPM | WEIGHT: 213.9 LBS | OXYGEN SATURATION: 97 %

## 2021-10-04 DIAGNOSIS — I82.4Z1 ACUTE DEEP VEIN THROMBOSIS (DVT) OF DISTAL VEIN OF RIGHT LOWER EXTREMITY (HCC): Primary | ICD-10-CM

## 2021-10-04 DIAGNOSIS — R76.0 ANTICARDIOLIPIN ANTIBODY POSITIVE: ICD-10-CM

## 2021-10-04 DIAGNOSIS — D69.6 THROMBOCYTOPENIA, ACQUIRED (HCC): ICD-10-CM

## 2021-10-04 DIAGNOSIS — I26.02 ACUTE SADDLE PULMONARY EMBOLISM WITH ACUTE COR PULMONALE (HCC): ICD-10-CM

## 2021-10-04 LAB
ALBUMIN SERPL-MCNC: 4.5 G/DL (ref 3.5–5.2)
ALBUMIN/GLOB SERPL: 1.6 G/DL (ref 1.1–2.4)
ALP SERPL-CCNC: 100 U/L (ref 38–116)
ALT SERPL W P-5'-P-CCNC: 24 U/L (ref 0–41)
ANION GAP SERPL CALCULATED.3IONS-SCNC: 11.3 MMOL/L (ref 5–15)
AST SERPL-CCNC: 37 U/L (ref 0–40)
BASOPHILS # BLD AUTO: 0.04 10*3/MM3 (ref 0–0.2)
BASOPHILS NFR BLD AUTO: 0.4 % (ref 0–1.5)
BILIRUB SERPL-MCNC: 0.5 MG/DL (ref 0.2–1.2)
BUN SERPL-MCNC: 26 MG/DL (ref 6–20)
BUN/CREAT SERPL: 23 (ref 7.3–30)
CALCIUM SPEC-SCNC: 9.5 MG/DL (ref 8.5–10.2)
CHLORIDE SERPL-SCNC: 103 MMOL/L (ref 98–107)
CO2 SERPL-SCNC: 24.7 MMOL/L (ref 22–29)
CREAT SERPL-MCNC: 1.13 MG/DL (ref 0.7–1.3)
DEPRECATED RDW RBC AUTO: 40.1 FL (ref 37–54)
EOSINOPHIL # BLD AUTO: 0.14 10*3/MM3 (ref 0–0.4)
EOSINOPHIL NFR BLD AUTO: 1.4 % (ref 0.3–6.2)
ERYTHROCYTE [DISTWIDTH] IN BLOOD BY AUTOMATED COUNT: 12 % (ref 12.3–15.4)
GFR SERPL CREATININE-BSD FRML MDRD: 66 ML/MIN/1.73
GLOBULIN UR ELPH-MCNC: 2.9 GM/DL (ref 1.8–3.5)
GLUCOSE SERPL-MCNC: 101 MG/DL (ref 74–124)
HCT VFR BLD AUTO: 44.8 % (ref 37.5–51)
HGB BLD-MCNC: 14.8 G/DL (ref 13–17.7)
IMM GRANULOCYTES # BLD AUTO: 0.02 10*3/MM3 (ref 0–0.05)
IMM GRANULOCYTES NFR BLD AUTO: 0.2 % (ref 0–0.5)
LYMPHOCYTES # BLD AUTO: 1.69 10*3/MM3 (ref 0.7–3.1)
LYMPHOCYTES NFR BLD AUTO: 16.6 % (ref 19.6–45.3)
MCH RBC QN AUTO: 30.1 PG (ref 26.6–33)
MCHC RBC AUTO-ENTMCNC: 33 G/DL (ref 31.5–35.7)
MCV RBC AUTO: 91.1 FL (ref 79–97)
MONOCYTES # BLD AUTO: 0.77 10*3/MM3 (ref 0.1–0.9)
MONOCYTES NFR BLD AUTO: 7.6 % (ref 5–12)
NEUTROPHILS NFR BLD AUTO: 7.51 10*3/MM3 (ref 1.7–7)
NEUTROPHILS NFR BLD AUTO: 73.8 % (ref 42.7–76)
NRBC BLD AUTO-RTO: 0 /100 WBC (ref 0–0.2)
PLATELET # BLD AUTO: 199 10*3/MM3 (ref 140–450)
PMV BLD AUTO: 9.1 FL (ref 6–12)
POTASSIUM SERPL-SCNC: 4.8 MMOL/L (ref 3.5–4.7)
PROT SERPL-MCNC: 7.4 G/DL (ref 6.3–8)
RBC # BLD AUTO: 4.92 10*6/MM3 (ref 4.14–5.8)
SODIUM SERPL-SCNC: 139 MMOL/L (ref 134–145)
WBC # BLD AUTO: 10.17 10*3/MM3 (ref 3.4–10.8)

## 2021-10-04 PROCEDURE — 85025 COMPLETE CBC W/AUTO DIFF WBC: CPT

## 2021-10-04 PROCEDURE — 99215 OFFICE O/P EST HI 40 MIN: CPT | Performed by: INTERNAL MEDICINE

## 2021-10-04 PROCEDURE — 36415 COLL VENOUS BLD VENIPUNCTURE: CPT

## 2021-10-04 PROCEDURE — 80053 COMPREHEN METABOLIC PANEL: CPT

## 2021-10-04 NOTE — PROGRESS NOTES
Subjective           DURING THE VISIT WITH THE PATIENT TODAY , PATIENT HAD FACE MASK, MY MEDICAL ASSISTANT AND I  HAD PROPPER PROTECTIVE EQUIPMENT, AND I DID HAND HYGIENE WITH SOAP AND WATER BEFORE AND AFTER THE VISIT.     REASONS FOR FOLLOWUP:1  MASSIVE PULMONARY EMBOLISM WITH SEVERE PULMONARY HYPERTENSION REQUIRING CATHETER EMBOLECTOMY  AND SUBSEQUENT ANTICOAGULATION WITH ELIQUIS.  2. DVT RLE AFTER LONG DRIVE 20 HS TO [place].  3. POSITIVE ANTICARDIOLIPIN ANTIBODY IGM. NEGATIVE LUPUS ANTICOAGULANT.    HISTORY OF PRESENT ILLNESS:  The patient is a 60 y.o. year old male  who is here for follow-up with the above-mentioned history.  This patient returns today to the office for followup in company of his wife after I saw him in consultation at [facility] several weeks ago when he was admitted after he had collapsed while mowing his yard at home, rescued by his neighbor, brought to the emergency room at [facility] and transferred subsequently to [facility] to undergo embolectomy by Dr. Cantu, for massive pulmonary embolism. The patient had severe pulmonary hypertension at the time of presentation. He had collapse with low blood pressure very likely associated with low blood flow from the left side of the heart. After the embolectomy that was dramatically successful, the patient was placed on anticoagulant, Eliquis, and subsequently he was discharged. The patient had a history of going to [place] to take a car to his daughter, driving for 20 hours almost nonstop and he developed swelling and pain in the right lower extremity that continued intermittently after he culminated all the issues pertinent to above. My notes from the original consultation were very explicit and described the history of the present illness extremely well in my opinion. In any event, since the patient was discharged from the hospital, he has been getting better and better as time goes by. He has very  occasional sensation of flip flop in his heart but no sensation of persistent tachyarrhythmia. No chest pain, no pleuritic pain, no hemoptysis, no cough, no shortness of breath. His appetite has been terrific. He has not had any diarrhea like he was having before and he has no abdominal pain, distention or jaundice. His urination is normal. He has had significant improvement but not complete resolution of the swelling in the right lower extremity with no pain. He has not had any clinical bleeding. He feels extremely well. He has not had any fever or infection. He reminded me that some of these events happened after he proceeded with his COVID vaccination weeks before the trip.      HEMATOLOGIC HISTORY:  Obviously the story of this patient is very typical. He drove to [place] on his own for 20 hours, not stopping. He developed pain in the calf of the right lower extremity that he called charley horse. He has shortness of breath presumed to be related to altitude sickness in [place] and upon returning to the [place] the shortness of breath remained ongoing and became worse. Since then he has had times that he has been able to do his workouts with no major difficulties and later on his workouts have been minimized given the shortness of breath produced by minimal physical activity. A few days ago he had a new pain in the right calf close to the Achilles tendon. While mowing the grass yesterday he had syncope and very likely the explanation of all this is his pulmonary vasculature could not accommodate anymore blood clots and the flow through the left ventricle from the pulmonary veins was minimized producing syncope. The patient is alive by miracle. The procedure done by Dr. Cantu and stated above is more than dramatic in the description and tells us the amount of clots that this patient had. I pointed out to the patient that it is a miracle that he stayed alive. Many people go through this and they do not live to  tell a story. Therefore, if somebody needs to have flowers and chocolates, it is Dr. Cantu and the emergency room team at [facility]. I think the quick transition and the care that this patient received is world quality and deserves to be credited.      The patient has no personal history of thrombophilia before. He has no family history of thrombophilia. He has not been taking anabolic steroids. His white count, hemoglobin and platelets are normal. His platelet count is minimally low because of platelet consumption associated with massive amount of clotting. Chemistry profile is normal. The CT angiogram from [facility] has been reviewed. Eventually we will need to ask the radiologist to amend the report because he mentioned something in the renal artery that probably is a type error. The description by Dr. Cantu has been reviewed.      The patient is already receiving Eliquis and I advised him that he will remain on Eliquis at least for a year and in my opinion probably for the rest of his life. Obviously we need to proceed with thrombophilia assessment but I think in my opinion the most likely factor that triggered all this was the long trip to [place]. He probably has had showers and showers and showers of clots and emboli and his pulmonary circulation could not accommodate anymore clots. He ran out of physical space to put more clots and the circulation into the left side of the heart through the pulmonary veins ran out. I pointed out to him that it is like if the main water entrance of the water to the house has a clog, the rest of the house is not going to have any water. In any event the patient is up and running. He looks terrific. He has no clinical evidence of pulmonary hypertension on clinical examination and it is amazing that he is alive. I think the tolerance to all this is because he is a very fit individual who exercises all the time.      Obviously I am going to need to  proceed with thrombophilia assessment that will include factor V Leiden mutation, prothrombin gene mutation, antithrombin III, lupus anticoagulant, anti-glycoprotein antibody profile, anticardiolipin antibody profile as well as protein C, protein S, fibrinogen, lipid profile as well as lupus anticoagulant. I will perform as well a serum protein electrophoresis and serum free light chains.      The patient has had screening colonoscopy in the past, prostate examination being negative normal. No family history of malignancy. No family history of thrombophilia. I need to see this patient in the office in a couple of weeks to go through the report of his laboratory testing. Eventually we will need to repeat a CT angiogram of the chest to be sure that all of his clots in the pulmonary circulation are clean and I am sure Dr. Cantu eventually will perform another echocardiogram to be sure that there is resolution of his pulmonary hypertension by this methodology.      I pointed out to the patient that he will need to stay away from trauma and I made the description in the common sense utilization at this point.      The patient likes to drink his beers. He does not get drunk but I pointed out that anticoagulants are not good friends of alcohol and his wife will take care of this problem.      He raised the question if sex life will have anything to do with all these issues in regard to inability to perform and I pointed out to him that should not be an issue. I asked him to give himself a break until things get better in the next couple of weeks.     THROMBOPHILIA  CLINICAL AND LABORATORY REPORT:  DATE:    10 /4 /2021     DIAGNOSIS: MASSIVE PE REQUIRING EMBOLECTOMY. DVT RLE      KEY: P EQUAL TO POSITIVE  , N  EQUAL TO NEGATIVE. NA : NOT APLICABLE    PREPARED BY HAYLEY DICKERSON MD STAYS IN THE CHART PERMANENTLY      CLINICAL FACTORS:     OBESITY: ___N___     DIABETES: __N____.    IMMOBILITY: __N____.    SMOKING HISTORY :  ______N______ PACK A DAY/ YEARS ________    LONG TRIPS BY CAR OR AIRPLANE: __YES 20 HS DRIVE TO [place]______    RECENT SURGERY: ____N______   LOCATION: ________    TRAUMA: ___N_____    MEDICATIONS:   BIRTH CONTROL MEDICATIONS ___N_____ ANDROGENS _N______ ESTROGENS ___N_____ HEPARIN __N______ OTHERS ____N___    PREGNANCY:   ____N____.    FAMILY HISTORY OF BLOOD CLOTHS:    POSITIVE:   ________ NEGATIVE ___N____    HEMATOLOGIC DISORDER:  POLYCYTHEMIA VERA ____N___ THROMBOCYTOSIS ___N___    DIC:___N____  TTP ___N_______    COLLAGEN VASCULAR DISEASE: _N____   DIAGNOSIS: ________________________.    VASCULITIS: TYPE AND LOCATION ____N___________ BIOPSY PROVEN:____________    CANCER DIAGNOSIS: __N_____    TYPE ________    CANCER SCREENING:  BREAST _______ COLON ___Y_____ LUNG ________ GENITOURINARY __Y_______   CT SCANS ___________ NORMAL ______ ABNORMAL ________    INDWELLING VASCULAR DEVICE:  ___N______. LOCATION _________    INFLAMMATORY BOWEL DISEASE: ULCERATIVE COLITIS ______N____   CHRON'S DISEASE ___________    RECENT COVID 19 INFECTION __N_____________.          LABORATORY:    FACTOR V LEIDEN MUTATION: POSITIVE   _____ NEGATIVE __Y___ HETEROZYGOUS ________ HOMOZYGOUS ________    PROTHROMBIN GENE MUTATION:  POSITIVE _____ NEGATIVE __Y___    ANTITHROMBIN III : NORMAL _____   ABNORMAL __Y____ QUANTIFICATION: __66_____    PROTEIN S ANTIGEN ____N______ PROTEIN S FUNCTIONAL _____N___ PROTEIN C ___N______     LUPUS ANTICOAGULANT: POSITIVE _____  NEGATIVE _N____. REPEATED  6 WEEKS LATER ______    ANTICARDIOLIPIN ANTIBODY PROFILE : POSITIVE ___Y___  NEGATIVE ______  IGG __N___ IGM _34____ REPEATED 6 WEEKS LATER _________    ANTI GLYCOPROTEIN ANTIBODY:  POSITIVE _____  NEGATIVE  __N_____   IGG _____ IGM _____ REPEATED 6 WEEKS LATER ________    ANTIPHOSPATIDIL SERINE ANTIBODY:  POSITIVE _____ NEGATIVE __N_____    SERUM PROTEIN ELECTROPHORESIS AND IMMUNOFIXATION: NO MONOCLONAL PROTEIN ___N_____ POSITIVE MONOCLONAL PROTEIN  ______ TYPE _________    C REACTIVE PROTEIN HIGH SENSIBILITY: NORMAL _______ ABNORMAL :QUANTIFICATION ________    SEDIMENTATION RATE: _____ MM/H.    FIBRINOGEN LEVEL: _________ NORMAL __Y_____  ABNORMAL __________.    LIPID PROFILE:    CHOLESTEROL HIGH _____N_____  TRYGLYCERIDES HIGH  ___N_____    HEPARIN ASSOCIATED ANTIPLATELET ANTIBODY: __NA_______          Past Medical History:   Diagnosis Date   • DVT (deep venous thrombosis) ([facility])     RLE in the distal femoral, popliteal, posterior tibial, peroneal, and soleal.   • Pulmonary emboli ([facility])         Past Surgical History:   Procedure Laterality Date   • APPENDECTOMY     • CARDIAC CATHETERIZATION N/A 9/18/2021    Procedure: Percutaneous Manual Thrombectomy- INARI;  Surgeon: Enrike Cantu MD;  Location: [facility] CATH INVASIVE LOCATION;  Service: Cardiovascular;  Laterality: N/A;   • CARDIAC CATHETERIZATION N/A 9/18/2021    Procedure: Right Heart Cath;  Surgeon: Enrike Cantu MD;  Location:  [facility] CATH INVASIVE LOCATION;  Service: Cardiovascular;  Laterality: N/A;   • COLONOSCOPY      negative normal   • EMBOLECTOMY     • INTERVENTIONAL RADIOLOGY PROCEDURE Bilateral 9/18/2021    Procedure: Pumonary angiography -Bilateral;  Surgeon: Enrike Cantu MD;  Location:  [facility] CATH INVASIVE LOCATION;  Service: Cardiovascular;  Laterality: Bilateral;   • TONSILLECTOMY          Current Outpatient Medications on File Prior to Visit   Medication Sig Dispense Refill   • Apixaban Starter Pack tablet therapy pack Take two 5 mg tablets by mouth every 12 hours for 7 days. Followed by one 5 mg tablet every 12 hours. (Dispense starter pack if available) 74 tablet 0   • escitalopram (LEXAPRO) 20 MG tablet Take 20 mg by mouth Daily.     • loratadine (Claritin) 10 MG tablet Take 10 mg by mouth Daily.     • Triamcinolone Acetonide (NASACORT) 55 MCG/ACT nasal inhaler 2 sprays into the nostril(s) as directed by provider Daily.       No current facility-administered medications on file  "prior to visit.        ALLERGIES:    Allergies   Allergen Reactions   • Penicillins Rash        Social History     Socioeconomic History   • Marital status:      Spouse name: Marimar   • Number of children: Not on file   • Years of education: Not on file   • Highest education level: Not on file   Tobacco Use   • Smoking status: Never Smoker   • Smokeless tobacco: Never Used   Vaping Use   • Vaping Use: Never used   Substance and Sexual Activity   • Alcohol use: Yes     Alcohol/week: 6.0 standard drinks     Types: 6 Cans of beer per week     Comment: caffine: coffee daily    • Drug use: Never   • Sexual activity: Defer        Family History   Problem Relation Age of Onset   • Hypertension Mother    • Hypertension Father    • Suicidality Brother    • Autism Son    • Hypertension Brother           Objective     Vitals:    10/04/21 1530   BP: 123/83   Pulse: 59   Resp: 14   Temp: 98.2 °F (36.8 °C)   TempSrc: Temporal   SpO2: 97%   Weight: 97 kg (213 lb 14.4 oz)   Height: 175.3 cm (69\")   PainSc: 0-No pain     Current Status 10/4/2021   ECOG score 0       Physical Exam  I HAVE PERSONALLY REVIEWED THE HISTORY OF THE PRESENT ILLNESS, PAST MEDICAL HISTORY, FAMILY HISTORY, SOCIAL HISTORY, ALLERGIES, MEDICATIONS STATED ABOVE IN THE  NOTE FROM TODAY.        GENERAL:  Well-developed, well-nourished  Patient  in no acute distress.   SKIN:  Warm, dry ,NO rashes,NO purpura ,NO petechiae.  HEENT:  Pupils were equal and reactive to light and accomodation, conjunctivae noninjected, no pterygium, normal extraocular movements, normal visual acuity.   NECK:  Supple with good range of motion; no thyromegaly , no other masses, no JVD or bruits, no cervical adenopathies.No carotid artery pain, no carotid abnormal pulsation , NO arterial dance.  LYMPHATICS:  No cervical, NO supraclavicular, NO axillary,NO epitrochlear , NO inguinal adenopathy.  CARDIAC   normal rate and regular rhythm, without murmur,NO rubs NO S3 NO S4 right or left " .  LUNGS: normal breath sounds bilateral, no wheezing, rhonchi, crackles or rubs.  VASCULAR VENOUS: no cyanosis, collateral circulation, varicosities,GRADE 1 RLE edema,NO palpable cords, pain, erythema.  ABDOMEN:  Soft, nontender with no hepatomegaly, no splenomegaly,no masses, no ascites, no collateral circulation,no distention,no [place] sign.  EXTREMITIES  AND SPINE:  No clubbing, cyanosis  no deformities , no pain .No kyphosis, scoliosis, no other deformities, no pain in spine, no pain in ribs , no pain inpelvic bone.  NEUROLOGICAL:  Patient was awake, alert, oriented to time, person and place.        RECENT LABS:  Hematology WBC   Date Value Ref Range Status   10/04/2021 10.17 3.40 - 10.80 10*3/mm3 Final     RBC   Date Value Ref Range Status   10/04/2021 4.92 4.14 - 5.80 10*6/mm3 Final     Hemoglobin   Date Value Ref Range Status   10/04/2021 14.8 13.0 - 17.7 g/dL Final     Hematocrit   Date Value Ref Range Status   10/04/2021 44.8 37.5 - 51.0 % Final     Platelets   Date Value Ref Range Status   10/04/2021 199 140 - 450 10*3/mm3 Final       CBC:    WBC   Date Value Ref Range Status   10/04/2021 10.17 3.40 - 10.80 10*3/mm3 Final     RBC   Date Value Ref Range Status   10/04/2021 4.92 4.14 - 5.80 10*6/mm3 Final     Hemoglobin   Date Value Ref Range Status   10/04/2021 14.8 13.0 - 17.7 g/dL Final     Hematocrit   Date Value Ref Range Status   10/04/2021 44.8 37.5 - 51.0 % Final     MCV   Date Value Ref Range Status   10/04/2021 91.1 79.0 - 97.0 fL Final     MCH   Date Value Ref Range Status   10/04/2021 30.1 26.6 - 33.0 pg Final     MCHC   Date Value Ref Range Status   10/04/2021 33.0 31.5 - 35.7 g/dL Final     RDW   Date Value Ref Range Status   10/04/2021 12.0 (L) 12.3 - 15.4 % Final     RDW-SD   Date Value Ref Range Status   10/04/2021 40.1 37.0 - 54.0 fl Final     MPV   Date Value Ref Range Status   10/04/2021 9.1 6.0 - 12.0 fL Final     Platelets   Date Value Ref Range Status   10/04/2021 199 140 - 450  10*3/mm3 Final     Neutrophil %   Date Value Ref Range Status   10/04/2021 73.8 42.7 - 76.0 % Final     Lymphocyte %   Date Value Ref Range Status   10/04/2021 16.6 (L) 19.6 - 45.3 % Final     Monocyte %   Date Value Ref Range Status   10/04/2021 7.6 5.0 - 12.0 % Final     Eosinophil %   Date Value Ref Range Status   10/04/2021 1.4 0.3 - 6.2 % Final     Basophil %   Date Value Ref Range Status   10/04/2021 0.4 0.0 - 1.5 % Final     Immature Grans %   Date Value Ref Range Status   10/04/2021 0.2 0.0 - 0.5 % Final     Neutrophils, Absolute   Date Value Ref Range Status   10/04/2021 7.51 (H) 1.70 - 7.00 10*3/mm3 Final     Lymphocytes, Absolute   Date Value Ref Range Status   10/04/2021 1.69 0.70 - 3.10 10*3/mm3 Final     Monocytes, Absolute   Date Value Ref Range Status   10/04/2021 0.77 0.10 - 0.90 10*3/mm3 Final     Eosinophils, Absolute   Date Value Ref Range Status   10/04/2021 0.14 0.00 - 0.40 10*3/mm3 Final     Basophils, Absolute   Date Value Ref Range Status   10/04/2021 0.04 0.00 - 0.20 10*3/mm3 Final     Immature Grans, Absolute   Date Value Ref Range Status   10/04/2021 0.02 0.00 - 0.05 10*3/mm3 Final     nRBC   Date Value Ref Range Status   10/04/2021 0.0 0.0 - 0.2 /100 WBC Final        CMP:    Glucose   Date Value Ref Range Status   10/04/2021 101 74 - 124 mg/dL Final     BUN   Date Value Ref Range Status   10/04/2021 26 (H) 6 - 20 mg/dL Final     Creatinine   Date Value Ref Range Status   10/04/2021 1.13 0.70 - 1.30 mg/dL Final     Sodium   Date Value Ref Range Status   10/04/2021 139 134 - 145 mmol/L Final     Potassium   Date Value Ref Range Status   10/04/2021 4.8 (H) 3.5 - 4.7 mmol/L Final     Chloride   Date Value Ref Range Status   10/04/2021 103 98 - 107 mmol/L Final     CO2   Date Value Ref Range Status   10/04/2021 24.7 22.0 - 29.0 mmol/L Final     Calcium   Date Value Ref Range Status   10/04/2021 9.5 8.5 - 10.2 mg/dL Final     Total Protein   Date Value Ref Range Status   10/04/2021 7.4 6.3 -  8.0 g/dL Final     Albumin   Date Value Ref Range Status   10/04/2021 4.50 3.50 - 5.20 g/dL Final     ALT (SGPT)   Date Value Ref Range Status   10/04/2021 24 0 - 41 U/L Final     AST (SGOT)   Date Value Ref Range Status   10/04/2021 37 0 - 40 U/L Final     Alkaline Phosphatase   Date Value Ref Range Status   10/04/2021 100 38 - 116 U/L Final     Total Bilirubin   Date Value Ref Range Status   10/04/2021 0.5 0.2 - 1.2 mg/dL Final     Globulin   Date Value Ref Range Status   10/04/2021 2.9 1.8 - 3.5 gm/dL Final     A/G Ratio   Date Value Ref Range Status   10/04/2021 1.6 1.1 - 2.4 g/dL Final     BUN/Creatinine Ratio   Date Value Ref Range Status   10/04/2021 23.0 7.3 - 30.0 Final     Anion Gap   Date Value Ref Range Status   10/04/2021 11.3 5.0 - 15.0 mmol/L Final           Recent imaging:    Adult Transthoracic Echo Complete W/ Cont if Necessary Per Protocol    Result Date: 9/17/2021  Narrative: · Left ventricular wall thickness is consistent with mild concentric hypertrophy. · Estimated left ventricular EF = 70% Estimated left ventricular EF was in agreement with the calculated left ventricular EF. Left ventricular systolic function is normal. · The right ventricular cavity is severely dilated. · Moderately reduced right ventricular systolic function noted. · Estimated right ventricular systolic pressure from tricuspid regurgitation is markedly elevated (>55 mmHg). · Moderate to severe pulmonary hypertension is present. · The right atrial cavity is mild to moderately dilated. · RV contractile morphology consistent with a positive Alfaro sign      Cardiac Catheterization/Vascular Study, Invasive peripheral vascular study    Result Date: 9/18/2021  Narrative: CARDIAC CATHETERIZATION REPORT Procedure: Right heart catheterization, pulmonary angiogram bilaterally selective, bilateral pulmonary thrombectomy right and left pulmonary artery DATE OF PROCEDURE: 09/18/21 PROCEDURE PERFORMED BY: Enrike Cantu MD, [facility]  INDICATION FOR PROCEDURE: Submassive pulmonary emboli with continued hypoxemia and evidence of right heart dilatation and strain DESCRIPTION OF PROCEDURE: After consent was obtained, using a micropuncture technique and ultrasound guidance placed an 7 Fr sheath was placed in the right common femoral vein.  I then deployed a preclose suture in the right common femoral vein.  I then positioned a core wire into the superior vena cava from the right common femoral vein.  Then with serial dilatation with a 12 and 16 Welsh dilator then placed a 24 Welsh dry seal sheath all the way up into the inferior vena cava under fluoroscopic guidance.  We gave a total of 10,000 units of heparin to achieve an ACT of 230 seconds.  I then advanced a 7 Welsh balloon flotation catheter (BFC) and performed right heart catheterization with measurement of the pressures in the right atrium, right ventricle and pulmonary artery.  The flotation catheter went into the right pulmonary artery and I positioned a Glidewire advantage into the inferior branch of the right pulmonary artery and exchanged out the BFC and brought in a Grollman injection catheter to the right pulmonary artery.  Pulmonary angiography was then performed.  Following that we brought an Amplatz extra-support wire up and positioned that into the inferior branch of the right pulmonary artery.   I advanced the Aspiration Guide Catheter (AGC) up into the right pulmonary artery.  We then did aspiration thrombectomy x4 with a tremendous visual removal of thrombus.   We did aspiration until there was no further removal of embolus.  We did an angiogram through the AGC catheter and it revealed essentially all of the embolus had been removed.  I then exchanged out the AGC and brought the BFC up and floated it into the left pulmonary artery.  A at that point we then brought the Glidewire advantage up into the left lung and I then placed the Grollman catheter in the left lung and  pulmonary angiography was performed.  We then used a Glidewire advantage to get into the inferior segment of the lung and we exchanged out and position the AGC into the left lung over an Amplatz extra-support wire. Aspiration thrombectomy of the left lung was performed using the AGC x2.  We continue to aspirate until no further embolus was returned.  I then did an angiogram through the AGC catheter and it also removed that the vast majority of the embolus have been removed.  The BFC was repositioned into the pulmonary artery and repeat pulmonary pressures and pulmonary arterial saturation were obtained.  At that point we felt we had accomplished everything we could with this and we removed the catheter.  We then removed the 24 Macedonian dry seal sheath and deployed the preclosed suture with great results and hemostasis was obtained.  The patient tolerated the procedure well without early complication.  FINDINGS: Right heart cath   Pre   Post    Pressure RA  6   6 RV  52/14   35/9 PA  52/15 mean 25  35/8 mean 18 PA sat  70   75 Pulmonary angiography: Right pulmonary artery: Large amount of central embolus involving all segments of the right pulmonary artery, delayed perfusion through the entire right lung except for the very superior portion Post angiogram no embolus and normal perfusion throughout the right lung Left pulmonary artery: Large central embolus mainly in the mid and inferior segment of the left pulmonary artery with reduced perfusion Post angiogram no embolus and normal perfusion throughout the left lung Summary: Highly successful pulmonary embolectomy bilaterally.  Will hold the heparin for 4 hours and then start Eliquis tonight when ask the hematologist to see him for a hypercoagulable evaluation.  Hopefully he may even be able to go home tomorrow.      CT Chest Pulmonary Embolism    Result Date: 9/17/2021  Narrative:  DATE OF EXAM: 9/17/2021 2:00 PM  PROCEDURE: CT CHEST PULMONARY EMBOLISM-  INDICATIONS:  pain  COMPARISON: No Comparisons Available  TECHNIQUE: Routine transaxial slices were obtained through chest after administration of intravenous 100 ml of Isovue 370. Reconstructed coronal and sagittal images were also obtained. Automated exposure control and iterative reconstruction methods were used.  FINDINGS: The lungs appear clear. There is no mediastinal or hilar lymphadenopathy. There are large bilateral pulmonary emboli present. On the right, there is a large embolus filling the distal right main pulmonary artery and extending into the upper and lower lobe pulmonary arteries. On the left, there is a large embolus at the bifurcation into the upper and lower pole renal arteries with a large amount of post filling the proximal lower lobe pulmonary artery. The upper abdomen appears normal is mild right heart dilatation suggesting right heart strain.      Impression: Extensive bilateral pulmonary emboli with evidence of right heart strain.  Electronically Signed By-Dillan Terrell MD On:9/17/2021 2:35 PM This report was finalized on [id number] by  Dillan Terrell MD.    Duplex Venous Lower Extremity - Bilateral CAR    Result Date: 9/19/2021  Narrative: · Acute right lower extremity deep vein thrombosis noted in the distal femoral, popliteal, posterior tibial, peroneal and soleal. · All other veins appeared normal bilaterally.         Assessment/Plan     Diagnoses and all orders for this visit:    1. Acute deep vein thrombosis (DVT) of distal vein of right lower extremity (HCC) (Primary)  -     CBC & Differential; Future  -     Comprehensive Metabolic Panel; Future  -     Anticardiolipin Antibody, IgG / M, Qn; Future  -     Antithrombin III; Future  -     Duplex Venous Lower Extremity - Right CAR; Future  -     CT Angiogram Chest With Contrast; Future    2. Acute saddle pulmonary embolism with acute cor pulmonale (HCC)  -     CBC & Differential; Future  -     Comprehensive Metabolic Panel; Future  -      Anticardiolipin Antibody, IgG / M, Qn; Future  -     Antithrombin III; Future  -     Duplex Venous Lower Extremity - Right CAR; Future  -     CT Angiogram Chest With Contrast; Future    3. Anticardiolipin antibody positive    4. Thrombocytopenia, acquired (HCC)         The patient was brought back to the office on 10/04/2021. Since the hospital stay he has been terrific at home. He has gained back his mobility and his ability to function with no limitations. He has had near complete resolution of the swelling and pain in the right calf and right lower extremity and he has not had any shortness of breath, palpitations, pleuritic pain, cough, hemoptysis. He occasional has the sensation of the heart that flip flops and lasts for 1 or 2 seconds in absence of any pain.     He has not had any clinical bleeding on the Eliquis.     His clinical examination today besides minimal residual swelling in the right lower extremity shows no other abnormalities and there is no evidence clinically of pulmonary hypertension.     The thrombophilia labs have documented the only abnormality, an anticardiolipin antibody IgM that was marginally positive. The rest of the studies as posted above in the hematological history were negative. This leads me to conclude that the most likely factor that triggered the clot in this patient was the positive anticardiolipin antibody but most importantly the long drive to [place], 20 hours in a car. The patient stopped on 2 occasions for gasoline and food and that was the rest of the story. He developed swelling on the trip in the right lower extremity and he kept having swelling and pain in the calf for many weeks after that having very likely frequent showers of emboli that filled up the pulmonary circulation and culminated with his syncopal episode while mowing grass.     The patient's clinical examination today otherwise is unrevealing. He feels great. He also has resolution of the thrombocytopenia  that he had in the hospital that is probably associated with excessive platelet consumption but massive amount of clots. The platelet count, hemoglobin and white count today are completely normal.     My recommendations for this patient are as follows:   1. He will remain on his Eliquis for the time being and I have recommended to the patient to remain on this medicine for the rest of his life. I feel afraid of this patient having another episode and I feel afraid of this patient having any other issues that would culminate with more complications or problems associated with this process.   2. I advised the patient to have a repeat CT angiogram of the chest and Doppler study of the right lower extremity in 5 weeks and to repeat the level of antithrombin III as well as the anticardiolipin antibody in 5 weeks. I would like to review him back in 6 weeks.   3. I advised him to use elastic support in the right lower extremity if possible all the time. He does not need to use this at nighttime.   4. I advised him against any potential for trauma to minimize bleeding associated with anticoagulant use.   5. I advised him to resume his beer drinking that he loves to do. I asked him that he needs to do this even with more than moderation. Maybe a beer here and there and he is not allowed to get tipsy. He is aware that too much alcohol and anticoagulants are not good friends from the point of view of falling, trauma and circumstances of that nature.     The patient raised the question about his sex life and I told him that I think he can resume this according to his circumstances with no significant limitations.     I discussed all these facts with the patient. I provided him copies of the laboratory testing and I discussed this with the patient’s wife.     They raised the question if I do believe that the COVID vaccination had something to do with this. He received this a few weeks before the trip to [place]. Maybe that was  a triggering factor for anticardiolipin antibodies. I am not sure about the answer that I need to do in this regard. He received Pfizer vaccination. I advised him though in the home run to avoid the use of a 2nd dose on himself at this point until we clarify and remove more smoke out of the air.

## 2021-10-10 ENCOUNTER — TELEPHONE (OUTPATIENT)
Dept: CARDIOLOGY | Facility: CLINIC | Age: 61
End: 2021-10-10

## 2021-10-10 NOTE — TELEPHONE ENCOUNTER
"Mr. Donald Gupta called with concerns for some mild labored breathing with exertion and associated lightheadedness. He reported that he didn't feel \"short of breath\", just that he felt like he occasionally felted like he was having difficulty taking a deep breath. This is relieved when rests, sitting with his feet up. He was not sure if it's anxiety causing him to feel short of breath or if he overexerted himself walking around [place] yesterday.  At the time of the call, he did not feel short of breath or lightheaded.   He mentioned that he had just seen Dr. Washington in the office and was reassured he was doing well and Dr. Cantu got most of his PE clot burden and his Eliquis would help break down the remaining clot in his leg.     I spoke with him about his options of coming to the ER or monitoring at home and calling back with any worsening. He decided that he wanted to monitor at home and not exert himself today and will go to the ER if worse/ call office in morning if concern unchanged.     "

## 2021-10-11 ENCOUNTER — TELEPHONE (OUTPATIENT)
Dept: CARDIOLOGY | Facility: CLINIC | Age: 61
End: 2021-10-11

## 2021-10-11 NOTE — TELEPHONE ENCOUNTER
Patient called today about him his SOA. Patient states he is easily winded while talking and walking up the stairs, shallow breathing. Patient states he began feeling unwell yesterday morning, he exercised on Friday doing cardio and stair master for 1/2 hour.    10/11/21: /77  HR: 58  Swelling in his right ankle is the same  Water intake: Patient states he has drank today half of 3 quarts of a gallon   Lightlessness

## 2021-10-11 NOTE — TELEPHONE ENCOUNTER
I spoke with the patient and got him scheduled for tomorrow with Natalie MILLS at 1430.    He states that at this time he is doing much better and his SOA is relieved.    Thank you,  Nunu Larkin RN  [facility]  Triage

## 2021-10-11 NOTE — TELEPHONE ENCOUNTER
I spoke with Mr. Gupta and his wife. Apparently, Dr. Washington approved that he return to his regular activities. He states that Friday and Saturday he really stepped up his cardio and states that he felt fantastic. Patient is a beer drinker and was given the ok by Dr. Washington to resume this as well.     On Saturday after the workout and a lot of walking at a [place], once home, he noticed that he was breathing really shallow. He doesn't describe this as SOA but instead notes that he was aware of his breathing at rest and it felt shallow and like he couldn't get a deep breath. This lasted about 2 hours and hasn't returned.    He was also having some lightheadedness with standing. His BP was 11/72 hr 57 and his wife states that those are both a little elevated for him.     He continues to have some swelling in his right calf and started wearing compression stockings. He denies any pain or redness. He denies any CP, or other complaints.    We discussed his BP and possibly being dehydrated which could cause the lightheadedness.  He agrees he could have been because of the work out and the heat of the day. We discussed the reason for taking eliquis and DVT/PE recovery times and symptoms and they verbalized understanding.    He has a follow up with Dr. Cantu on 10/22.  Please advise.    Thank you,  Nunu Larkin RN  [facility]  Triage

## 2021-10-11 NOTE — TELEPHONE ENCOUNTER
Attempted to contact patient again with this update. Voicemail left requesting return call. I did call the wife as well but she states that since I spoke to Mr. Gupta, his symptoms have returned and worsened.    Thank you,  Nunu Larkin RN  [facility]  Triage

## 2021-10-12 ENCOUNTER — OFFICE VISIT (OUTPATIENT)
Dept: CARDIOLOGY | Facility: CLINIC | Age: 61
End: 2021-10-12

## 2021-10-12 VITALS
HEART RATE: 57 BPM | WEIGHT: 214 LBS | BODY MASS INDEX: 31.7 KG/M2 | SYSTOLIC BLOOD PRESSURE: 110 MMHG | HEIGHT: 69 IN | DIASTOLIC BLOOD PRESSURE: 88 MMHG

## 2021-10-12 DIAGNOSIS — I26.02 ACUTE SADDLE PULMONARY EMBOLISM WITH ACUTE COR PULMONALE (HCC): ICD-10-CM

## 2021-10-12 DIAGNOSIS — I51.7 RIGHT VENTRICULAR DILATION: Primary | ICD-10-CM

## 2021-10-12 DIAGNOSIS — I82.4Z1 ACUTE DEEP VEIN THROMBOSIS (DVT) OF DISTAL VEIN OF RIGHT LOWER EXTREMITY (HCC): ICD-10-CM

## 2021-10-12 PROCEDURE — 93000 ELECTROCARDIOGRAM COMPLETE: CPT | Performed by: NURSE PRACTITIONER

## 2021-10-12 PROCEDURE — 99214 OFFICE O/P EST MOD 30 MIN: CPT | Performed by: NURSE PRACTITIONER

## 2021-10-12 RX ORDER — APIXABAN 5 MG (74)
KIT ORAL
Qty: 74 TABLET | Refills: 0 | OUTPATIENT
Start: 2021-10-12

## 2021-10-12 NOTE — PROGRESS NOTES
Date of Office Visit: 10/12/21  Encounter Provider: LINA Corey  Place of Service: [facility] CARDIOLOGY  Patient Name: Christopher Gupta  :1960    Chief Complaint   Patient presents with   • Acute saddle pulmonary embolism with acute cor pulmonale   • Shortness of Breath   • Dizziness   • Follow-up   :     HPI: Christopher Gupta is a 60 y.o. male  with history of acute saddle pulmonary embolism status post thrombectomy, anxiety and seasonal allergies. He is followed by Dr. Cantu. I will visit with him for the first time today and have reviewed his medical record. He drove 17 hours to [place] to drop his daughter's car off to her. She recently started graduate school there. He had some progressive shortness of breath for approximately 2 to 3 weeks after he returned. He presented with shortness of breath, oxygen saturation 90%, normal sinus rhythm, D-dimer greater than 10.0 and RV dilation on echocardiogram with RVSP measuring 77 mmHg. He had right heart catheterization and pulmonary angiogram bilaterally with thrombectomy only of the right and left pulmonary artery. He was also noted to have acute right lower extremity DVT. Hematology was consulted for further evaluation of underlying blood disorder. He transition from heparin IV to Eliquis. He now presents for reassessment. Over the weekend he did cardio on Friday and Saturday. He went to harvest home coming Saturday    And was not well hydrated. He has a son who has autism who really wanted to go to the [facility]. He had lightheadedness and shortness of breath. He had some cramping in his right calf. He is very nervous and is only taking symptoms lightly. He denies chest pain or edema. He has no shortness of breath today and no lightheadedness.          Allergies   Allergen Reactions   • Penicillins Rash           Family and social history reviewed.     ROS  All other systems were reviewed and are negative          Objective:  "    Vitals:    10/12/21 1510   BP: 110/88   BP Location: Left arm   Patient Position: Sitting   Pulse: 57   Weight: 97.1 kg (214 lb)   Height: 175.3 cm (69\")     Body mass index is 31.6 kg/m².    PHYSICAL EXAM:  Constitutional:       General: Not in acute distress.     Appearance: Well-developed. Not diaphoretic.   HENT:      Head: Normocephalic.   Pulmonary:      Effort: Pulmonary effort is normal. No respiratory distress.      Breath sounds: Normal breath sounds. No wheezing. No rhonchi. No rales.   Cardiovascular:      Normal rate. Regular rhythm.   Pulses:     Radial: 2+ bilaterally.  Skin:     General: Skin is warm and dry. There is no cyanosis.      Findings: No rash.   Neurological:      Mental Status: Alert and oriented to person, place, and time.   Psychiatric:         Behavior: Behavior normal.         Thought Content: Thought content normal.         Judgment: Judgment normal.           ECG 12 Lead    Date/Time: 10/12/2021 5:05 PM  Performed by: Radha Szymanski APRN  Authorized by: Radha Szymanski APRN   Comparison: compared with previous ECG   Similar to previous ECG  Rhythm: sinus rhythm  Rate: normal  QRS axis: normal                Current Outpatient Medications   Medication Sig Dispense Refill   • apixaban (ELIQUIS) 5 MG tablet tablet Take 1 tablet by mouth Every 12 (Twelve) Hours. 60 tablet 11   • escitalopram (LEXAPRO) 20 MG tablet Take 20 mg by mouth Daily.     • loratadine (Claritin) 10 MG tablet Take 10 mg by mouth Daily.     • Triamcinolone Acetonide (NASACORT) 55 MCG/ACT nasal inhaler 2 sprays into the nostril(s) as directed by provider Daily.       No current facility-administered medications for this visit.     Assessment:       Diagnosis Plan   1. Right ventricular dilation  Adult Transthoracic Echo Complete W/ Cont if Necessary Per Protocol   2. Acute deep vein thrombosis (DVT) of distal vein of right lower extremity (HCC)          Orders Placed This Encounter   Procedures   • ECG 12 Lead     " This order was created via procedure documentation     Order Specific Question:   Release to patient     Answer:   Immediate   • Adult Transthoracic Echo Complete W/ Cont if Necessary Per Protocol     Standing Status:   Future     Standing Expiration Date:   10/12/2022     Order Specific Question:   Reason for exam?     Answer:   Dyspnea         Plan:    1. 60-year-old gentleman with bilateral saddle September 2021 status post bilateral thrombectomy maintained on Eliquis-his have a CTA  11/11/2021 to follow up per Dr. Washington  -Seem to be provoked aggravated by long car ride prior but undergoing hematology evaluation see below  2.  RV dilation-have ordered an echo to be arranged for next week to reassess  3.  Pulmonary hypertension RVSP measured 77 mmHg on echo in the setting of acute bilateral pulmonary embolism-we will reassess that on repeat echo as listed above  4.  History of anxiety  5.  History of bradycardia-he is historically been asymptomatic.  He is a very active gentleman so this poses no concern at this time  6.  Possible hypercoagulable state-he is under pending evaluation with Dr. Washington who plans to repeat the level of antithrombin III as well as the anticardiolipin   7.  Right lower extremity DVT-has not missed any doses of Eliquis.  He is to have a follow-up lower extremity venous duplex 1/11/2021 as scheduled        Discussed aggressive testing such as stat venous lower extremity duplex and stat CTA chest today however patient has not missed any doses of Eliquis and understands there is low risk of having a new clot development.  He has no lightheadedness or shortness of breath so far today.  I advised that he increase his water intake and stay active routinely.  He was reassured after our discussion and is to call me if he has any further issues between now and his follow-up echo next week.            It has been a pleasure to participate in this patient's care.      Thank you,  Radha Szymanski ,  APRN      **I used Dragon to dictate this note:**

## 2021-10-18 ENCOUNTER — HOSPITAL ENCOUNTER (OUTPATIENT)
Dept: CARDIOLOGY | Facility: HOSPITAL | Age: 61
Discharge: HOME | End: 2021-10-18
Admitting: NURSE PRACTITIONER

## 2021-10-18 VITALS
DIASTOLIC BLOOD PRESSURE: 88 MMHG | HEART RATE: 68 BPM | WEIGHT: 214 LBS | SYSTOLIC BLOOD PRESSURE: 110 MMHG | BODY MASS INDEX: 31.7 KG/M2 | HEIGHT: 69 IN

## 2021-10-18 DIAGNOSIS — I51.7 RIGHT VENTRICULAR DILATION: ICD-10-CM

## 2021-10-18 PROCEDURE — 93306 TTE W/DOPPLER COMPLETE: CPT

## 2021-10-18 PROCEDURE — 93306 TTE W/DOPPLER COMPLETE: CPT | Performed by: INTERNAL MEDICINE

## 2021-10-19 ENCOUNTER — TELEPHONE (OUTPATIENT)
Dept: CARDIOLOGY | Facility: CLINIC | Age: 61
End: 2021-10-19

## 2021-10-19 LAB
AORTIC ARCH: 1.8 CM
BH CV ECHO MEAS - ACS: 2.5 CM
BH CV ECHO MEAS - AO MAX PG (FULL): 2 MMHG
BH CV ECHO MEAS - AO MAX PG: 6 MMHG
BH CV ECHO MEAS - AO MEAN PG (FULL): 1 MMHG
BH CV ECHO MEAS - AO MEAN PG: 3 MMHG
BH CV ECHO MEAS - AO ROOT AREA (BSA CORRECTED): 1.8
BH CV ECHO MEAS - AO ROOT AREA: 11.3 CM^2
BH CV ECHO MEAS - AO ROOT DIAM: 3.8 CM
BH CV ECHO MEAS - AO V2 MAX: 122 CM/SEC
BH CV ECHO MEAS - AO V2 MEAN: 78.5 CM/SEC
BH CV ECHO MEAS - AO V2 VTI: 23.6 CM
BH CV ECHO MEAS - AVA(I,A): 4.2 CM^2
BH CV ECHO MEAS - AVA(I,D): 4.2 CM^2
BH CV ECHO MEAS - AVA(V,A): 3.4 CM^2
BH CV ECHO MEAS - AVA(V,D): 3.4 CM^2
BH CV ECHO MEAS - BSA(HAYCOCK): 2.2 M^2
BH CV ECHO MEAS - BSA: 2.1 M^2
BH CV ECHO MEAS - BZI_BMI: 31.6 KILOGRAMS/M^2
BH CV ECHO MEAS - BZI_METRIC_HEIGHT: 175.3 CM
BH CV ECHO MEAS - BZI_METRIC_WEIGHT: 97.1 KG
BH CV ECHO MEAS - EDV(CUBED): 185.2 ML
BH CV ECHO MEAS - EDV(MOD-SP2): 67 ML
BH CV ECHO MEAS - EDV(MOD-SP4): 67 ML
BH CV ECHO MEAS - EDV(TEICH): 160 ML
BH CV ECHO MEAS - EF(CUBED): 83.9 %
BH CV ECHO MEAS - EF(MOD-BP): 63.8 %
BH CV ECHO MEAS - EF(MOD-SP2): 62.7 %
BH CV ECHO MEAS - EF(MOD-SP4): 67.2 %
BH CV ECHO MEAS - EF(TEICH): 76.3 %
BH CV ECHO MEAS - ESV(CUBED): 29.8 ML
BH CV ECHO MEAS - ESV(MOD-SP2): 25 ML
BH CV ECHO MEAS - ESV(MOD-SP4): 22 ML
BH CV ECHO MEAS - ESV(TEICH): 37.9 ML
BH CV ECHO MEAS - FS: 45.6 %
BH CV ECHO MEAS - IVS/LVPW: 1
BH CV ECHO MEAS - IVSD: 1 CM
BH CV ECHO MEAS - LAT PEAK E' VEL: 8.2 CM/SEC
BH CV ECHO MEAS - LV DIASTOLIC VOL/BSA (35-75): 31.5 ML/M^2
BH CV ECHO MEAS - LV MASS(C)D: 226.4 GRAMS
BH CV ECHO MEAS - LV MASS(C)DI: 106.5 GRAMS/M^2
BH CV ECHO MEAS - LV MAX PG: 4 MMHG
BH CV ECHO MEAS - LV MEAN PG: 2 MMHG
BH CV ECHO MEAS - LV SYSTOLIC VOL/BSA (12-30): 10.3 ML/M^2
BH CV ECHO MEAS - LV V1 MAX: 100 CM/SEC
BH CV ECHO MEAS - LV V1 MEAN: 62.8 CM/SEC
BH CV ECHO MEAS - LV V1 VTI: 24 CM
BH CV ECHO MEAS - LVIDD: 5.7 CM
BH CV ECHO MEAS - LVIDS: 3.1 CM
BH CV ECHO MEAS - LVLD AP2: 8.3 CM
BH CV ECHO MEAS - LVLD AP4: 8.4 CM
BH CV ECHO MEAS - LVLS AP2: 6.7 CM
BH CV ECHO MEAS - LVLS AP4: 7.2 CM
BH CV ECHO MEAS - LVOT AREA (M): 4.2 CM^2
BH CV ECHO MEAS - LVOT AREA: 4.2 CM^2
BH CV ECHO MEAS - LVOT DIAM: 2.3 CM
BH CV ECHO MEAS - LVPWD: 1 CM
BH CV ECHO MEAS - MED PEAK E' VEL: 8.6 CM/SEC
BH CV ECHO MEAS - MV A DUR: 0.1 SEC
BH CV ECHO MEAS - MV A MAX VEL: 46.3 CM/SEC
BH CV ECHO MEAS - MV DEC SLOPE: 201 CM/SEC^2
BH CV ECHO MEAS - MV DEC TIME: 0.23 SEC
BH CV ECHO MEAS - MV E MAX VEL: 38.6 CM/SEC
BH CV ECHO MEAS - MV E/A: 0.83
BH CV ECHO MEAS - MV P1/2T MAX VEL: 52.7 CM/SEC
BH CV ECHO MEAS - MV P1/2T: 76.8 MSEC
BH CV ECHO MEAS - MVA P1/2T LCG: 4.2 CM^2
BH CV ECHO MEAS - MVA(P1/2T): 2.9 CM^2
BH CV ECHO MEAS - PA MAX PG (FULL): 1.2 MMHG
BH CV ECHO MEAS - PA MAX PG: 2.5 MMHG
BH CV ECHO MEAS - PA V2 MAX: 78.7 CM/SEC
BH CV ECHO MEAS - PULM A REVS DUR: 0.13 SEC
BH CV ECHO MEAS - PULM A REVS VEL: 40.3 CM/SEC
BH CV ECHO MEAS - PULM DIAS VEL: 31.7 CM/SEC
BH CV ECHO MEAS - PULM S/D: 1.7
BH CV ECHO MEAS - PULM SYS VEL: 53.1 CM/SEC
BH CV ECHO MEAS - PVA(V,A): 1.9 CM^2
BH CV ECHO MEAS - PVA(V,D): 1.9 CM^2
BH CV ECHO MEAS - QP/QS: 0.32
BH CV ECHO MEAS - RAP SYSTOLE: 3 MMHG
BH CV ECHO MEAS - RV MAX PG: 1.3 MMHG
BH CV ECHO MEAS - RV MEAN PG: 1 MMHG
BH CV ECHO MEAS - RV V1 MAX: 57.4 CM/SEC
BH CV ECHO MEAS - RV V1 MEAN: 37.9 CM/SEC
BH CV ECHO MEAS - RV V1 VTI: 12.5 CM
BH CV ECHO MEAS - RVOT AREA: 2.5 CM^2
BH CV ECHO MEAS - RVOT DIAM: 1.8 CM
BH CV ECHO MEAS - RVSP: 11 MMHG
BH CV ECHO MEAS - SI(AO): 125.9 ML/M^2
BH CV ECHO MEAS - SI(CUBED): 73.1 ML/M^2
BH CV ECHO MEAS - SI(LVOT): 46.9 ML/M^2
BH CV ECHO MEAS - SI(MOD-SP2): 19.8 ML/M^2
BH CV ECHO MEAS - SI(MOD-SP4): 21.2 ML/M^2
BH CV ECHO MEAS - SI(TEICH): 57.4 ML/M^2
BH CV ECHO MEAS - SUP REN AO DIAM: 1.9 CM
BH CV ECHO MEAS - SV(AO): 267.7 ML
BH CV ECHO MEAS - SV(CUBED): 155.4 ML
BH CV ECHO MEAS - SV(LVOT): 99.7 ML
BH CV ECHO MEAS - SV(MOD-SP2): 42 ML
BH CV ECHO MEAS - SV(MOD-SP4): 45 ML
BH CV ECHO MEAS - SV(RVOT): 31.8 ML
BH CV ECHO MEAS - SV(TEICH): 122.1 ML
BH CV ECHO MEAS - TAPSE (>1.6): 2.8 CM
BH CV ECHO MEAS - TR MAX VEL: 139 CM/SEC
BH CV ECHO MEASUREMENTS AVERAGE E/E' RATIO: 4.6
BH CV XLRA - RV BASE: 3.8 CM
BH CV XLRA - RV LENGTH: 7.6 CM
BH CV XLRA - RV MID: 2.4 CM
LEFT ATRIUM VOLUME INDEX: 16 ML/M2
LV EF 2D ECHO EST: 64 %
MAXIMAL PREDICTED HEART RATE: 161 BPM
SINUS: 3.5 CM
STJ: 3.1 CM
STRESS TARGET HR: 137 BPM

## 2021-10-19 NOTE — TELEPHONE ENCOUNTER
S/w patient. Echo shows tremendous improvement. EF normal. RV normal size and function. RVSP now normalized. Needs to keep appt with Dr. Cantu this week

## 2021-10-22 ENCOUNTER — OFFICE VISIT (OUTPATIENT)
Dept: CARDIOLOGY | Facility: CLINIC | Age: 61
End: 2021-10-22

## 2021-10-22 VITALS
WEIGHT: 213 LBS | HEART RATE: 52 BPM | BODY MASS INDEX: 31.55 KG/M2 | SYSTOLIC BLOOD PRESSURE: 114 MMHG | HEIGHT: 69 IN | DIASTOLIC BLOOD PRESSURE: 74 MMHG

## 2021-10-22 DIAGNOSIS — I26.02 ACUTE SADDLE PULMONARY EMBOLISM WITH ACUTE COR PULMONALE (HCC): Primary | ICD-10-CM

## 2021-10-22 PROCEDURE — 93000 ELECTROCARDIOGRAM COMPLETE: CPT | Performed by: INTERNAL MEDICINE

## 2021-10-22 PROCEDURE — 99214 OFFICE O/P EST MOD 30 MIN: CPT | Performed by: INTERNAL MEDICINE

## 2021-10-22 NOTE — PROGRESS NOTES
Date of Office Visit: 10/22/21  Encounter Provider: Enrike Cantu MD  Place of Service: [facility] CARDIOLOGY  Patient Name: Christopher Gupta  :1960  [id number]    Chief Complaint   Patient presents with   • Pulmonary Embolism     BHE 1 month f/u   :     HPI: Christopher Gupta is a 60 y.o. male he came in with syncope and a submassive pulmonary embolus he has undergone successful and artery this was in 2020 and is really done very well since then no real symptoms since then no bleeding difficulty he is anticoagulated with Eliquis he did see hematology.  Turns out he was weakly positive for antiphospholipid antibody.  He has seen Dr. Velez who recommended lifelong anticoagulation although he asked me if he could stop it    Past Medical History:   Diagnosis Date   • DVT (deep venous thrombosis) (HCC)     RLE in the distal femoral, popliteal, posterior tibial, peroneal, and soleal.   • Pulmonary emboli (HCC)        Past Surgical History:   Procedure Laterality Date   • APPENDECTOMY     • CARDIAC CATHETERIZATION N/A 2021    Procedure: Percutaneous Manual Thrombectomy- INARI;  Surgeon: Enrike Cantu MD;  Location: [facility] CATH INVASIVE LOCATION;  Service: Cardiovascular;  Laterality: N/A;   • CARDIAC CATHETERIZATION N/A 2021    Procedure: Right Heart Cath;  Surgeon: Enrike Cantu MD;  Location: [facility] CATH INVASIVE LOCATION;  Service: Cardiovascular;  Laterality: N/A;   • COLONOSCOPY      negative normal   • EMBOLECTOMY     • INTERVENTIONAL RADIOLOGY PROCEDURE Bilateral 2021    Procedure: Pumonary angiography -Bilateral;  Surgeon: Enrike Cantu MD;  Location: [facility] CATH INVASIVE LOCATION;  Service: Cardiovascular;  Laterality: Bilateral;   • TONSILLECTOMY         Social History     Socioeconomic History   • Marital status:      Spouse name: Marimar   Tobacco Use   • Smoking status: Never Smoker   • Smokeless tobacco: Never Used   • Tobacco  comment: CAFFEINE -  2 CUPS COFFEE DAILY    Vaping Use   • Vaping Use: Never used   Substance and Sexual Activity   • Alcohol use: Not Currently     Alcohol/week: 6.0 standard drinks     Types: 6 Cans of beer per week   • Drug use: Never   • Sexual activity: Defer       Family History   Problem Relation Age of Onset   • Hypertension Mother    • Hypertension Father    • Suicidality Brother    • Autism Son    • Hypertension Brother        Review of Systems   Constitutional: Negative for decreased appetite, fever, malaise/fatigue and weight loss.   HENT: Negative for nosebleeds.    Eyes: Negative for double vision.   Cardiovascular: Negative for chest pain, claudication, cyanosis, dyspnea on exertion, irregular heartbeat, leg swelling, near-syncope, orthopnea, palpitations, paroxysmal nocturnal dyspnea and syncope.   Respiratory: Negative for cough, hemoptysis and shortness of breath.    Hematologic/Lymphatic: Negative for bleeding problem.   Skin: Negative for rash.   Musculoskeletal: Negative for falls and myalgias.   Gastrointestinal: Negative for hematochezia, jaundice, melena, nausea and vomiting.   Genitourinary: Negative for hematuria.   Neurological: Negative for dizziness and seizures.   Psychiatric/Behavioral: Negative for altered mental status and memory loss.       Allergies   Allergen Reactions   • Penicillins Rash         Current Outpatient Medications:   •  apixaban (ELIQUIS) 5 MG tablet tablet, Take 1 tablet by mouth Every 12 (Twelve) Hours., Disp: 60 tablet, Rfl: 11  •  escitalopram (LEXAPRO) 20 MG tablet, Take 20 mg by mouth Daily., Disp: , Rfl:   •  loratadine (Claritin) 10 MG tablet, Take 10 mg by mouth Daily., Disp: , Rfl:   •  Triamcinolone Acetonide (NASACORT) 55 MCG/ACT nasal inhaler, 2 sprays into the nostril(s) as directed by provider Daily., Disp: , Rfl:       Objective:     Vitals:    10/22/21 1311   BP: 114/74   BP Location: Left arm   Patient Position: Sitting   Pulse: 52   Weight: 96.6 kg  "(213 lb)   Height: 175.3 cm (69\")     Body mass index is 31.45 kg/m².    Constitutional:       Appearance: Well-developed.   Eyes:      General: No scleral icterus.  HENT:      Head: Normocephalic.   Neck:      Thyroid: No thyromegaly.      Vascular: No JVD.      Lymphadenopathy: No cervical adenopathy.   Pulmonary:      Effort: Pulmonary effort is normal.      Breath sounds: Normal breath sounds. No wheezing. No rales.   Cardiovascular:      Normal rate. Regular rhythm.      No gallop.   Edema:     Peripheral edema absent.   Abdominal:      Palpations: Abdomen is soft.      Tenderness: There is no abdominal tenderness.   Musculoskeletal: Normal range of motion. Skin:     General: Skin is warm and dry.      Findings: No rash.   Neurological:      Mental Status: Alert and oriented to person, place, and time.           ECG 12 Lead    Date/Time: 10/22/2021 2:34 PM  Performed by: Enrike Cantu MD  Authorized by: Enrike Cantu MD   Comparison: compared with previous ECG   Similar to previous ECG  Rhythm: sinus bradycardia    Clinical impression: normal ECG             Assessment:       Diagnosis Plan   1. Acute saddle pulmonary embolism with acute cor pulmonale (HCC)            Plan:       I think he is doing really well I think we are to probably stay on lifelong anticoagulation especially in light of the antiphospholipid antibody being positive I am going to just have him come back and see me in a year his LV and RV have completely recovered and look good    As always, it has been a pleasure to participate in your patient's care.      Sincerely,       Enrike Cantu MD                  "

## 2021-11-11 ENCOUNTER — HOSPITAL ENCOUNTER (OUTPATIENT)
Dept: CARDIOLOGY | Facility: HOSPITAL | Age: 61
Discharge: HOME | End: 2021-11-11

## 2021-11-11 ENCOUNTER — HOSPITAL ENCOUNTER (OUTPATIENT)
Dept: CT IMAGING | Facility: HOSPITAL | Age: 61
Discharge: HOME | End: 2021-11-11

## 2021-11-11 DIAGNOSIS — I82.4Z1 ACUTE DEEP VEIN THROMBOSIS (DVT) OF DISTAL VEIN OF RIGHT LOWER EXTREMITY (HCC): ICD-10-CM

## 2021-11-11 DIAGNOSIS — I26.02 ACUTE SADDLE PULMONARY EMBOLISM WITH ACUTE COR PULMONALE (HCC): ICD-10-CM

## 2021-11-11 LAB
BH CV LOW VAS RIGHT POPLITEAL SPONT: 1
BH CV LOWER VASCULAR LEFT COMMON FEMORAL AUGMENT: NORMAL
BH CV LOWER VASCULAR LEFT COMMON FEMORAL COMPETENT: NORMAL
BH CV LOWER VASCULAR LEFT COMMON FEMORAL COMPRESS: NORMAL
BH CV LOWER VASCULAR LEFT COMMON FEMORAL PHASIC: NORMAL
BH CV LOWER VASCULAR LEFT COMMON FEMORAL SPONT: NORMAL
BH CV LOWER VASCULAR RIGHT COMMON FEMORAL AUGMENT: NORMAL
BH CV LOWER VASCULAR RIGHT COMMON FEMORAL COMPETENT: NORMAL
BH CV LOWER VASCULAR RIGHT COMMON FEMORAL COMPRESS: NORMAL
BH CV LOWER VASCULAR RIGHT COMMON FEMORAL PHASIC: NORMAL
BH CV LOWER VASCULAR RIGHT COMMON FEMORAL SPONT: NORMAL
BH CV LOWER VASCULAR RIGHT DISTAL FEMORAL COMPRESS: NORMAL
BH CV LOWER VASCULAR RIGHT GASTRONEMIUS COMPRESS: NORMAL
BH CV LOWER VASCULAR RIGHT GREATER SAPH AK COMPRESS: NORMAL
BH CV LOWER VASCULAR RIGHT GREATER SAPH BK COMPRESS: NORMAL
BH CV LOWER VASCULAR RIGHT LESSER SAPH COMPRESS: NORMAL
BH CV LOWER VASCULAR RIGHT MID FEMORAL AUGMENT: NORMAL
BH CV LOWER VASCULAR RIGHT MID FEMORAL COMPETENT: NORMAL
BH CV LOWER VASCULAR RIGHT MID FEMORAL COMPRESS: NORMAL
BH CV LOWER VASCULAR RIGHT MID FEMORAL PHASIC: NORMAL
BH CV LOWER VASCULAR RIGHT MID FEMORAL SPONT: NORMAL
BH CV LOWER VASCULAR RIGHT PERONEAL COMPRESS: NORMAL
BH CV LOWER VASCULAR RIGHT POPLITEAL AUGMENT: NORMAL
BH CV LOWER VASCULAR RIGHT POPLITEAL COMPETENT: NORMAL
BH CV LOWER VASCULAR RIGHT POPLITEAL COMPRESS: NORMAL
BH CV LOWER VASCULAR RIGHT POPLITEAL PHASIC: NORMAL
BH CV LOWER VASCULAR RIGHT POPLITEAL SPONT: NORMAL
BH CV LOWER VASCULAR RIGHT POPLITEAL THROMBUS: NORMAL
BH CV LOWER VASCULAR RIGHT POSTERIOR TIBIAL COMPRESS: NORMAL
BH CV LOWER VASCULAR RIGHT PROXIMAL FEMORAL COMPRESS: NORMAL
BH CV LOWER VASCULAR RIGHT SAPHENOFEMORAL JUNCTION COMPRESS: NORMAL
MAXIMAL PREDICTED HEART RATE: 161 BPM
STRESS TARGET HR: 137 BPM

## 2021-11-11 PROCEDURE — 71275 CT ANGIOGRAPHY CHEST: CPT

## 2021-11-11 PROCEDURE — 93971 EXTREMITY STUDY: CPT

## 2021-11-11 PROCEDURE — 0 IOPAMIDOL PER 1 ML: Performed by: INTERNAL MEDICINE

## 2021-11-11 RX ADMIN — IOPAMIDOL 100 ML: 755 INJECTION, SOLUTION INTRAVENOUS at 12:27

## 2021-11-15 ENCOUNTER — OFFICE VISIT (OUTPATIENT)
Dept: LAB | Facility: HOSPITAL | Age: 61
End: 2021-11-15

## 2021-11-15 ENCOUNTER — OFFICE VISIT (OUTPATIENT)
Dept: ONCOLOGY | Facility: CLINIC | Age: 61
End: 2021-11-15

## 2021-11-15 VITALS
WEIGHT: 215.6 LBS | RESPIRATION RATE: 16 BRPM | SYSTOLIC BLOOD PRESSURE: 114 MMHG | OXYGEN SATURATION: 97 % | BODY MASS INDEX: 31.93 KG/M2 | HEIGHT: 69 IN | TEMPERATURE: 97.3 F | DIASTOLIC BLOOD PRESSURE: 77 MMHG | HEART RATE: 74 BPM

## 2021-11-15 DIAGNOSIS — I26.02 ACUTE SADDLE PULMONARY EMBOLISM WITH ACUTE COR PULMONALE (HCC): ICD-10-CM

## 2021-11-15 DIAGNOSIS — I82.4Z1 ACUTE DEEP VEIN THROMBOSIS (DVT) OF DISTAL VEIN OF RIGHT LOWER EXTREMITY (HCC): ICD-10-CM

## 2021-11-15 DIAGNOSIS — I26.02 ACUTE SADDLE PULMONARY EMBOLISM WITH ACUTE COR PULMONALE (HCC): Primary | ICD-10-CM

## 2021-11-15 DIAGNOSIS — R76.0 ANTICARDIOLIPIN ANTIBODY POSITIVE: ICD-10-CM

## 2021-11-15 LAB
ALBUMIN SERPL-MCNC: 4.6 G/DL (ref 3.5–5.2)
ALBUMIN/GLOB SERPL: 1.5 G/DL
ALP SERPL-CCNC: 90 U/L (ref 39–117)
ALT SERPL W P-5'-P-CCNC: 32 U/L (ref 1–41)
ANION GAP SERPL CALCULATED.3IONS-SCNC: 12.4 MMOL/L (ref 5–15)
AST SERPL-CCNC: 42 U/L (ref 1–40)
BASOPHILS # BLD AUTO: 0.03 10*3/MM3 (ref 0–0.2)
BASOPHILS NFR BLD AUTO: 0.5 % (ref 0–1.5)
BILIRUB SERPL-MCNC: 0.5 MG/DL (ref 0–1.2)
BUN SERPL-MCNC: 17 MG/DL (ref 6–20)
BUN/CREAT SERPL: 16.3 (ref 7–25)
CALCIUM SPEC-SCNC: 9.3 MG/DL (ref 8.6–10.5)
CHLORIDE SERPL-SCNC: 104 MMOL/L (ref 98–107)
CO2 SERPL-SCNC: 23.6 MMOL/L (ref 22–29)
CREAT SERPL-MCNC: 1.04 MG/DL (ref 0.76–1.27)
DEPRECATED RDW RBC AUTO: 41.3 FL (ref 37–54)
EOSINOPHIL # BLD AUTO: 0.14 10*3/MM3 (ref 0–0.4)
EOSINOPHIL NFR BLD AUTO: 2.4 % (ref 0.3–6.2)
ERYTHROCYTE [DISTWIDTH] IN BLOOD BY AUTOMATED COUNT: 12.6 % (ref 12.3–15.4)
GFR SERPL CREATININE-BSD FRML MDRD: 73 ML/MIN/1.73
GLOBULIN UR ELPH-MCNC: 3 GM/DL
GLUCOSE SERPL-MCNC: 89 MG/DL (ref 65–99)
HCT VFR BLD AUTO: 44.6 % (ref 37.5–51)
HGB BLD-MCNC: 14.8 G/DL (ref 13–17.7)
IMM GRANULOCYTES # BLD AUTO: 0.01 10*3/MM3 (ref 0–0.05)
IMM GRANULOCYTES NFR BLD AUTO: 0.2 % (ref 0–0.5)
LYMPHOCYTES # BLD AUTO: 1.65 10*3/MM3 (ref 0.7–3.1)
LYMPHOCYTES NFR BLD AUTO: 28.3 % (ref 19.6–45.3)
MCH RBC QN AUTO: 29.7 PG (ref 26.6–33)
MCHC RBC AUTO-ENTMCNC: 33.2 G/DL (ref 31.5–35.7)
MCV RBC AUTO: 89.4 FL (ref 79–97)
MONOCYTES # BLD AUTO: 0.52 10*3/MM3 (ref 0.1–0.9)
MONOCYTES NFR BLD AUTO: 8.9 % (ref 5–12)
NEUTROPHILS NFR BLD AUTO: 3.48 10*3/MM3 (ref 1.7–7)
NEUTROPHILS NFR BLD AUTO: 59.7 % (ref 42.7–76)
NRBC BLD AUTO-RTO: 0 /100 WBC (ref 0–0.2)
PLATELET # BLD AUTO: 205 10*3/MM3 (ref 140–450)
PMV BLD AUTO: 9.8 FL (ref 6–12)
POTASSIUM SERPL-SCNC: 4.4 MMOL/L (ref 3.5–5.2)
PROT SERPL-MCNC: 7.6 G/DL (ref 6–8.5)
RBC # BLD AUTO: 4.99 10*6/MM3 (ref 4.14–5.8)
SODIUM SERPL-SCNC: 140 MMOL/L (ref 136–145)
WBC # BLD AUTO: 5.83 10*3/MM3 (ref 3.4–10.8)

## 2021-11-15 PROCEDURE — 85300 ANTITHROMBIN III ACTIVITY: CPT | Performed by: INTERNAL MEDICINE

## 2021-11-15 PROCEDURE — 36415 COLL VENOUS BLD VENIPUNCTURE: CPT | Performed by: INTERNAL MEDICINE

## 2021-11-15 PROCEDURE — 85025 COMPLETE CBC W/AUTO DIFF WBC: CPT

## 2021-11-15 PROCEDURE — 80053 COMPREHEN METABOLIC PANEL: CPT | Performed by: INTERNAL MEDICINE

## 2021-11-15 PROCEDURE — 99214 OFFICE O/P EST MOD 30 MIN: CPT | Performed by: INTERNAL MEDICINE

## 2021-11-15 NOTE — PROGRESS NOTES
Subjective           DURING THE VISIT WITH THE PATIENT TODAY , PATIENT HAD FACE MASK, MY MEDICAL ASSISTANT AND I  HAD PROPPER PROTECTIVE EQUIPMENT, AND I DID HAND HYGIENE WITH SOAP AND WATER BEFORE AND AFTER THE VISIT.     REASONS FOR FOLLOWUP:1  MASSIVE PULMONARY EMBOLISM WITH SEVERE PULMONARY HYPERTENSION REQUIRING CATHETER EMBOLECTOMY  AND SUBSEQUENT ANTICOAGULATION WITH ELIQUIS.  2. DVT RLE AFTER LONG DRIVE 20 HS TO [place].  3. POSITIVE ANTICARDIOLIPIN ANTIBODY IGM. NEGATIVE LUPUS ANTICOAGULANT.    HISTORY OF PRESENT ILLNESS:  The patient is a 60 y.o. year old male  who is here for follow-up with the above-mentioned history.    This patient returns today to the office for follow up. He is here today in company of his wife after he has had a new CT angiogram of the chest and Doppler study of the right lower extremity in the background of thrombophilia, massive pulmonary embolism, pulmonary hypertension and clot in the right lower extremity. At this time the patient feels perfectly fine. He is not having and shortness of breath, chest pain, palpitations, cough, sputum production or pleuritic pain. He has not had any clinical bleeding. His appetite remains good, his bowel activity and urination are normal. He has minimal swelling in the right lower extremity with minimal pain. He is wearing elastic support. He has not had any other new issues. He has been extremely careful in regard to the utilization of alcohol drinking very small amount of beer as per my advice during the previous visit.        HEMATOLOGIC HISTORY:  Obviously the story of this patient is very typical. He drove to [place] on his own for 20 hours, not stopping. He developed pain in the calf of the right lower extremity that he called markus horse. He has shortness of breath presumed to be related to altitude sickness in [place] and upon returning to the [place] the shortness of breath remained ongoing and became worse. Since then he has had  times that he has been able to do his workouts with no major difficulties and later on his workouts have been minimized given the shortness of breath produced by minimal physical activity. A few days ago he had a new pain in the right calf close to the Achilles tendon. While mowing the grass yesterday he had syncope and very likely the explanation of all this is his pulmonary vasculature could not accommodate anymore blood clots and the flow through the left ventricle from the pulmonary veins was minimized producing syncope. The patient is alive by miracle. The procedure done by Dr. Cantu and stated above is more than dramatic in the description and tells us the amount of clots that this patient had. I pointed out to the patient that it is a miracle that he stayed alive. Many people go through this and they do not live to tell a story. Therefore, if somebody needs to have flowers and chocolates, it is Dr. Cantu and the emergency room team at [facility]. I think the quick transition and the care that this patient received is world quality and deserves to be credited.      The patient has no personal history of thrombophilia before. He has no family history of thrombophilia. He has not been taking anabolic steroids. His white count, hemoglobin and platelets are normal. His platelet count is minimally low because of platelet consumption associated with massive amount of clotting. Chemistry profile is normal. The CT angiogram from [facility] has been reviewed. Eventually we will need to ask the radiologist to amend the report because he mentioned something in the renal artery that probably is a type error. The description by Dr. Cantu has been reviewed.      The patient is already receiving Eliquis and I advised him that he will remain on Eliquis at least for a year and in my opinion probably for the rest of his life. Obviously we need to proceed with thrombophilia assessment but I think in  my opinion the most likely factor that triggered all this was the long trip to [place]. He probably has had showers and showers and showers of clots and emboli and his pulmonary circulation could not accommodate anymore clots. He ran out of physical space to put more clots and the circulation into the left side of the heart through the pulmonary veins ran out. I pointed out to him that it is like if the main water entrance of the water to the house has a clog, the rest of the house is not going to have any water. In any event the patient is up and running. He looks terrific. He has no clinical evidence of pulmonary hypertension on clinical examination and it is amazing that he is alive. I think the tolerance to all this is because he is a very fit individual who exercises all the time.      Obviously I am going to need to proceed with thrombophilia assessment that will include factor V Leiden mutation, prothrombin gene mutation, antithrombin III, lupus anticoagulant, anti-glycoprotein antibody profile, anticardiolipin antibody profile as well as protein C, protein S, fibrinogen, lipid profile as well as lupus anticoagulant. I will perform as well a serum protein electrophoresis and serum free light chains.      The patient has had screening colonoscopy in the past, prostate examination being negative normal. No family history of malignancy. No family history of thrombophilia. I need to see this patient in the office in a couple of weeks to go through the report of his laboratory testing. Eventually we will need to repeat a CT angiogram of the chest to be sure that all of his clots in the pulmonary circulation are clean and I am sure Dr. Cantu eventually will perform another echocardiogram to be sure that there is resolution of his pulmonary hypertension by this methodology.      I pointed out to the patient that he will need to stay away from trauma and I made the description in the common sense utilization at  this point.      The patient likes to drink his beers. He does not get drunk but I pointed out that anticoagulants are not good friends of alcohol and his wife will take care of this problem.      He raised the question if sex life will have anything to do with all these issues in regard to inability to perform and I pointed out to him that should not be an issue. I asked him to give himself a break until things get better in the next couple of weeks.    The patient had a history of going to [place] to take a car to his daughter, driving for 20 hours almost nonstop and he developed swelling and pain in the right lower extremity that continued intermittently after he culminated all the issues pertinent to above. My notes from the original consultation were very explicit and described the history of the present illness extremely well in my opinion. In any event, since the patient was discharged from the hospital, he has been getting better and better as time goes by. He has very occasional sensation of flip flop in his heart but no sensation of persistent tachyarrhythmia. No chest pain, no pleuritic pain, no hemoptysis, no cough, no shortness of breath. His appetite has been terrific. He has not had any diarrhea like he was having before and he has no abdominal pain, distention or jaundice. His urination is normal. He has had significant improvement but not complete resolution of the swelling in the right lower extremity with no pain. He has not had any clinical bleeding. He feels extremely well. He has not had any fever or infection. He reminded me that some of these events happened after he proceeded with his COVID vaccination weeks before the trip.    THROMBOPHILIA  CLINICAL AND LABORATORY REPORT:  DATE:    10 /4 /2021     DIAGNOSIS: MASSIVE PE REQUIRING EMBOLECTOMY. DVT RLE      KEY: P EQUAL TO POSITIVE  , N  EQUAL TO NEGATIVE. NA : NOT APLICABLE    PREPARED BY HAYLEY DICKERSON MD STAYS IN THE CHART  PERMANENTLY      CLINICAL FACTORS:     OBESITY: ___N___     DIABETES: __N____.    IMMOBILITY: __N____.    SMOKING HISTORY : ______N______ PACK A DAY/ YEARS ________    LONG TRIPS BY CAR OR AIRPLANE: __YES 20 HS DRIVE TO [place]______    RECENT SURGERY: ____N______   LOCATION: ________    TRAUMA: ___N_____    MEDICATIONS:   BIRTH CONTROL MEDICATIONS ___N_____ ANDROGENS _N______ ESTROGENS ___N_____ HEPARIN __N______ OTHERS ____N___    PREGNANCY:   ____N____.    FAMILY HISTORY OF BLOOD CLOTHS:    POSITIVE:   ________ NEGATIVE ___N____    HEMATOLOGIC DISORDER:  POLYCYTHEMIA VERA ____N___ THROMBOCYTOSIS ___N___    DIC:___N____  TTP ___N_______    COLLAGEN VASCULAR DISEASE: _N____   DIAGNOSIS: ________________________.    VASCULITIS: TYPE AND LOCATION ____N___________ BIOPSY PROVEN:____________    CANCER DIAGNOSIS: __N_____    TYPE ________    CANCER SCREENING:  BREAST _______ COLON ___Y_____ LUNG ________ GENITOURINARY __Y_______   CT SCANS ___________ NORMAL ______ ABNORMAL ________    INDWELLING VASCULAR DEVICE:  ___N______. LOCATION _________    INFLAMMATORY BOWEL DISEASE: ULCERATIVE COLITIS ______N____   CHRON'S DISEASE ___________    RECENT COVID 19 INFECTION __N_____________.          LABORATORY:    FACTOR V LEIDEN MUTATION: POSITIVE   _____ NEGATIVE __Y___ HETEROZYGOUS ________ HOMOZYGOUS ________    PROTHROMBIN GENE MUTATION:  POSITIVE _____ NEGATIVE __Y___    ANTITHROMBIN III : NORMAL _____   ABNORMAL __Y____ QUANTIFICATION: __66_____    PROTEIN S ANTIGEN ____N______ PROTEIN S FUNCTIONAL _____N___ PROTEIN C ___N______     LUPUS ANTICOAGULANT: POSITIVE _____  NEGATIVE _N____. REPEATED  6 WEEKS LATER ______    ANTICARDIOLIPIN ANTIBODY PROFILE : POSITIVE ___Y___  NEGATIVE ______  IGG __N___ IGM _34____ REPEATED 6 WEEKS LATER _________    ANTI GLYCOPROTEIN ANTIBODY:  POSITIVE _____  NEGATIVE  __N_____   IGG _____ IGM _____ REPEATED 6 WEEKS LATER ________    ANTIPHOSPATIDIL SERINE ANTIBODY:  POSITIVE _____ NEGATIVE  __N_____    SERUM PROTEIN ELECTROPHORESIS AND IMMUNOFIXATION: NO MONOCLONAL PROTEIN ___N_____ POSITIVE MONOCLONAL PROTEIN ______ TYPE _________    C REACTIVE PROTEIN HIGH SENSIBILITY: NORMAL _______ ABNORMAL :QUANTIFICATION ________    SEDIMENTATION RATE: _____ MM/H.    FIBRINOGEN LEVEL: _________ NORMAL __Y_____  ABNORMAL __________.    LIPID PROFILE:    CHOLESTEROL HIGH _____N_____  TRYGLYCERIDES HIGH  ___N_____    HEPARIN ASSOCIATED ANTIPLATELET ANTIBODY: __NA_______          Past Medical History:   Diagnosis Date   • DVT (deep venous thrombosis) (HCC)     RLE in the distal femoral, popliteal, posterior tibial, peroneal, and soleal.   • Pulmonary emboli (HCC)         Past Surgical History:   Procedure Laterality Date   • APPENDECTOMY     • CARDIAC CATHETERIZATION N/A 9/18/2021    Procedure: Percutaneous Manual Thrombectomy- INARI;  Surgeon: Enrike Cantu MD;  Location: [facility] INVASIVE LOCATION;  Service: Cardiovascular;  Laterality: N/A;   • CARDIAC CATHETERIZATION N/A 9/18/2021    Procedure: Right Heart Cath;  Surgeon: Enrike Cantu MD;  Location: [facility] INVASIVE LOCATION;  Service: Cardiovascular;  Laterality: N/A;   • COLONOSCOPY      negative normal   • EMBOLECTOMY     • INTERVENTIONAL RADIOLOGY PROCEDURE Bilateral 9/18/2021    Procedure: Pumonary angiography -Bilateral;  Surgeon: Enrike Cantu MD;  Location: [facility] CATH INVASIVE LOCATION;  Service: Cardiovascular;  Laterality: Bilateral;   • TONSILLECTOMY          Current Outpatient Medications on File Prior to Visit   Medication Sig Dispense Refill   • apixaban (ELIQUIS) 5 MG tablet tablet Take 1 tablet by mouth Every 12 (Twelve) Hours. 60 tablet 11   • escitalopram (LEXAPRO) 20 MG tablet Take 20 mg by mouth Daily.     • loratadine (Claritin) 10 MG tablet Take 10 mg by mouth Daily.     • Triamcinolone Acetonide (NASACORT) 55 MCG/ACT nasal inhaler 2 sprays into the nostril(s) as directed by provider Daily.       No current  "facility-administered medications on file prior to visit.        ALLERGIES:    Allergies   Allergen Reactions   • Penicillins Rash        Social History     Socioeconomic History   • Marital status:      Spouse name: Marimar   Tobacco Use   • Smoking status: Never Smoker   • Smokeless tobacco: Never Used   • Tobacco comment: CAFFEINE -  2 CUPS COFFEE DAILY    Vaping Use   • Vaping Use: Never used   Substance and Sexual Activity   • Alcohol use: Not Currently     Alcohol/week: 6.0 standard drinks     Types: 6 Cans of beer per week   • Drug use: Never   • Sexual activity: Defer        Family History   Problem Relation Age of Onset   • Hypertension Mother    • Hypertension Father    • Suicidality Brother    • Autism Son    • Hypertension Brother           Objective     Vitals:    11/15/21 1136   BP: 114/77   Pulse: 74   Resp: 16   Temp: 97.3 °F (36.3 °C)   TempSrc: Temporal   SpO2: 97%   Weight: 97.8 kg (215 lb 9.6 oz)   Height: 175.3 cm (69.02\")   PainSc: 0-No pain     Current Status 10/4/2021   ECOG score 0       Physical Exam  I HAVE PERSONALLY REVIEWED THE HISTORY OF THE PRESENT ILLNESS, PAST MEDICAL HISTORY, FAMILY HISTORY, SOCIAL HISTORY, ALLERGIES, MEDICATIONS STATED ABOVE IN THE  NOTE FROM TODAY.        GENERAL:  Well-developed, well-nourished  Patient  in no acute distress.   SKIN:  Warm, dry ,NO rashes,NO purpura ,NO petechiae.  HEENT:  Pupils were equal and reactive to light and accomodation, conjunctivae noninjected, no pterygium, normal extraocular movements, normal visual acuity.   NECK:  Supple with good range of motion; no thyromegaly , no other masses, no JVD or bruits, no cervical adenopathies.No carotid artery pain, no carotid abnormal pulsation , NO arterial dance.  LYMPHATICS:  No cervical, NO supraclavicular, NO axillary,NO epitrochlear , NO inguinal adenopathy.  CARDIAC   normal rate and regular rhythm, without murmur,NO rubs NO S3 NO S4 right or left .  LUNGS: normal breath sounds " bilateral, no wheezing, rhonchi, crackles or rubs.  VASCULAR VENOUS: no cyanosis, collateral circulation, varicosities, edema, palpable cords, pain, erythema.  ABDOMEN:  Soft, nontender with no hepatomegaly, no splenomegaly,no masses, no ascites, no collateral circulation,no distention,no Fredrick sign.  EXTREMITIES  AND SPINE:  No clubbing, cyanosis or edema, no deformities , no pain .No kyphosis, scoliosis, no other deformities, no pain in spine, no pain in ribs , no pain inpelvic bone.  NEUROLOGICAL:  Patient was awake, alert, oriented to time, person and place.        RECENT LABS:  Hematology WBC   Date Value Ref Range Status   10/04/2021 10.17 3.40 - 10.80 10*3/mm3 Final     RBC   Date Value Ref Range Status   10/04/2021 4.92 4.14 - 5.80 10*6/mm3 Final     Hemoglobin   Date Value Ref Range Status   10/04/2021 14.8 13.0 - 17.7 g/dL Final     Hematocrit   Date Value Ref Range Status   10/04/2021 44.8 37.5 - 51.0 % Final     Platelets   Date Value Ref Range Status   10/04/2021 199 140 - 450 10*3/mm3 Final       CBC:    WBC   Date Value Ref Range Status   10/04/2021 10.17 3.40 - 10.80 10*3/mm3 Final     RBC   Date Value Ref Range Status   10/04/2021 4.92 4.14 - 5.80 10*6/mm3 Final     Hemoglobin   Date Value Ref Range Status   10/04/2021 14.8 13.0 - 17.7 g/dL Final     Hematocrit   Date Value Ref Range Status   10/04/2021 44.8 37.5 - 51.0 % Final     MCV   Date Value Ref Range Status   10/04/2021 91.1 79.0 - 97.0 fL Final     MCH   Date Value Ref Range Status   10/04/2021 30.1 26.6 - 33.0 pg Final     MCHC   Date Value Ref Range Status   10/04/2021 33.0 31.5 - 35.7 g/dL Final     RDW   Date Value Ref Range Status   10/04/2021 12.0 (L) 12.3 - 15.4 % Final     RDW-SD   Date Value Ref Range Status   10/04/2021 40.1 37.0 - 54.0 fl Final     MPV   Date Value Ref Range Status   10/04/2021 9.1 6.0 - 12.0 fL Final     Platelets   Date Value Ref Range Status   10/04/2021 199 140 - 450 10*3/mm3 Final     Neutrophil %   Date  Value Ref Range Status   10/04/2021 73.8 42.7 - 76.0 % Final     Lymphocyte %   Date Value Ref Range Status   10/04/2021 16.6 (L) 19.6 - 45.3 % Final     Monocyte %   Date Value Ref Range Status   10/04/2021 7.6 5.0 - 12.0 % Final     Eosinophil %   Date Value Ref Range Status   10/04/2021 1.4 0.3 - 6.2 % Final     Basophil %   Date Value Ref Range Status   10/04/2021 0.4 0.0 - 1.5 % Final     Immature Grans %   Date Value Ref Range Status   10/04/2021 0.2 0.0 - 0.5 % Final     Neutrophils, Absolute   Date Value Ref Range Status   10/04/2021 7.51 (H) 1.70 - 7.00 10*3/mm3 Final     Lymphocytes, Absolute   Date Value Ref Range Status   10/04/2021 1.69 0.70 - 3.10 10*3/mm3 Final     Monocytes, Absolute   Date Value Ref Range Status   10/04/2021 0.77 0.10 - 0.90 10*3/mm3 Final     Eosinophils, Absolute   Date Value Ref Range Status   10/04/2021 0.14 0.00 - 0.40 10*3/mm3 Final     Basophils, Absolute   Date Value Ref Range Status   10/04/2021 0.04 0.00 - 0.20 10*3/mm3 Final     Immature Grans, Absolute   Date Value Ref Range Status   10/04/2021 0.02 0.00 - 0.05 10*3/mm3 Final     nRBC   Date Value Ref Range Status   10/04/2021 0.0 0.0 - 0.2 /100 WBC Final        CMP:    Glucose   Date Value Ref Range Status   10/04/2021 101 74 - 124 mg/dL Final     BUN   Date Value Ref Range Status   10/04/2021 26 (H) 6 - 20 mg/dL Final     Creatinine   Date Value Ref Range Status   10/04/2021 1.13 0.70 - 1.30 mg/dL Final     Sodium   Date Value Ref Range Status   10/04/2021 139 134 - 145 mmol/L Final     Potassium   Date Value Ref Range Status   10/04/2021 4.8 (H) 3.5 - 4.7 mmol/L Final     Chloride   Date Value Ref Range Status   10/04/2021 103 98 - 107 mmol/L Final     CO2   Date Value Ref Range Status   10/04/2021 24.7 22.0 - 29.0 mmol/L Final     Calcium   Date Value Ref Range Status   10/04/2021 9.5 8.5 - 10.2 mg/dL Final     Total Protein   Date Value Ref Range Status   10/04/2021 7.4 6.3 - 8.0 g/dL Final     Albumin   Date Value  Ref Range Status   10/04/2021 4.50 3.50 - 5.20 g/dL Final     ALT (SGPT)   Date Value Ref Range Status   10/04/2021 24 0 - 41 U/L Final     AST (SGOT)   Date Value Ref Range Status   10/04/2021 37 0 - 40 U/L Final     Alkaline Phosphatase   Date Value Ref Range Status   10/04/2021 100 38 - 116 U/L Final     Total Bilirubin   Date Value Ref Range Status   10/04/2021 0.5 0.2 - 1.2 mg/dL Final     Globulin   Date Value Ref Range Status   10/04/2021 2.9 1.8 - 3.5 gm/dL Final     A/G Ratio   Date Value Ref Range Status   10/04/2021 1.6 1.1 - 2.4 g/dL Final     BUN/Creatinine Ratio   Date Value Ref Range Status   10/04/2021 23.0 7.3 - 30.0 Final     Anion Gap   Date Value Ref Range Status   10/04/2021 11.3 5.0 - 15.0 mmol/L Final           Recent imaging:    Adult Transthoracic Echo Complete W/ Cont if Necessary Per Protocol    Result Date: 10/19/2021  Narrative: · Calculated left ventricular EF = 63.8% Estimated left ventricular EF = 64% Estimated left ventricular EF was in agreement with the calculated left ventricular EF. Left ventricular systolic function is normal. Normal left ventricular wall thickness noted. The left ventricular cavity is mildly dilated. All left ventricular wall segments contract normally. Left ventricular diastolic function was normal. · Trace tricuspid valve regurgitation is present. Estimated right ventricular systolic pressure from tricuspid regurgitation is normal (<35 mmHg). Calculated right ventricular systolic pressure from tricuspid regurgitation is 11 mmHg.      CT Chest Pulmonary Embolism    Result Date: 11/11/2021  Narrative: CT CHEST PULMONARY EMBOLISM-  Date of Exam: 11/11/2021 11:44 AM  Indication: pe; I82.4Z1-Acute embolism and thrombosis of unspecified deep veins of right distal lower extremity; I26.02-Saddle embolus of pulmonary artery with acute cor pulmonale.  Comparison: 9/17/2021  Technique: Serial and axial CT images of the chest were obtained following the uneventful  intravenous administration of 100 mL Isovue-370 contrast. Reconstructions in the coronal and sagittal planes were also performed.  Automated exposure control and iterative reconstruction methods were used.  FINDINGS: Resolution of bilateral pulmonary emboli. The pulmonary artery is normal in caliber. No evidence of right heart strain. No adenopathy. The lungs are clear bilaterally. Esophagus is normal. Thyroid is normal. Limited evaluation of the upper abdomen is normal. No aggressive appearing lytic or sclerotic bone lesions are identified.      Impression:  1. Resolution of pulmonary embolism.    Electronically Signed By-Konstantin Croft MD On:11/11/2021 12:46 PM This report was finalized on [id number] by  Konstantin Croft MD.    Duplex Venous Lower Extremity - Right CAR    Result Date: 11/11/2021  Narrative: · Sub-acute right lower extremity deep vein thrombosis noted in the popliteal. · All other right sided veins appeared normal.         Assessment/Plan     Diagnoses and all orders for this visit:    1. Acute saddle pulmonary embolism with acute cor pulmonale (HCC) (Primary)  -     CBC & Differential; Future  -     Beta-2 Glycoprotein Antibodies  -     Anticardiolipin Antibody, IgG / M, Qn  -     Anticardiolipin Antibody, IgG / M, Qn; Future  -     Beta-2 Glycoprotein Antibodies; Future  -     Antithrombin III    2. Anticardiolipin antibody positive  -     CBC & Differential; Future  -     Beta-2 Glycoprotein Antibodies  -     Anticardiolipin Antibody, IgG / M, Qn  -     Anticardiolipin Antibody, IgG / M, Qn; Future  -     Beta-2 Glycoprotein Antibodies; Future  -     Antithrombin III    3. Acute deep vein thrombosis (DVT) of distal vein of right lower extremity (HCC)  -     CBC & Differential; Future  -     Beta-2 Glycoprotein Antibodies  -     Anticardiolipin Antibody, IgG / M, Qn  -     Anticardiolipin Antibody, IgG / M, Qn; Future  -     Beta-2 Glycoprotein Antibodies; Future  -     Antithrombin III          The patient was brought back to the office on 10/04/2021. Since the hospital stay he has been terrific at home. He has gained back his mobility and his ability to function with no limitations. He has had near complete resolution of the swelling and pain in the right calf and right lower extremity and he has not had any shortness of breath, palpitations, pleuritic pain, cough, hemoptysis. He occasional has the sensation of the heart that flip flops and lasts for 1 or 2 seconds in absence of any pain.     He has not had any clinical bleeding on the Eliquis.     His clinical examination today besides minimal residual swelling in the right lower extremity shows no other abnormalities and there is no evidence clinically of pulmonary hypertension.     The thrombophilia labs have documented the only abnormality, an anticardiolipin antibody IgM that was marginally positive. The rest of the studies as posted above in the hematological history were negative. This leads me to conclude that the most likely factor that triggered the clot in this patient was the positive anticardiolipin antibody but most importantly the long drive to [place], 20 hours in a car. The patient stopped on 2 occasions for gasoline and food and that was the rest of the story. He developed swelling on the trip in the right lower extremity and he kept having swelling and pain in the calf for many weeks after that having very likely frequent showers of emboli that filled up the pulmonary circulation and culminated with his syncopal episode while mowing grass.     The patient's clinical examination today otherwise is unrevealing. He feels great. He also has resolution of the thrombocytopenia that he had in the hospital that is probably associated with excessive platelet consumption but massive amount of clots. The platelet count, hemoglobin and white count today are completely normal.     My recommendations for this patient are as follows:   1. He will  remain on his Eliquis for the time being and I have recommended to the patient to remain on this medicine for the rest of his life. I feel afraid of this patient having another episode and I feel afraid of this patient having any other issues that would culminate with more complications or problems associated with this process.   2. I advised the patient to have a repeat CT angiogram of the chest and Doppler study of the right lower extremity in 5 weeks and to repeat the level of antithrombin III as well as the anticardiolipin antibody in 5 weeks. I would like to review him back in 6 weeks.   3. I advised him to use elastic support in the right lower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

## 2021-11-16 LAB
AT III PPP CHRO-ACNC: 101 % (ref 90–134)
CARDIOLIPIN IGG SER IA-ACNC: <9 GPL U/ML (ref 0–14)
CARDIOLIPIN IGM SER IA-ACNC: 21 MPL U/ML (ref 0–12)

## 2021-11-17 LAB
B2 GLYCOPROT1 IGA SER-ACNC: <9 GPI IGA UNITS (ref 0–25)
B2 GLYCOPROT1 IGG SER-ACNC: <9 GPI IGG UNITS (ref 0–20)
B2 GLYCOPROT1 IGM SER-ACNC: <9 GPI IGM UNITS (ref 0–32)

## 2021-11-24 ENCOUNTER — SPECIALTY PHARMACY (OUTPATIENT)
Dept: PHARMACY | Facility: HOSPITAL | Age: 61
End: 2021-11-24

## 2021-11-24 ENCOUNTER — TELEPHONE (OUTPATIENT)
Dept: PHARMACY | Facility: HOSPITAL | Age: 61
End: 2021-11-24

## 2021-11-24 NOTE — PROGRESS NOTES
[facility] telephone follow up- Eliquis to Xarelto switch     Mr. Gupta called Triage today concerned with the following warning on Xarelto: Antiphospholipid syndrome: Use not recommended in patients with triple-positive antiphospholipid syndrome; safety and efficacy have not been established. Patients positive for all 3 antiphospholipid antibodies (lupus anticoagulant, anticardiolipin, and anti-beta 2-glycoprotein I) may have increased rates of recurrent thrombotic events compared with vitamin K antagonist therapy. We discussed that this warning also pertains to Eliquis. In general patient with APS have a higher risk of thrombosis and it is recommended to be on blood thinners. We discussed that he tested positive for one of the anticardiolipin antibodies and that this number is trending down.     Mr. Gupta verbalized understanding and plans to finish his Elquis prescription and then he will start Xarelto due to insurance restrictions that will take effect the first of the new year.     Thanks,   Denise Rothman, JanineD

## 2021-11-24 NOTE — TELEPHONE ENCOUNTER
----- Message from Bonnie Ren RN sent at 11/24/2021  1:19 PM EST -----  Regarding: FW: New Xarelto Prescription    ----- Message -----  From: Christopher Gupta  Sent: 11/24/2021   1:04 PM EST  To: [facility]  Subject: New Xarelto Prescription                         I am in the process of switching my prescription from Eliquis to Xarelto. On the warnings for Xarelto, there is the following warning: Increased Risk of Thrombosis in Patients with Antiphospholipid Syndrome. Does this pose a risk for me?    ThanksDonald

## 2021-12-28 ENCOUNTER — TRANSCRIBE ORDERS (OUTPATIENT)
Dept: ADMINISTRATIVE | Facility: HOSPITAL | Age: 61
End: 2021-12-28

## 2021-12-28 ENCOUNTER — LAB (OUTPATIENT)
Dept: LAB | Facility: HOSPITAL | Age: 61
End: 2021-12-28

## 2021-12-28 DIAGNOSIS — F32.9 DEPRESSION, REACTIVE: ICD-10-CM

## 2021-12-28 DIAGNOSIS — Z12.5 SCREENING FOR PROSTATE CANCER: ICD-10-CM

## 2021-12-28 DIAGNOSIS — Z80.42 FAMILY HISTORY OF MALIGNANT NEOPLASM OF PROSTATE: ICD-10-CM

## 2021-12-28 DIAGNOSIS — Z00.00 EXAMINATION: Primary | ICD-10-CM

## 2021-12-28 DIAGNOSIS — Z00.00 EXAMINATION: ICD-10-CM

## 2021-12-28 LAB
ALBUMIN SERPL-MCNC: 4.5 G/DL (ref 3.5–5.2)
ALBUMIN/GLOB SERPL: 1.6 G/DL
ALP SERPL-CCNC: 93 U/L (ref 39–117)
ALT SERPL W P-5'-P-CCNC: 32 U/L (ref 1–41)
ANION GAP SERPL CALCULATED.3IONS-SCNC: 9.7 MMOL/L (ref 5–15)
AST SERPL-CCNC: 36 U/L (ref 1–40)
BASOPHILS # BLD AUTO: 0.03 10*3/MM3 (ref 0–0.2)
BASOPHILS NFR BLD AUTO: 0.4 % (ref 0–1.5)
BILIRUB SERPL-MCNC: 0.6 MG/DL (ref 0–1.2)
BUN SERPL-MCNC: 21 MG/DL (ref 8–23)
BUN/CREAT SERPL: 22.1 (ref 7–25)
CALCIUM SPEC-SCNC: 9.3 MG/DL (ref 8.6–10.5)
CHLORIDE SERPL-SCNC: 101 MMOL/L (ref 98–107)
CHOLEST SERPL-MCNC: 253 MG/DL (ref 0–200)
CO2 SERPL-SCNC: 27.3 MMOL/L (ref 22–29)
CREAT SERPL-MCNC: 0.95 MG/DL (ref 0.76–1.27)
DEPRECATED RDW RBC AUTO: 42.2 FL (ref 37–54)
EOSINOPHIL # BLD AUTO: 0.16 10*3/MM3 (ref 0–0.4)
EOSINOPHIL NFR BLD AUTO: 2.2 % (ref 0.3–6.2)
ERYTHROCYTE [DISTWIDTH] IN BLOOD BY AUTOMATED COUNT: 13.1 % (ref 12.3–15.4)
GFR SERPL CREATININE-BSD FRML MDRD: 81 ML/MIN/1.73
GLOBULIN UR ELPH-MCNC: 2.9 GM/DL
GLUCOSE SERPL-MCNC: 85 MG/DL (ref 65–99)
HCT VFR BLD AUTO: 44.9 % (ref 37.5–51)
HDLC SERPL-MCNC: 45 MG/DL (ref 40–60)
HGB BLD-MCNC: 14.9 G/DL (ref 13–17.7)
IMM GRANULOCYTES # BLD AUTO: 0.02 10*3/MM3 (ref 0–0.05)
IMM GRANULOCYTES NFR BLD AUTO: 0.3 % (ref 0–0.5)
LDLC SERPL CALC-MCNC: 178 MG/DL (ref 0–100)
LDLC/HDLC SERPL: 3.89 {RATIO}
LYMPHOCYTES # BLD AUTO: 2.02 10*3/MM3 (ref 0.7–3.1)
LYMPHOCYTES NFR BLD AUTO: 27.7 % (ref 19.6–45.3)
MCH RBC QN AUTO: 29.6 PG (ref 26.6–33)
MCHC RBC AUTO-ENTMCNC: 33.2 G/DL (ref 31.5–35.7)
MCV RBC AUTO: 89.1 FL (ref 79–97)
MONOCYTES # BLD AUTO: 0.62 10*3/MM3 (ref 0.1–0.9)
MONOCYTES NFR BLD AUTO: 8.5 % (ref 5–12)
NEUTROPHILS NFR BLD AUTO: 4.44 10*3/MM3 (ref 1.7–7)
NEUTROPHILS NFR BLD AUTO: 60.9 % (ref 42.7–76)
NRBC BLD AUTO-RTO: 0 /100 WBC (ref 0–0.2)
PLATELET # BLD AUTO: 196 10*3/MM3 (ref 140–450)
PMV BLD AUTO: 9.8 FL (ref 6–12)
POTASSIUM SERPL-SCNC: 4 MMOL/L (ref 3.5–5.2)
PROT SERPL-MCNC: 7.4 G/DL (ref 6–8.5)
PSA SERPL-MCNC: 0.65 NG/ML (ref 0–4)
RBC # BLD AUTO: 5.04 10*6/MM3 (ref 4.14–5.8)
SODIUM SERPL-SCNC: 138 MMOL/L (ref 136–145)
TRIGL SERPL-MCNC: 164 MG/DL (ref 0–150)
VLDLC SERPL-MCNC: 30 MG/DL (ref 5–40)
WBC NRBC COR # BLD: 7.29 10*3/MM3 (ref 3.4–10.8)

## 2021-12-28 PROCEDURE — 80061 LIPID PANEL: CPT

## 2021-12-28 PROCEDURE — 80053 COMPREHEN METABOLIC PANEL: CPT

## 2021-12-28 PROCEDURE — G0103 PSA SCREENING: HCPCS

## 2021-12-28 PROCEDURE — 85025 COMPLETE CBC W/AUTO DIFF WBC: CPT

## 2021-12-28 PROCEDURE — 36415 COLL VENOUS BLD VENIPUNCTURE: CPT

## 2022-03-21 ENCOUNTER — LAB (OUTPATIENT)
Dept: LAB | Facility: HOSPITAL | Age: 62
End: 2022-03-21

## 2022-03-21 ENCOUNTER — OFFICE VISIT (OUTPATIENT)
Dept: ONCOLOGY | Facility: CLINIC | Age: 62
End: 2022-03-21

## 2022-03-21 VITALS
OXYGEN SATURATION: 97 % | TEMPERATURE: 97.1 F | RESPIRATION RATE: 16 BRPM | DIASTOLIC BLOOD PRESSURE: 81 MMHG | HEIGHT: 69 IN | WEIGHT: 220.4 LBS | BODY MASS INDEX: 32.64 KG/M2 | SYSTOLIC BLOOD PRESSURE: 122 MMHG | HEART RATE: 72 BPM

## 2022-03-21 DIAGNOSIS — R76.0 ANTICARDIOLIPIN ANTIBODY POSITIVE: ICD-10-CM

## 2022-03-21 DIAGNOSIS — I82.4Z1 ACUTE DEEP VEIN THROMBOSIS (DVT) OF DISTAL VEIN OF RIGHT LOWER EXTREMITY: ICD-10-CM

## 2022-03-21 DIAGNOSIS — R76.0 ANTICARDIOLIPIN ANTIBODY POSITIVE: Primary | ICD-10-CM

## 2022-03-21 DIAGNOSIS — I26.02 ACUTE SADDLE PULMONARY EMBOLISM WITH ACUTE COR PULMONALE: ICD-10-CM

## 2022-03-21 LAB
BASOPHILS # BLD AUTO: 0.04 10*3/MM3 (ref 0–0.2)
BASOPHILS NFR BLD AUTO: 0.7 % (ref 0–1.5)
DEPRECATED RDW RBC AUTO: 41.8 FL (ref 37–54)
EOSINOPHIL # BLD AUTO: 0.21 10*3/MM3 (ref 0–0.4)
EOSINOPHIL NFR BLD AUTO: 3.6 % (ref 0.3–6.2)
ERYTHROCYTE [DISTWIDTH] IN BLOOD BY AUTOMATED COUNT: 12.5 % (ref 12.3–15.4)
HCT VFR BLD AUTO: 45.6 % (ref 37.5–51)
HGB BLD-MCNC: 15 G/DL (ref 13–17.7)
IMM GRANULOCYTES # BLD AUTO: 0.02 10*3/MM3 (ref 0–0.05)
IMM GRANULOCYTES NFR BLD AUTO: 0.3 % (ref 0–0.5)
LYMPHOCYTES # BLD AUTO: 1.65 10*3/MM3 (ref 0.7–3.1)
LYMPHOCYTES NFR BLD AUTO: 28.2 % (ref 19.6–45.3)
MCH RBC QN AUTO: 30.1 PG (ref 26.6–33)
MCHC RBC AUTO-ENTMCNC: 32.9 G/DL (ref 31.5–35.7)
MCV RBC AUTO: 91.6 FL (ref 79–97)
MONOCYTES # BLD AUTO: 0.6 10*3/MM3 (ref 0.1–0.9)
MONOCYTES NFR BLD AUTO: 10.3 % (ref 5–12)
NEUTROPHILS NFR BLD AUTO: 3.33 10*3/MM3 (ref 1.7–7)
NEUTROPHILS NFR BLD AUTO: 56.9 % (ref 42.7–76)
NRBC BLD AUTO-RTO: 0 /100 WBC (ref 0–0.2)
PLATELET # BLD AUTO: 176 10*3/MM3 (ref 140–450)
PMV BLD AUTO: 9.4 FL (ref 6–12)
RBC # BLD AUTO: 4.98 10*6/MM3 (ref 4.14–5.8)
WBC NRBC COR # BLD: 5.85 10*3/MM3 (ref 3.4–10.8)

## 2022-03-21 PROCEDURE — 85025 COMPLETE CBC W/AUTO DIFF WBC: CPT

## 2022-03-21 PROCEDURE — 99214 OFFICE O/P EST MOD 30 MIN: CPT | Performed by: INTERNAL MEDICINE

## 2022-03-21 PROCEDURE — 36415 COLL VENOUS BLD VENIPUNCTURE: CPT

## 2022-03-21 RX ORDER — APIXABAN 5 MG/1
TABLET, FILM COATED ORAL
COMMUNITY
Start: 2021-12-15 | End: 2022-05-10 | Stop reason: CLARIF

## 2022-03-22 ENCOUNTER — TELEPHONE (OUTPATIENT)
Dept: CARDIOLOGY | Facility: CLINIC | Age: 62
End: 2022-03-22

## 2022-03-22 LAB
B2 GLYCOPROT1 IGA SER-ACNC: <9 GPI IGA UNITS (ref 0–25)
B2 GLYCOPROT1 IGG SER-ACNC: <9 GPI IGG UNITS (ref 0–20)
B2 GLYCOPROT1 IGM SER-ACNC: <9 GPI IGM UNITS (ref 0–32)
CARDIOLIPIN IGG SER IA-ACNC: <9 GPL U/ML (ref 0–14)
CARDIOLIPIN IGM SER IA-ACNC: 28 MPL U/ML (ref 0–12)

## 2022-03-22 NOTE — TELEPHONE ENCOUNTER
PA came in for patient for Eliquis  I called and spoke with Marimar his wife and she state Dr. Washington fills this medication for him.  She will call their office for the PA

## 2022-03-30 ENCOUNTER — TELEPHONE (OUTPATIENT)
Dept: ONCOLOGY | Facility: CLINIC | Age: 62
End: 2022-03-30

## 2022-03-30 NOTE — TELEPHONE ENCOUNTER
----- Message from Adonay Washington MD sent at 3/29/2022  5:21 PM EDT -----  CALL HIM HIS ANTICARDIOLIPIN ANTIBODY REMAINS POSITIVE HE MUST REMAIN IN HIS ANTICOAGULANT AT THE SAME DOSE

## 2022-03-30 NOTE — TELEPHONE ENCOUNTER
Relayed message below to Marimar. Pt wife v/u. Pt's wife has more questions and requested a call from Dr. Washington. Will pass along message. Allegra Cabrera RN

## 2022-04-25 ENCOUNTER — TELEPHONE (OUTPATIENT)
Dept: ONCOLOGY | Facility: CLINIC | Age: 62
End: 2022-04-25

## 2022-04-25 NOTE — TELEPHONE ENCOUNTER
Provider: DR DICKERSON  Caller: RODGER    Reason for Call: RODGER IS CALLING REGARDING A CT SCAN HE HAD IN NOV 2021.  THERE WAS A CODE THAT WAS INCORRECT, HE STATES HE DOESN'T NEED TO SPEAK TO BILLING.  HE STATES THE RADIOLOGIST GROUP TOLD HIM THAT THE OFFICE PUT IN WRONG CODE      PLEASE CALL TO DISCUSS

## 2022-04-28 ENCOUNTER — TELEPHONE (OUTPATIENT)
Dept: ONCOLOGY | Facility: CLINIC | Age: 62
End: 2022-04-28

## 2022-04-28 NOTE — TELEPHONE ENCOUNTER
Hub to read Left message for this patient to call me regarding a coding issue he is having on a bill.

## 2022-05-09 ENCOUNTER — TELEPHONE (OUTPATIENT)
Dept: ONCOLOGY | Facility: CLINIC | Age: 62
End: 2022-05-09

## 2022-05-10 ENCOUNTER — TELEPHONE (OUTPATIENT)
Dept: ONCOLOGY | Facility: CLINIC | Age: 62
End: 2022-05-10

## 2022-05-10 ENCOUNTER — TELEPHONE (OUTPATIENT)
Dept: ONCOLOGY | Facility: HOSPITAL | Age: 62
End: 2022-05-10

## 2022-05-10 NOTE — TELEPHONE ENCOUNTER
Pt tested positive for Covid on 5/9/2022/ He C/OI fatigue, chest congestion, aches, cough, sore throat and fever of 100.5. Pt is currently taking Xarelto, Mucinex and tylenol. These medications were reviewed with the pt and Dr. Washington. Paxlovid can increase the amount of Xarelto released into the body. Per Dr. Washington the pt will still take Xarelto with the Paxlovid even though it is contradicted. Prescription placed and pt V/U. Pt advised to call with any S/E of bleeding.

## 2022-05-11 ENCOUNTER — TELEPHONE (OUTPATIENT)
Dept: ONCOLOGY | Facility: CLINIC | Age: 62
End: 2022-05-11

## 2022-05-11 NOTE — TELEPHONE ENCOUNTER
Returned call to patient who wanted to know what to watch for in regards to the interaction between Xarelto and Paxlovid.  I explained to be aware of easy bruising, blood in his urine or stool, and nose bleeds.  Perform activities carefully in order to not fall or hit head.  The interaction between the two can cause increased bleeding.  Currently he has not experienced any problems, but will remain aware of possibilities.  He also needs a note for work that he is in treatment for Covid and also a letter to be reimbursed for [employer] tickets that he now will be unable to attend tomorrow night.  I explained that I would send this message to Dr. Washington nurse who can take care of the letter and note for work.  Patient v/u.

## 2022-05-12 NOTE — TELEPHONE ENCOUNTER
Letters sent. Patient also c/o back pain. Let him know this is a common symptom with COVID, but to contact our office if it persists once his other COVID symptoms resolve. Allegra Cabrera RN

## 2022-09-27 ENCOUNTER — APPOINTMENT (OUTPATIENT)
Dept: LAB | Facility: HOSPITAL | Age: 62
End: 2022-09-27

## 2022-10-24 ENCOUNTER — OFFICE VISIT (OUTPATIENT)
Dept: ONCOLOGY | Facility: CLINIC | Age: 62
End: 2022-10-24

## 2022-10-24 ENCOUNTER — LAB (OUTPATIENT)
Dept: LAB | Facility: HOSPITAL | Age: 62
End: 2022-10-24

## 2022-10-24 VITALS
HEIGHT: 69 IN | SYSTOLIC BLOOD PRESSURE: 120 MMHG | WEIGHT: 221.3 LBS | TEMPERATURE: 97.1 F | RESPIRATION RATE: 16 BRPM | DIASTOLIC BLOOD PRESSURE: 77 MMHG | HEART RATE: 58 BPM | BODY MASS INDEX: 32.78 KG/M2 | OXYGEN SATURATION: 98 %

## 2022-10-24 DIAGNOSIS — I82.4Z1 ACUTE DEEP VEIN THROMBOSIS (DVT) OF DISTAL VEIN OF RIGHT LOWER EXTREMITY: Primary | ICD-10-CM

## 2022-10-24 DIAGNOSIS — I26.02 ACUTE SADDLE PULMONARY EMBOLISM WITH ACUTE COR PULMONALE: ICD-10-CM

## 2022-10-24 DIAGNOSIS — E78.2 MIXED HYPERLIPIDEMIA: ICD-10-CM

## 2022-10-24 DIAGNOSIS — I82.4Z1 ACUTE DEEP VEIN THROMBOSIS (DVT) OF DISTAL VEIN OF RIGHT LOWER EXTREMITY: ICD-10-CM

## 2022-10-24 DIAGNOSIS — R76.0 ANTICARDIOLIPIN ANTIBODY POSITIVE: ICD-10-CM

## 2022-10-24 DIAGNOSIS — Z79.01 CURRENT USE OF LONG TERM ANTICOAGULATION: ICD-10-CM

## 2022-10-24 LAB
BASOPHILS # BLD AUTO: 0.04 10*3/MM3 (ref 0–0.2)
BASOPHILS NFR BLD AUTO: 0.5 % (ref 0–1.5)
CHOLEST SERPL-MCNC: 233 MG/DL (ref 0–200)
DEPRECATED RDW RBC AUTO: 41.1 FL (ref 37–54)
EOSINOPHIL # BLD AUTO: 0.17 10*3/MM3 (ref 0–0.4)
EOSINOPHIL NFR BLD AUTO: 2.3 % (ref 0.3–6.2)
ERYTHROCYTE [DISTWIDTH] IN BLOOD BY AUTOMATED COUNT: 12.5 % (ref 12.3–15.4)
HCT VFR BLD AUTO: 43 % (ref 37.5–51)
HDLC SERPL-MCNC: 43 MG/DL (ref 40–60)
HGB BLD-MCNC: 14.6 G/DL (ref 13–17.7)
IMM GRANULOCYTES # BLD AUTO: 0.03 10*3/MM3 (ref 0–0.05)
IMM GRANULOCYTES NFR BLD AUTO: 0.4 % (ref 0–0.5)
LDLC SERPL CALC-MCNC: 167 MG/DL (ref 0–100)
LDLC/HDLC SERPL: 3.84 {RATIO}
LYMPHOCYTES # BLD AUTO: 1.79 10*3/MM3 (ref 0.7–3.1)
LYMPHOCYTES NFR BLD AUTO: 24.2 % (ref 19.6–45.3)
MCH RBC QN AUTO: 30.4 PG (ref 26.6–33)
MCHC RBC AUTO-ENTMCNC: 34 G/DL (ref 31.5–35.7)
MCV RBC AUTO: 89.6 FL (ref 79–97)
MONOCYTES # BLD AUTO: 0.61 10*3/MM3 (ref 0.1–0.9)
MONOCYTES NFR BLD AUTO: 8.2 % (ref 5–12)
NEUTROPHILS NFR BLD AUTO: 4.77 10*3/MM3 (ref 1.7–7)
NEUTROPHILS NFR BLD AUTO: 64.4 % (ref 42.7–76)
NRBC BLD AUTO-RTO: 0 /100 WBC (ref 0–0.2)
PLATELET # BLD AUTO: 206 10*3/MM3 (ref 140–450)
PMV BLD AUTO: 9.2 FL (ref 6–12)
RBC # BLD AUTO: 4.8 10*6/MM3 (ref 4.14–5.8)
TRIGL SERPL-MCNC: 125 MG/DL (ref 0–150)
VLDLC SERPL-MCNC: 23 MG/DL (ref 5–40)
WBC NRBC COR # BLD: 7.41 10*3/MM3 (ref 3.4–10.8)

## 2022-10-24 PROCEDURE — 36415 COLL VENOUS BLD VENIPUNCTURE: CPT

## 2022-10-24 PROCEDURE — 80061 LIPID PANEL: CPT | Performed by: INTERNAL MEDICINE

## 2022-10-24 PROCEDURE — 85025 COMPLETE CBC W/AUTO DIFF WBC: CPT

## 2022-10-24 PROCEDURE — 99214 OFFICE O/P EST MOD 30 MIN: CPT | Performed by: INTERNAL MEDICINE

## 2022-10-24 NOTE — PROGRESS NOTES
Subjective           DURING THE VISIT WITH THE PATIENT TODAY , PATIENT HAD FACE MASK, MY MEDICAL ASSISTANT AND I  HAD PROPPER PROTECTIVE EQUIPMENT, AND I DID HAND HYGIENE WITH SOAP AND WATER BEFORE AND AFTER THE VISIT.     REASONS FOR FOLLOWUP:1  MASSIVE PULMONARY EMBOLISM WITH SEVERE PULMONARY HYPERTENSION REQUIRING CATHETER EMBOLECTOMY  AND SUBSEQUENT ANTICOAGULATION WITH ELIQUIS.  2. DVT RLE AFTER LONG DRIVE 20 HS TO [place].  3. POSITIVE ANTICARDIOLIPIN ANTIBODY IGM. NEGATIVE LUPUS ANTICOAGULANT.    HISTORY OF PRESENT ILLNESS:    On 10/24/2022 this 61-year-old white male who had massive pulmonary embolism returns to the office for followup in regard to his chronic anticoagulation. In the interval of time he has no trauma, abnormal bleeding and no new episodes of thrombosis. He has no pulmonary symptomatology, no cardiac symptomatology of any nature. He is doing any kind of exercise that he wants to do with no limitations. His appetite is tremendous. His bowel activity and urination are normal. No swelling in this lower extremities. Minimal discomfort still in the groin area where the device was placed to retrieve his blood clots from this lungs. He has not had any pseudoaneurysm formation. He has not had any clinical bleeding on his anticoagulant. We know that the patient has an elevated cholesterol and we will repeat these numbers today.          HEMATOLOGIC HISTORY:  Obviously the story of this patient is very typical. He drove to [place] on his own for 20 hours, not stopping. He developed pain in the calf of the right lower extremity that he called charley horse. He has shortness of breath presumed to be related to altitude sickness in [place] and upon returning to the [place] the shortness of breath remained ongoing and became worse. Since then he has had times that he has been able to do his workouts with no major difficulties and later on his workouts have been minimized given the shortness of breath  produced by minimal physical activity. A few days ago he had a new pain in the right calf close to the Achilles tendon. While mowing the grass yesterday he had syncope and very likely the explanation of all this is his pulmonary vasculature could not accommodate anymore blood clots and the flow through the left ventricle from the pulmonary veins was minimized producing syncope. The patient is alive by miracle. The procedure done by Dr. Cantu and stated above is more than dramatic in the description and tells us the amount of clots that this patient had. I pointed out to the patient that it is a miracle that he stayed alive. Many people go through this and they do not live to tell a story. Therefore, if somebody needs to have flowers and chocolates, it is Dr. Cantu and the emergency room team at [facility]. I think the quick transition and the care that this patient received is world quality and deserves to be credited.      The patient has no personal history of thrombophilia before. He has no family history of thrombophilia. He has not been taking anabolic steroids. His white count, hemoglobin and platelets are normal. His platelet count is minimally low because of platelet consumption associated with massive amount of clotting. Chemistry profile is normal. The CT angiogram from [facility] has been reviewed. Eventually we will need to ask the radiologist to amend the report because he mentioned something in the renal artery that probably is a type error. The description by Dr. Cantu has been reviewed.      The patient is already receiving Eliquis and I advised him that he will remain on Eliquis at least for a year and in my opinion probably for the rest of his life. Obviously we need to proceed with thrombophilia assessment but I think in my opinion the most likely factor that triggered all this was the long trip to [place]. He probably has had showers and showers and showers of clots  and emboli and his pulmonary circulation could not accommodate anymore clots. He ran out of physical space to put more clots and the circulation into the left side of the heart through the pulmonary veins ran out. I pointed out to him that it is like if the main water entrance of the water to the house has a clog, the rest of the house is not going to have any water. In any event the patient is up and running. He looks terrific. He has no clinical evidence of pulmonary hypertension on clinical examination and it is amazing that he is alive. I think the tolerance to all this is because he is a very fit individual who exercises all the time.      Obviously I am going to need to proceed with thrombophilia assessment that will include factor V Leiden mutation, prothrombin gene mutation, antithrombin III, lupus anticoagulant, anti-glycoprotein antibody profile, anticardiolipin antibody profile as well as protein C, protein S, fibrinogen, lipid profile as well as lupus anticoagulant. I will perform as well a serum protein electrophoresis and serum free light chains.      The patient has had screening colonoscopy in the past, prostate examination being negative normal. No family history of malignancy. No family history of thrombophilia. I need to see this patient in the office in a couple of weeks to go through the report of his laboratory testing. Eventually we will need to repeat a CT angiogram of the chest to be sure that all of his clots in the pulmonary circulation are clean and I am sure Dr. Cantu eventually will perform another echocardiogram to be sure that there is resolution of his pulmonary hypertension by this methodology.      I pointed out to the patient that he will need to stay away from trauma and I made the description in the common sense utilization at this point.      The patient likes to drink his beers. He does not get drunk but I pointed out that anticoagulants are not good friends of alcohol and  his wife will take care of this problem.      He raised the question if sex life will have anything to do with all these issues in regard to inability to perform and I pointed out to him that should not be an issue. I asked him to give himself a break until things get better in the next couple of weeks.    The patient had a history of going to [place] to take a car to his daughter, driving for 20 hours almost nonstop and he developed swelling and pain in the right lower extremity that continued intermittently after he culminated all the issues pertinent to above. My notes from the original consultation were very explicit and described the history of the present illness extremely well in my opinion. In any event, since the patient was discharged from the hospital, he has been getting better and better as time goes by. He has very occasional sensation of flip flop in his heart but no sensation of persistent tachyarrhythmia. No chest pain, no pleuritic pain, no hemoptysis, no cough, no shortness of breath. His appetite has been terrific. He has not had any diarrhea like he was having before and he has no abdominal pain, distention or jaundice. His urination is normal. He has had significant improvement but not complete resolution of the swelling in the right lower extremity with no pain. He has not had any clinical bleeding. He feels extremely well. He has not had any fever or infection. He reminded me that some of these events happened after he proceeded with his COVID vaccination weeks before the trip.    THROMBOPHILIA  CLINICAL AND LABORATORY REPORT:  DATE:    10 /4 /2021     DIAGNOSIS: MASSIVE PE REQUIRING EMBOLECTOMY. DVT RLE      KEY: P EQUAL TO POSITIVE  , N  EQUAL TO NEGATIVE. NA : NOT APLICABLE    PREPARED BY HAYLEY DICKERSON MD STAYS IN THE CHART PERMANENTLY      CLINICAL FACTORS:     OBESITY: ___N___     DIABETES: __N____.    IMMOBILITY: __N____.    SMOKING HISTORY : ______N______ PACK A DAY/ YEARS  ________    LONG TRIPS BY CAR OR AIRPLANE: __YES 20 HS DRIVE TO [place]______    RECENT SURGERY: ____N______   LOCATION: ________    TRAUMA: ___N_____    MEDICATIONS:   BIRTH CONTROL MEDICATIONS ___N_____ ANDROGENS _N______ ESTROGENS ___N_____ HEPARIN __N______ OTHERS ____N___    PREGNANCY:   ____N____.    FAMILY HISTORY OF BLOOD CLOTHS:    POSITIVE:   ________ NEGATIVE ___N____ on 3/21/2022 brother has pe after prostate cancer surgery    HEMATOLOGIC DISORDER:  POLYCYTHEMIA VERA ____N___ THROMBOCYTOSIS ___N___    DIC:___N____  TTP ___N_______    COLLAGEN VASCULAR DISEASE: _N____   DIAGNOSIS: ________________________.    VASCULITIS: TYPE AND LOCATION ____N___________ BIOPSY PROVEN:____________    CANCER DIAGNOSIS: __N_____    TYPE ________    CANCER SCREENING:  BREAST _______ COLON ___Y_____ LUNG ________ GENITOURINARY __Y_______   CT SCANS ___________ NORMAL ______ ABNORMAL ________    INDWELLING VASCULAR DEVICE:  ___N______. LOCATION _________    INFLAMMATORY BOWEL DISEASE: ULCERATIVE COLITIS ______N____   CHRON'S DISEASE ___________    RECENT COVID 19 INFECTION __N_____________.          LABORATORY:    FACTOR V LEIDEN MUTATION: POSITIVE   _____ NEGATIVE __Y___ HETEROZYGOUS ________ HOMOZYGOUS ________    PROTHROMBIN GENE MUTATION:  POSITIVE _____ NEGATIVE __Y___    ANTITHROMBIN III : NORMAL _____   ABNORMAL __Y____ QUANTIFICATION: __66_____    PROTEIN S ANTIGEN ____N______ PROTEIN S FUNCTIONAL _____N___ PROTEIN C ___N______     LUPUS ANTICOAGULANT: POSITIVE _____  NEGATIVE _N____. REPEATED  6 WEEKS LATER ______    ANTICARDIOLIPIN ANTIBODY PROFILE : POSITIVE ___Y___  NEGATIVE ______  IGG __N___ IGM _34____ REPEATED 6 WEEKS LATER _________    ANTI GLYCOPROTEIN ANTIBODY:  POSITIVE _____  NEGATIVE  __N_____   IGG _____ IGM _____ REPEATED 6 WEEKS LATER ________    ANTIPHOSPATIDIL SERINE ANTIBODY:  POSITIVE _____ NEGATIVE __N_____    SERUM PROTEIN ELECTROPHORESIS AND IMMUNOFIXATION: NO MONOCLONAL PROTEIN ___N_____  POSITIVE MONOCLONAL PROTEIN ______ TYPE _________    C REACTIVE PROTEIN HIGH SENSIBILITY: NORMAL _______ ABNORMAL :QUANTIFICATION ________    SEDIMENTATION RATE: _____ MM/H.    FIBRINOGEN LEVEL: _________ NORMAL __Y_____  ABNORMAL __________.    LIPID PROFILE:    CHOLESTEROL HIGH _____N_____  TRYGLYCERIDES HIGH  ___N_____    HEPARIN ASSOCIATED ANTIPLATELET ANTIBODY: __NA_______          Past Medical History:   Diagnosis Date   • DVT (deep venous thrombosis) ([facility])     RLE in the distal femoral, popliteal, posterior tibial, peroneal, and soleal.   • Pulmonary emboli (HCC)         Past Surgical History:   Procedure Laterality Date   • APPENDECTOMY     • CARDIAC CATHETERIZATION N/A 9/18/2021    Procedure: Percutaneous Manual Thrombectomy- INARI;  Surgeon: Enrike Cantu MD;  Location: [facility] CATH INVASIVE LOCATION;  Service: Cardiovascular;  Laterality: N/A;   • CARDIAC CATHETERIZATION N/A 9/18/2021    Procedure: Right Heart Cath;  Surgeon: Enrike Cantu MD;  Location:  [facility] CATH INVASIVE LOCATION;  Service: Cardiovascular;  Laterality: N/A;   • COLONOSCOPY      negative normal   • EMBOLECTOMY     • INTERVENTIONAL RADIOLOGY PROCEDURE Bilateral 9/18/2021    Procedure: Pumonary angiography -Bilateral;  Surgeon: Enrike Cantu MD;  Location:  [facility] CATH INVASIVE LOCATION;  Service: Cardiovascular;  Laterality: Bilateral;   • TONSILLECTOMY          Current Outpatient Medications on File Prior to Visit   Medication Sig Dispense Refill   • escitalopram (LEXAPRO) 20 MG tablet Take 20 mg by mouth Daily.     • loratadine (CLARITIN) 10 MG tablet Take 10 mg by mouth Daily.     • rivaroxaban (Xarelto) 20 MG tablet TAKE 1 TABLET BY MOUTH EVERY DAY 90 tablet 2   • Triamcinolone Acetonide (NASACORT) 55 MCG/ACT nasal inhaler 2 sprays into the nostril(s) as directed by provider Daily.       No current facility-administered medications on file prior to visit.        ALLERGIES:    Allergies   Allergen Reactions   • Penicillins  "Rash        Social History     Socioeconomic History   • Marital status:      Spouse name: Marimar   Tobacco Use   • Smoking status: Never   • Smokeless tobacco: Never   • Tobacco comments:     CAFFEINE -  2 CUPS COFFEE DAILY    Vaping Use   • Vaping Use: Never used   Substance and Sexual Activity   • Alcohol use: Not Currently     Alcohol/week: 6.0 standard drinks     Types: 6 Cans of beer per week   • Drug use: Never   • Sexual activity: Defer        Family History   Problem Relation Age of Onset   • Hypertension Mother    • Hypertension Father    • Suicidality Brother    • Autism Son    • Hypertension Brother           Objective     Vitals:    10/24/22 1547   BP: 120/77   Pulse: 58   Resp: 16   Temp: 97.1 °F (36.2 °C)   TempSrc: Temporal   SpO2: 98%   Weight: 100 kg (221 lb 4.8 oz)   Height: 175.3 cm (69.02\")   PainSc: 0-No pain     Current Status 10/24/2022   ECOG score 0       Physical Exam         GENERAL:  Well-developed, well-nourished  Patient  in no acute distress.   SKIN:  Warm, dry ,NO purpura ,no rash.  HEENT:  Pupils were equal and reactive to light and accomodation, conjunctivae noninjected, normal extraocular movements, normal visual acuity.   NECK:  Supple with good range of motion; no thyromegaly , no JVD or bruits,.No carotid artery pain, no carotid abnormal pulsation   LYMPHATICS:  No cervical, NO supraclavicular, NO axillary, NO inguinal adenopathies.  CARDIAC   normal rate , regular rhythm, without murmur,NO rubs NO S3 NO S4   LUNGS: normal breath sounds bilateral, no wheezing, NO rhonchi, NO crackles ,NO rubs.  VASCULAR VENOUS: no cyanosis, NO collateral circulation, NO varicosities, NO edema, NO palpable cords, NO pain,NO erythema, NO pigmentation of the skin.  ABDOMEN:  Soft, NO pain,no hepatomegaly, no splenomegaly,no masses, no ascites, no collateral circulation,no distention.  EXTREMITIES  AND SPINE:  No clubbing, no cyanosis ,no deformities , no pain .No kyphosis,  no pain in " spine, no pain in ribs , no pain in pelvic bone.  NEUROLOGICAL:  Patient was awake, alert, oriented to time, person and place.      RECENT LABS:  Hematology WBC   Date Value Ref Range Status   10/24/2022 7.41 3.40 - 10.80 10*3/mm3 Final     RBC   Date Value Ref Range Status   10/24/2022 4.80 4.14 - 5.80 10*6/mm3 Final     Hemoglobin   Date Value Ref Range Status   10/24/2022 14.6 13.0 - 17.7 g/dL Final     Hematocrit   Date Value Ref Range Status   10/24/2022 43.0 37.5 - 51.0 % Final     Platelets   Date Value Ref Range Status   10/24/2022 206 140 - 450 10*3/mm3 Final       CBC:    WBC   Date Value Ref Range Status   10/24/2022 7.41 3.40 - 10.80 10*3/mm3 Final     RBC   Date Value Ref Range Status   10/24/2022 4.80 4.14 - 5.80 10*6/mm3 Final     Hemoglobin   Date Value Ref Range Status   10/24/2022 14.6 13.0 - 17.7 g/dL Final     Hematocrit   Date Value Ref Range Status   10/24/2022 43.0 37.5 - 51.0 % Final     MCV   Date Value Ref Range Status   10/24/2022 89.6 79.0 - 97.0 fL Final     MCH   Date Value Ref Range Status   10/24/2022 30.4 26.6 - 33.0 pg Final     MCHC   Date Value Ref Range Status   10/24/2022 34.0 31.5 - 35.7 g/dL Final     RDW   Date Value Ref Range Status   10/24/2022 12.5 12.3 - 15.4 % Final     RDW-SD   Date Value Ref Range Status   10/24/2022 41.1 37.0 - 54.0 fl Final     MPV   Date Value Ref Range Status   10/24/2022 9.2 6.0 - 12.0 fL Final     Platelets   Date Value Ref Range Status   10/24/2022 206 140 - 450 10*3/mm3 Final     Neutrophil %   Date Value Ref Range Status   10/24/2022 64.4 42.7 - 76.0 % Final     Lymphocyte %   Date Value Ref Range Status   10/24/2022 24.2 19.6 - 45.3 % Final     Monocyte %   Date Value Ref Range Status   10/24/2022 8.2 5.0 - 12.0 % Final     Eosinophil %   Date Value Ref Range Status   10/24/2022 2.3 0.3 - 6.2 % Final     Basophil %   Date Value Ref Range Status   10/24/2022 0.5 0.0 - 1.5 % Final     Immature Grans %   Date Value Ref Range Status   10/24/2022  0.4 0.0 - 0.5 % Final     Neutrophils, Absolute   Date Value Ref Range Status   10/24/2022 4.77 1.70 - 7.00 10*3/mm3 Final     Lymphocytes, Absolute   Date Value Ref Range Status   10/24/2022 1.79 0.70 - 3.10 10*3/mm3 Final     Monocytes, Absolute   Date Value Ref Range Status   10/24/2022 0.61 0.10 - 0.90 10*3/mm3 Final     Eosinophils, Absolute   Date Value Ref Range Status   10/24/2022 0.17 0.00 - 0.40 10*3/mm3 Final     Basophils, Absolute   Date Value Ref Range Status   10/24/2022 0.04 0.00 - 0.20 10*3/mm3 Final     Immature Grans, Absolute   Date Value Ref Range Status   10/24/2022 0.03 0.00 - 0.05 10*3/mm3 Final     nRBC   Date Value Ref Range Status   10/24/2022 0.0 0.0 - 0.2 /100 WBC Final        CMP:    Glucose   Date Value Ref Range Status   12/28/2021 85 65 - 99 mg/dL Final     BUN   Date Value Ref Range Status   12/28/2021 21 8 - 23 mg/dL Final     Creatinine   Date Value Ref Range Status   12/28/2021 0.95 0.76 - 1.27 mg/dL Final     Sodium   Date Value Ref Range Status   12/28/2021 138 136 - 145 mmol/L Final     Potassium   Date Value Ref Range Status   12/28/2021 4.0 3.5 - 5.2 mmol/L Final     Chloride   Date Value Ref Range Status   12/28/2021 101 98 - 107 mmol/L Final     CO2   Date Value Ref Range Status   12/28/2021 27.3 22.0 - 29.0 mmol/L Final     Calcium   Date Value Ref Range Status   12/28/2021 9.3 8.6 - 10.5 mg/dL Final     Total Protein   Date Value Ref Range Status   12/28/2021 7.4 6.0 - 8.5 g/dL Final     Albumin   Date Value Ref Range Status   12/28/2021 4.50 3.50 - 5.20 g/dL Final     ALT (SGPT)   Date Value Ref Range Status   12/28/2021 32 1 - 41 U/L Final     AST (SGOT)   Date Value Ref Range Status   12/28/2021 36 1 - 40 U/L Final     Alkaline Phosphatase   Date Value Ref Range Status   12/28/2021 93 39 - 117 U/L Final     Total Bilirubin   Date Value Ref Range Status   12/28/2021 0.6 0.0 - 1.2 mg/dL Final     Globulin   Date Value Ref Range Status   12/28/2021 2.9 gm/dL Final      A/G Ratio   Date Value Ref Range Status   12/28/2021 1.6 g/dL Final     BUN/Creatinine Ratio   Date Value Ref Range Status   12/28/2021 22.1 7.0 - 25.0 Final     Anion Gap   Date Value Ref Range Status   12/28/2021 9.7 5.0 - 15.0 mmol/L Final           Recent imaging:    No results found.     Assessment & Plan     Diagnoses and all orders for this visit:    1. Acute deep vein thrombosis (DVT) of distal vein of right lower extremity (HCC) (Primary)  -     CBC & Differential; Future  -     Comprehensive Metabolic Panel; Future  -     Anticardiolipin Antibody, IgG / M, Qn; Future  -     Lupus Anticoagulant Reflex; Future  -     Beta-2 Glycoprotein Antibodies; Future  -     Lipid Panel    2. Acute saddle pulmonary embolism with acute cor pulmonale (HCC)  -     CBC & Differential; Future  -     Comprehensive Metabolic Panel; Future  -     Anticardiolipin Antibody, IgG / M, Qn; Future  -     Lupus Anticoagulant Reflex; Future  -     Beta-2 Glycoprotein Antibodies; Future  -     Lipid Panel    3. Current use of long term anticoagulation    4. Anticardiolipin antibody positive    5. Mixed hyperlipidemia         The patient was brought back to the office on 10/04/2021. Since the hospital stay he has been terrific at home. He has gained back his mobility and his ability to function with no limitations. He has had near complete resolution of the swelling and pain in the right calf and right lower extremity and he has not had any shortness of breath, palpitations, pleuritic pain, cough, hemoptysis. He occasional has the sensation of the heart that flip flops and lasts for 1 or 2 seconds in absence of any pain.     He has not had any clinical bleeding on the Eliquis.     His clinical examination today besides minimal residual swelling in the right lower extremity shows no other abnormalities and there is no evidence clinically of pulmonary hypertension.     The thrombophilia labs have documented the only abnormality, an  anticardiolipin antibody IgM that was marginally positive. The rest of the studies as posted above in the hematological history were negative. This leads me to conclude that the most likely factor that triggered the clot in this patient was the positive anticardiolipin antibody but most importantly the long drive to [place], 20 hours in a car. The patient stopped on 2 occasions for gasoline and food and that was the rest of the story. He developed swelling on the trip in the right lower extremity and he kept having swelling and pain in the calf for many weeks after that having very likely frequent showers of emboli that filled up the pulmonary circulation and culminated with his syncopal episode while mowing grass.     The patient's clinical examination today otherwise is unrevealing. He feels great. He also has resolution of the thrombocytopenia that he had in the hospital that is probably associated with excessive platelet consumption but massive amount of clots. The platelet count, hemoglobin and white count today are completely normal.     My recommendations for this patient are as follows:   1. He will remain on his Eliquis for the time being and I have recommended to the patient to remain on this medicine for the rest of his life. I feel afraid of this patient having another episode and I feel afraid of this patient having any other issues that would culminate with more complications or problems associated with this process.   2. I advised the patient to have a repeat CT angiogram of the chest and Doppler study of the right lower extremity in 5 weeks and to repeat the level of antithrombin III as well as the anticardiolipin antibody in 5 weeks. I would like to review him back in 6 weeks.   3. I advised him to use elastic support in the right lower extremity if possible all the time. He does not need to use this at nighttime.   4. I advised him against any potential for trauma to minimize bleeding associated  with anticoagulant use.   5. I advised him to resume his beer drinking that he loves to do. I asked him that he needs to do this even with more than moderation. Maybe a beer here and there and he is not allowed to get tipsy. He is aware that too much alcohol and anticoagulants are not good friends from the point of view of falling, trauma and circumstances of that nature.     The patient raised the question about his sex life and I told him that I think he can resume this according to his circumstances with no significant limitations.     I discussed all these facts with the patient. I provided him copies of the laboratory testing and I discussed this with the patient’s wife.     They raised the question if I do believe that the COVID vaccination had something to do with this. He received this a few weeks before the trip to [place]. Maybe that was a triggering factor for anticardiolipin antibodies. I am not sure about the answer that I need to do in this regard. He received Pfizer vaccination. I advised him though in the home run to avoid the use of a 2nd dose on himself at this point until we clarify and remove more smoke out of the air.  I reviewed the patient on 11/15/2021. Since the previous visit he has had complete resolution of the symptoms pertinent to his pulmonary embolism. He has had a normal clinical examination today. He has had minimal leftover edema in the right lower extremity that is barely visible to my eyes today.    The CT angiogram of the chest shows complete resolution of his massive amount of pulmonary embolism and there is no evidence of pulmonary hypertension.     The Doppler study of the lower extremity documents a subacute clot in the popliteal vein on the right lower extremity. No other sites of clots.     The patient was sent back to the lab today to obtain his laboratory testing that includes a CBC, anticardiolipin antibody and glycoprotein antibodies as well as antithrombin-III.           I had a lengthy conversation with the patient today in discussion with him and his wife present in the room in regard issues pertinent to anticoagulants. I strongly believe that this patient needs to remain on anticoagulant for the rest of his life given the gravity of the clinical picture that he presented with the first time. Maybe in the future it will be amenable for us to decrease the degree of anticoagulant utilization. His insurance company has already notified him that for next year they will not pay for Eliquis that will obligate us to make the transition from Eliquis to Xarelto. I do not have any difficulties with this. The patient has tablets of Eliquis to last for another 3-4 weeks. I asked him to go ahead and continue taking them and once that he is done with the last dose of Eliquis he will initiate Xarelto at the dose of 20 mg once a day.     I pointed out to the patient that he needs to wear elastic support in his right lower extremity no matter what the situation is. I asked him to stay away from extensive amount of alcohol consumption and minimize trauma.     The patient returned to the office on 03/21/2022. Since the previous visit he has been feeling terrific. He is back to his normal exercise activity with no limitations whatsoever. He is able to do anything that he needs to do physically wise and he has no sensation of shortness of breath, chest pain or palpitation. There is no postphlebitic syndrome in the right lower extremity. The patient remains taking his Eliquis in a normal schedule with no clinical bleeding.     The patient raised the question and his wife sent the message in regard to the correlation between COVID testing and blood clots. Typically the association is related to [employer] vaccine, not related to the Moderna or Pfizer vaccine. Astra [employer] that is not utilized in [place] also has some of those concerns in regard to clots. Mostly happens in women. Again my  concern in regard to his thrombophilia is based more in regard to the time of traveling 20 hours in a car and the subsequent self neglect in regard to recognizing the symptoms of shortness of breath that produce many showers of pulmonary emboli that ended up with severe pulmonary hypertension and syncope. He required removal of clots and thrombolytic therapy. He is alive because of a miracle in my opinion and his neighbors who pay attention to his actions outside on the day that the issue happened.    He raised the question in regard for how long he needs to be anticoagulated. I told him that given the intensity of his process in my opinion is that probably for life but he does not want to listen to this answer yet. My approach will be eventually to do a D-dimer that entire literature has been documented to be predictive of future events if the test is abnormal before any anticoagulation is discontinued.     I advised him again to avoid trauma at any cost remaining on anticoagulation and I asked him to return to see us back in 6 months.     If he wishes to have a booster shot for COVID he is welcome to do that either receiving the Pfizer vaccine or Moderna vaccine that is my recommendation.     I advised him also to remain physically active like he is. He is a fan of exercise and I asked him to continue to work on this.    Other than that no other significant recommendation for him at this point. He brought up to me the story that his brother had prostate cancer surgery recently and he ended up with a pulmonary embolism. We have done the thrombophilia assessment for family and all of the testing posted above was negative.   On 10/24/2022 the patient was further reviewed. On clinical examination he is completely normal with no symptoms or signs of any occlusive venous disease or complications from his dramatic pulmonary embolism that he was safe from before with medicines and procedures. The patient is back to  baseline. He is doing exercise and he is staying away from red meat completely. He has not encountered any problems with his anticoagulant that he takes religiously on schedule with no clinical bleeding. He also questions if he needs to remain on the anticoagulant continuously and I mentioned to him that given the severity of his process he probably will need to remain on the medicine for the time being.     As we know we have to test on him anticardiolipin antibody and anti-glycoprotein antibodies and lupus anticoagulant periodically and we have a set of these studies pending today. A lipid profile that was done by the end of last year also documented significant elevation of cholesterol and triglycerides and I discussed with him and his wife the need to have a repeat lipid test today.     The patient's brother has a terrible lipid profile. He is hypertensive. He has had thrombophilia as well before.     My advice to the patient is as follows:   1. He will remain on anticoagulant for the time being.   2. He will avoid trauma at any cost to minimize bleeding.   3. He will return in 6 months for a repeat CBC, CMP, lipid profile, anticardiolipin antibody profile, lupus anticoagulant.   4. The patient was advised in regard to the phone call that he will get once that we get the report of the laboratory testing requested above.     I discussed these facts with the patient present in the room. He requested a conversation with his wife via FaceTime that we performed over 10 minutes with her giving her explanations about why he needs to remain on anticoagulant and the need for him to protect himself from trauma.     As usual the patient loves his beer and drinks his beer and I pointed out to him that moderation is the rule of the day, 2-3 beers together would not affect too much but more than that can have terrible consequences of his health not only in the future but also in the present with the potential for car  accidents, personal falls and many other things that can happen under those circumstances. He is aware of that and he will try to limit this issue in the best way that he can.      ·

## 2022-10-25 ENCOUNTER — TELEPHONE (OUTPATIENT)
Dept: ONCOLOGY | Facility: CLINIC | Age: 62
End: 2022-10-25

## 2022-10-25 LAB
CARDIOLIPIN IGG SER IA-ACNC: <9 GPL U/ML (ref 0–14)
CARDIOLIPIN IGM SER IA-ACNC: 23 MPL U/ML (ref 0–12)

## 2022-10-25 NOTE — TELEPHONE ENCOUNTER
----- Message from Adonay Washington MD sent at 10/25/2022 12:27 PM EDT -----  CALL HIS CHOLESTEROL IS HIGH SEND THIS TO HIS PRIMARY CARE MD, I THINK HE SHOULD BE ON CHOLESTEROL MED

## 2022-10-26 ENCOUNTER — TELEPHONE (OUTPATIENT)
Dept: ONCOLOGY | Facility: CLINIC | Age: 62
End: 2022-10-26

## 2022-10-27 ENCOUNTER — DOCUMENTATION (OUTPATIENT)
Dept: ONCOLOGY | Facility: CLINIC | Age: 62
End: 2022-10-27

## 2022-10-27 LAB
DRVVT SCREEN TO CONFIRM RATIO: 1.3 RATIO (ref 0.8–1.2)
LA 2 SCREEN W REFLEX-IMP: ABNORMAL
MIXING DRVVT: 43.5 SEC (ref 0–40.4)
SCREEN APTT: 33 SEC (ref 0–51.9)
SCREEN DRVVT: 48.5 SEC (ref 0–47)

## 2022-10-27 NOTE — PROGRESS NOTES
Staff message rec from Priscilla PHAN Clinical RN for Dr Washington-Pt mentioned the price of his Anticoagulation. He is on Xarelto and his copay for a 90 day supply is $240.    I attempted to run a test claim through [facility] and it returned with refill too soon until 11/5/22 as he rec a 90 day fill on 8/29/22 at [facility] ([phone]).    Pt looks to have a commercial plan. If he has not already used the copay card-he would be able to now. Thd copay card could reduce his cost to $10 but the Xarelto copay card does have a cap of $200 so anything over $200 would be pts responsibility. I can call pt to discuss.    Allegra Cabrera, RN sent to Hui Whitman, Mr. Gupta mentioned the dickson of his anticoagulation. He was wanting to come off, but can't. Reports he hasn't worked with [facility] yet. Could you see about finding a lower price. He is currently paying 240 for a 90 day supply.     Thanks!   Priscilla           Previous Messages     ----- Message -----   From: Adonay Washington MD   Sent: 10/26/2022   9:01 AM EDT   To: Allegra Cabrera RN     Call his anticardiolipin antibody still present he must remain on anticoagulants      Hui Whitman  Specialty Pharmacy Technician

## 2022-10-28 ENCOUNTER — APPOINTMENT (OUTPATIENT)
Dept: LAB | Facility: HOSPITAL | Age: 62
End: 2022-10-28

## 2022-10-28 ENCOUNTER — TRANSCRIBE ORDERS (OUTPATIENT)
Dept: ADMINISTRATIVE | Facility: HOSPITAL | Age: 62
End: 2022-10-28

## 2022-10-28 ENCOUNTER — OFFICE VISIT (OUTPATIENT)
Dept: CARDIOLOGY | Facility: CLINIC | Age: 62
End: 2022-10-28

## 2022-10-28 VITALS
HEIGHT: 69 IN | WEIGHT: 219 LBS | SYSTOLIC BLOOD PRESSURE: 112 MMHG | DIASTOLIC BLOOD PRESSURE: 60 MMHG | HEART RATE: 53 BPM | BODY MASS INDEX: 32.44 KG/M2

## 2022-10-28 DIAGNOSIS — Z13.9 DUE FOR SCREENING: Primary | ICD-10-CM

## 2022-10-28 DIAGNOSIS — Z86.711 HISTORY OF PULMONARY EMBOLUS (PE): Primary | ICD-10-CM

## 2022-10-28 DIAGNOSIS — R76.0 ANTIPHOSPHOLIPID ANTIBODY POSITIVE: ICD-10-CM

## 2022-10-28 DIAGNOSIS — I51.7 RIGHT VENTRICULAR DILATION: ICD-10-CM

## 2022-10-28 PROCEDURE — 99214 OFFICE O/P EST MOD 30 MIN: CPT | Performed by: NURSE PRACTITIONER

## 2022-10-28 PROCEDURE — 93000 ELECTROCARDIOGRAM COMPLETE: CPT | Performed by: NURSE PRACTITIONER

## 2022-10-28 NOTE — PROGRESS NOTES
Date of Office Visit: 10/28/22  Encounter Provider: LINA Corey  Place of Service: [facility] CARDIOLOGY  Patient Name: Christopher Gupta  :1960    Chief Complaint   Patient presents with   • Acute saddle pulmonary embolism with acute cor pulmonale (H   • Right ventricular dilation   • Deep Vein Thrombosis   • Follow-up   :     HPI: Christopher Gupta is a 61 y.o. male  with hyperlipidemia, antiphospholipid antibody, anxiety, saddle pulmonary embolism status post thrombectomy 2021,  and seasonal allergies. He is followed by Dr. Cantu. I will visit with him in follow up today and have reviewed his medical record. He drove 17 hours to [place] to drop his daughter's car off to her. She recently started graduate school there. He had some progressive shortness of breath for approximately 2 to 3 weeks after he returned. He presented with shortness of breath, oxygen saturation 90%, normal sinus rhythm, D-dimer greater than 10.0 and RV dilation on echocardiogram with RVSP measuring 77 mmHg. He had right heart catheterization and pulmonary angiogram bilaterally with thrombectomy only of the right and left pulmonary artery. He was also noted to have acute right lower extremity DVT. Hematology was consulted for further evaluation of underlying blood disorder. He transition from heparin IV to Eliquis. He was later switched to Xarelto.    He presents today for reassessment doing well and has changed his diet to try and help lower his lipids.  [facility] strengthening training [facility]  He has no chest pain or shortness of breath.  He had lightheadedness with standing and occasionally lightheadedness after exercise but no near-syncope or syncope.  This is not frequent.  He has no blood in the urine or stool.              Allergies   Allergen Reactions   • Penicillins Rash           Family and social history reviewed.     ROS  All other systems were  "reviewed and are negative          Objective:     Vitals:    10/28/22 1034   BP: 112/60   BP Location: Left arm   Patient Position: Sitting   Pulse: 53   Weight: 99.3 kg (219 lb)   Height: 175.3 cm (69\")     Body mass index is 32.34 kg/m².    PHYSICAL EXAM:  Pulmonary:      Effort: Pulmonary effort is normal.      Breath sounds: Normal breath sounds.   Cardiovascular:      Normal rate. Regular rhythm.           ECG 12 Lead    Date/Time: 10/28/2022 10:42 AM  Performed by: Radha Szymanski APRN  Authorized by: Radha Szymanski APRN   Comparison: compared with previous ECG   Similar to previous ECG  Rhythm: sinus rhythm  Rate: bradycardic  T inversion: III  T flattening: aVF  QRS axis: normal  Comments: No change from prior              Current Outpatient Medications   Medication Sig Dispense Refill   • escitalopram (LEXAPRO) 20 MG tablet Take 20 mg by mouth Daily.     • loratadine (CLARITIN) 10 MG tablet Take 10 mg by mouth Daily.     • rivaroxaban (Xarelto) 20 MG tablet TAKE 1 TABLET BY MOUTH EVERY DAY 90 tablet 2   • Triamcinolone Acetonide (NASACORT) 55 MCG/ACT nasal inhaler 2 sprays into the nostril(s) as directed by provider Daily.       No current facility-administered medications for this visit.     Assessment:       Diagnosis Plan   1. History of pulmonary embolus (PE)  ECG 12 Lead      2. Right ventricular dilation  ECG 12 Lead      3. Antiphospholipid antibody positive  ECG 12 Lead           Orders Placed This Encounter   Procedures   • ECG 12 Lead     This order was created via procedure documentation     Order Specific Question:   Release to patient     Answer:   Routine Release         Plan:         1. 60-year-old gentleman with bilateral saddle September 2021 status post bilateral thrombectomy maintained on Xarleto. His LV and RV completely recovered 10/2021. he is also following with hematology, Dr. Washington  2.  Antiphospholipid antibody positive-he is followed by hematology  3.  Hyperlipidemia followed by " PCP.  I calculated his 10-year risk for atherosclerotic cardiovascular disease in his risk is 8.8% compared to age-matched control of 5.7%.  Statin therapy would be recommended given this.  He is adjusting his diet and already exercising routinely.  I have recommended vascular screening including cardiac calcium score for further risk stratification  4.  History of anxiety  5.  History of bradycardia-he is historically been asymptomatic.  He is a very active gentleman so this poses no concern at this time  6.     Right lower extremity DVT 2021      Follow-up in 1 year.Call with questions or concerns.             It has been a pleasure to participate in this patient's care.      Thank you,  LINA Corey      **I used Dragon to dictate this note:**

## 2022-12-12 ENCOUNTER — HOSPITAL ENCOUNTER (OUTPATIENT)
Dept: CARDIOLOGY | Facility: HOSPITAL | Age: 62
Discharge: HOME | End: 2022-12-12

## 2022-12-12 ENCOUNTER — TELEPHONE (OUTPATIENT)
Dept: CARDIOLOGY | Facility: CLINIC | Age: 62
End: 2022-12-12

## 2022-12-12 ENCOUNTER — HOSPITAL ENCOUNTER (OUTPATIENT)
Dept: CT IMAGING | Facility: HOSPITAL | Age: 62
Discharge: HOME | End: 2022-12-12

## 2022-12-12 DIAGNOSIS — Z13.9 DUE FOR SCREENING: ICD-10-CM

## 2022-12-12 LAB
BH CV XLRA MEAS - MID AO DIAM: 2.1 CM
BH CV XLRA MEAS - PAD LEFT ABI PT: 1.15
BH CV XLRA MEAS - PAD LEFT ARM: 120 MMHG
BH CV XLRA MEAS - PAD LEFT LEG PT: 140 MMHG
BH CV XLRA MEAS - PAD RIGHT ABI PT: 1.17
BH CV XLRA MEAS - PAD RIGHT ARM: 122 MMHG
BH CV XLRA MEAS - PAD RIGHT LEG PT: 143 MMHG
BH CV XLRA MEAS LEFT DIST CCA EDV: 13.2 CM/SEC
BH CV XLRA MEAS LEFT DIST CCA PSV: 67.2 CM/SEC
BH CV XLRA MEAS LEFT ICA/CCA RATIO: 0.9
BH CV XLRA MEAS LEFT MID CCA PSV: 67 CM/SEC
BH CV XLRA MEAS LEFT MID ICA PSV: 59 CM/SEC
BH CV XLRA MEAS LEFT PROX ICA EDV: -26.3 CM/SEC
BH CV XLRA MEAS LEFT PROX ICA PSV: -59.3 CM/SEC
BH CV XLRA MEAS RIGHT DIST CCA EDV: 13.7 CM/SEC
BH CV XLRA MEAS RIGHT DIST CCA PSV: 74.6 CM/SEC
BH CV XLRA MEAS RIGHT ICA/CCA RATIO: 0.9
BH CV XLRA MEAS RIGHT MID CCA PSV: 75 CM/SEC
BH CV XLRA MEAS RIGHT MID ICA PSV: 67 CM/SEC
BH CV XLRA MEAS RIGHT PROX ICA EDV: -20.6 CM/SEC
BH CV XLRA MEAS RIGHT PROX ICA PSV: -66.7 CM/SEC
MAXIMAL PREDICTED HEART RATE: 159 BPM
STRESS TARGET HR: 135 BPM

## 2022-12-12 PROCEDURE — 93799 UNLISTED CV SVC/PROCEDURE: CPT

## 2022-12-12 PROCEDURE — 75571 CT HRT W/O DYE W/CA TEST: CPT

## 2022-12-12 NOTE — TELEPHONE ENCOUNTER
I spoke with Christopher Gupta and updated pt on results/recommendations from provider.  Pt verbalized understanding and has no further questions at this time.    Thank you,    Madyson Alcantara, RN  Triage [facility]  12/12/22 14:09 EST

## 2022-12-12 NOTE — TELEPHONE ENCOUNTER
----- Message from Enrike Cantu MD sent at 12/12/2022  1:40 PM EST -----  You ordered these test but he has a calcium score 0 and no evidence of any vascular disease there is no benefit to putting this mason on a statin I would not do this  ----- Message -----  From: Lemuel Lawson MD  Sent: 12/12/2022  12:53 PM EST  To: Enrike Cantu MD

## 2022-12-12 NOTE — TELEPHONE ENCOUNTER
Please inform patient calcium score is 0 and he had normal vascular screening.  For this reason I would not recommend statin therapy and Dr. Cantu agrees.

## 2023-04-10 ENCOUNTER — TELEPHONE (OUTPATIENT)
Dept: ONCOLOGY | Facility: CLINIC | Age: 63
End: 2023-04-10
Payer: COMMERCIAL

## 2023-04-10 NOTE — TELEPHONE ENCOUNTER
"Caller: Christopher Gupta \"Donald\"    Relationship to patient: Self    Best call back number: [phone]    Patient is needing:  PT IS NEEDING TO R/S 4-25-23 LAB AND F/U APPT TO ANY DAY BUT Thursday AND AROUND 2-3 PM.  "

## 2023-06-09 ENCOUNTER — LAB (OUTPATIENT)
Dept: LAB | Facility: HOSPITAL | Age: 63
End: 2023-06-09
Payer: COMMERCIAL

## 2023-06-09 ENCOUNTER — OFFICE VISIT (OUTPATIENT)
Dept: ONCOLOGY | Facility: CLINIC | Age: 63
End: 2023-06-09
Payer: COMMERCIAL

## 2023-06-09 VITALS
HEIGHT: 69 IN | HEART RATE: 55 BPM | WEIGHT: 221.9 LBS | BODY MASS INDEX: 32.87 KG/M2 | TEMPERATURE: 97.8 F | SYSTOLIC BLOOD PRESSURE: 107 MMHG | OXYGEN SATURATION: 98 % | DIASTOLIC BLOOD PRESSURE: 74 MMHG

## 2023-06-09 DIAGNOSIS — R76.0 ANTICARDIOLIPIN ANTIBODY POSITIVE: Primary | ICD-10-CM

## 2023-06-09 DIAGNOSIS — I26.02 ACUTE SADDLE PULMONARY EMBOLISM WITH ACUTE COR PULMONALE: ICD-10-CM

## 2023-06-09 DIAGNOSIS — I82.4Z1 ACUTE DEEP VEIN THROMBOSIS (DVT) OF DISTAL VEIN OF RIGHT LOWER EXTREMITY: ICD-10-CM

## 2023-06-09 DIAGNOSIS — Z79.01 CURRENT USE OF LONG TERM ANTICOAGULATION: ICD-10-CM

## 2023-06-09 LAB
ALBUMIN SERPL-MCNC: 4.3 G/DL (ref 3.5–5.2)
ALBUMIN/GLOB SERPL: 1.2 G/DL (ref 1.1–2.4)
ALP SERPL-CCNC: 95 U/L (ref 38–116)
ALT SERPL W P-5'-P-CCNC: 27 U/L (ref 0–41)
ANION GAP SERPL CALCULATED.3IONS-SCNC: 15.3 MMOL/L (ref 5–15)
AST SERPL-CCNC: 38 U/L (ref 0–40)
BASOPHILS # BLD AUTO: 0.03 10*3/MM3 (ref 0–0.2)
BASOPHILS NFR BLD AUTO: 0.5 % (ref 0–1.5)
BILIRUB SERPL-MCNC: 0.8 MG/DL (ref 0.2–1.2)
BUN SERPL-MCNC: 20 MG/DL (ref 6–20)
BUN/CREAT SERPL: 21.5 (ref 7.3–30)
CALCIUM SPEC-SCNC: 9.7 MG/DL (ref 8.5–10.2)
CHLORIDE SERPL-SCNC: 103 MMOL/L (ref 98–107)
CO2 SERPL-SCNC: 21.7 MMOL/L (ref 22–29)
CREAT SERPL-MCNC: 0.93 MG/DL (ref 0.7–1.3)
DEPRECATED RDW RBC AUTO: 42.7 FL (ref 37–54)
EGFRCR SERPLBLD CKD-EPI 2021: 93.4 ML/MIN/1.73
EOSINOPHIL # BLD AUTO: 0.09 10*3/MM3 (ref 0–0.4)
EOSINOPHIL NFR BLD AUTO: 1.6 % (ref 0.3–6.2)
ERYTHROCYTE [DISTWIDTH] IN BLOOD BY AUTOMATED COUNT: 12.6 % (ref 12.3–15.4)
GLOBULIN UR ELPH-MCNC: 3.5 GM/DL (ref 1.8–3.5)
GLUCOSE SERPL-MCNC: 93 MG/DL (ref 74–124)
HCT VFR BLD AUTO: 47.5 % (ref 37.5–51)
HGB BLD-MCNC: 15.6 G/DL (ref 13–17.7)
IMM GRANULOCYTES # BLD AUTO: 0.01 10*3/MM3 (ref 0–0.05)
IMM GRANULOCYTES NFR BLD AUTO: 0.2 % (ref 0–0.5)
LYMPHOCYTES # BLD AUTO: 1.57 10*3/MM3 (ref 0.7–3.1)
LYMPHOCYTES NFR BLD AUTO: 28.1 % (ref 19.6–45.3)
MCH RBC QN AUTO: 30.2 PG (ref 26.6–33)
MCHC RBC AUTO-ENTMCNC: 32.8 G/DL (ref 31.5–35.7)
MCV RBC AUTO: 92.1 FL (ref 79–97)
MONOCYTES # BLD AUTO: 0.44 10*3/MM3 (ref 0.1–0.9)
MONOCYTES NFR BLD AUTO: 7.9 % (ref 5–12)
NEUTROPHILS NFR BLD AUTO: 3.45 10*3/MM3 (ref 1.7–7)
NEUTROPHILS NFR BLD AUTO: 61.7 % (ref 42.7–76)
NRBC BLD AUTO-RTO: 0 /100 WBC (ref 0–0.2)
PLATELET # BLD AUTO: 205 10*3/MM3 (ref 140–450)
PMV BLD AUTO: 9.1 FL (ref 6–12)
POTASSIUM SERPL-SCNC: 4 MMOL/L (ref 3.5–4.7)
PROT SERPL-MCNC: 7.8 G/DL (ref 6.3–8)
RBC # BLD AUTO: 5.16 10*6/MM3 (ref 4.14–5.8)
SODIUM SERPL-SCNC: 140 MMOL/L (ref 134–145)
WBC NRBC COR # BLD: 5.59 10*3/MM3 (ref 3.4–10.8)

## 2023-06-09 PROCEDURE — 85025 COMPLETE CBC W/AUTO DIFF WBC: CPT

## 2023-06-09 PROCEDURE — 80053 COMPREHEN METABOLIC PANEL: CPT

## 2023-06-09 PROCEDURE — 36415 COLL VENOUS BLD VENIPUNCTURE: CPT

## 2023-06-09 NOTE — PROGRESS NOTES
Subjective       REASONS FOR FOLLOWUP:1  MASSIVE PULMONARY EMBOLISM WITH SEVERE PULMONARY HYPERTENSION REQUIRING CATHETER EMBOLECTOMY  AND SUBSEQUENT ANTICOAGULATION WITH XARELTO  2. DVT RLE AFTER LONG DRIVE 20 HS TO [place].  3. POSITIVE ANTICARDIOLIPIN ANTIBODY IGM. NEGATIVE LUPUS ANTICOAGULANT.    HISTORY OF PRESENT ILLNESS:    On 06/09/2023 this 62-year-old male returns to the office for follow up given his previous history of massive pulmonary embolism with severe pulmonary hypertension requiring catheter embolectomy and anticoagulation. All of this happened after he had a 20 hour trip to [place] developing vein thrombosis in the right lower extremity that was ignored by him for several weeks. Finally persistency of multiple showers of clot triggered all of the event. The patient was treated with anticoagulants, remains on Xarelto with no plans to discontinue the medicine ever. Physically he feels very well. He is working out on daily basis keeping his weight and his stamina in exceptional way. His appetite is good, weight is stable, bowel activity and urination are normal. No new cardiovascular or respiratory issues. No postphlebitic syndrome in the right lower extremity. He has not had any clinical bleeding with Xarelto. He had was accidentally caught on a piece of metal in his finger that obviously triggered bleeding requiring proper care.           HEMATOLOGIC HISTORY:  Obviously the story of this patient is very typical. He drove to [place] on his own for 20 hours, not stopping. He developed pain in the calf of the right lower extremity that he called markus horse. He has shortness of breath presumed to be related to altitude sickness in [place] and upon returning to the [place] the shortness of breath remained ongoing and became worse. Since then he has had times that he has been able to do his workouts with no major difficulties and later on his workouts have been minimized given the shortness of  breath produced by minimal physical activity. A few days ago he had a new pain in the right calf close to the Achilles tendon. While mowing the grass yesterday he had syncope and very likely the explanation of all this is his pulmonary vasculature could not accommodate anymore blood clots and the flow through the left ventricle from the pulmonary veins was minimized producing syncope. The patient is alive by miracle. The procedure done by Dr. Cantu and stated above is more than dramatic in the description and tells us the amount of clots that this patient had. I pointed out to the patient that it is a miracle that he stayed alive. Many people go through this and they do not live to tell a story. Therefore, if somebody needs to have flowers and chocolates, it is Dr. Cantu and the emergency room team at [facility]. I think the quick transition and the care that this patient received is world quality and deserves to be credited.      The patient has no personal history of thrombophilia before. He has no family history of thrombophilia. He has not been taking anabolic steroids. His white count, hemoglobin and platelets are normal. His platelet count is minimally low because of platelet consumption associated with massive amount of clotting. Chemistry profile is normal. The CT angiogram from [facility] has been reviewed. Eventually we will need to ask the radiologist to amend the report because he mentioned something in the renal artery that probably is a type error. The description by Dr. Cantu has been reviewed.      The patient is already receiving Eliquis and I advised him that he will remain on Eliquis at least for a year and in my opinion probably for the rest of his life. Obviously we need to proceed with thrombophilia assessment but I think in my opinion the most likely factor that triggered all this was the long trip to [place]. He probably has had showers and showers and showers of  clots and emboli and his pulmonary circulation could not accommodate anymore clots. He ran out of physical space to put more clots and the circulation into the left side of the heart through the pulmonary veins ran out. I pointed out to him that it is like if the main water entrance of the water to the house has a clog, the rest of the house is not going to have any water. In any event the patient is up and running. He looks terrific. He has no clinical evidence of pulmonary hypertension on clinical examination and it is amazing that he is alive. I think the tolerance to all this is because he is a very fit individual who exercises all the time.      Obviously I am going to need to proceed with thrombophilia assessment that will include factor V Leiden mutation, prothrombin gene mutation, antithrombin III, lupus anticoagulant, anti-glycoprotein antibody profile, anticardiolipin antibody profile as well as protein C, protein S, fibrinogen, lipid profile as well as lupus anticoagulant. I will perform as well a serum protein electrophoresis and serum free light chains.      The patient has had screening colonoscopy in the past, prostate examination being negative normal. No family history of malignancy. No family history of thrombophilia. I need to see this patient in the office in a couple of weeks to go through the report of his laboratory testing. Eventually we will need to repeat a CT angiogram of the chest to be sure that all of his clots in the pulmonary circulation are clean and I am sure Dr. Cantu eventually will perform another echocardiogram to be sure that there is resolution of his pulmonary hypertension by this methodology.      I pointed out to the patient that he will need to stay away from trauma and I made the description in the common sense utilization at this point.      The patient likes to drink his beers. He does not get drunk but I pointed out that anticoagulants are not good friends of alcohol  and his wife will take care of this problem.      He raised the question if sex life will have anything to do with all these issues in regard to inability to perform and I pointed out to him that should not be an issue. I asked him to give himself a break until things get better in the next couple of weeks.    The patient had a history of going to [place] to take a car to his daughter, driving for 20 hours almost nonstop and he developed swelling and pain in the right lower extremity that continued intermittently after he culminated all the issues pertinent to above. My notes from the original consultation were very explicit and described the history of the present illness extremely well in my opinion. In any event, since the patient was discharged from the hospital, he has been getting better and better as time goes by. He has very occasional sensation of flip flop in his heart but no sensation of persistent tachyarrhythmia. No chest pain, no pleuritic pain, no hemoptysis, no cough, no shortness of breath. His appetite has been terrific. He has not had any diarrhea like he was having before and he has no abdominal pain, distention or jaundice. His urination is normal. He has had significant improvement but not complete resolution of the swelling in the right lower extremity with no pain. He has not had any clinical bleeding. He feels extremely well. He has not had any fever or infection. He reminded me that some of these events happened after he proceeded with his COVID vaccination weeks before the trip.    THROMBOPHILIA  CLINICAL AND LABORATORY REPORT:  DATE:    10 /4 /2021     DIAGNOSIS: MASSIVE PE REQUIRING EMBOLECTOMY. DVT RLE      KEY: P EQUAL TO POSITIVE  , N  EQUAL TO NEGATIVE. NA : NOT APLICABLE    PREPARED BY HAYLEY DICKERSON MD STAYS IN THE CHART PERMANENTLY      CLINICAL FACTORS:     OBESITY: ___N___     DIABETES: __N____.    IMMOBILITY: __N____.    SMOKING HISTORY : ______N______ PACK A DAY/ YEARS  ________    LONG TRIPS BY CAR OR AIRPLANE: __YES 20 HS DRIVE TO [place]______    RECENT SURGERY: ____N______   LOCATION: ________    TRAUMA: ___N_____    MEDICATIONS:   BIRTH CONTROL MEDICATIONS ___N_____ ANDROGENS _N______ ESTROGENS ___N_____ HEPARIN __N______ OTHERS ____N___    PREGNANCY:   ____N____.    FAMILY HISTORY OF BLOOD CLOTHS:    POSITIVE:   ________ NEGATIVE ___N____ on 3/21/2022 brother has pe after prostate cancer surgery    HEMATOLOGIC DISORDER:  POLYCYTHEMIA VERA ____N___ THROMBOCYTOSIS ___N___    DIC:___N____  TTP ___N_______    COLLAGEN VASCULAR DISEASE: _N____   DIAGNOSIS: ________________________.    VASCULITIS: TYPE AND LOCATION ____N___________ BIOPSY PROVEN:____________    CANCER DIAGNOSIS: __N_____    TYPE ________    CANCER SCREENING:  BREAST _______ COLON ___Y_____ LUNG ________ GENITOURINARY __Y_______   CT SCANS ___________ NORMAL ______ ABNORMAL ________    INDWELLING VASCULAR DEVICE:  ___N______. LOCATION _________    INFLAMMATORY BOWEL DISEASE: ULCERATIVE COLITIS ______N____   CHRON'S DISEASE ___________    RECENT COVID 19 INFECTION __N_____________.          LABORATORY:    FACTOR V LEIDEN MUTATION: POSITIVE   _____ NEGATIVE __Y___ HETEROZYGOUS ________ HOMOZYGOUS ________    PROTHROMBIN GENE MUTATION:  POSITIVE _____ NEGATIVE __Y___    ANTITHROMBIN III : NORMAL _____   ABNORMAL __Y____ QUANTIFICATION: __66_____    PROTEIN S ANTIGEN ____N______ PROTEIN S FUNCTIONAL _____N___ PROTEIN C ___N______     LUPUS ANTICOAGULANT: POSITIVE _____  NEGATIVE _N____. REPEATED  6 WEEKS LATER ______    ANTICARDIOLIPIN ANTIBODY PROFILE : POSITIVE ___Y___  NEGATIVE ______  IGG __N___ IGM _34____ REPEATED 6 WEEKS LATER _________    ANTI GLYCOPROTEIN ANTIBODY:  POSITIVE _____  NEGATIVE  __N_____   IGG _____ IGM _____ REPEATED 6 WEEKS LATER ________    ANTIPHOSPATIDIL SERINE ANTIBODY:  POSITIVE _____ NEGATIVE __N_____    SERUM PROTEIN ELECTROPHORESIS AND IMMUNOFIXATION: NO MONOCLONAL PROTEIN ___N_____  POSITIVE MONOCLONAL PROTEIN ______ TYPE _________    C REACTIVE PROTEIN HIGH SENSIBILITY: NORMAL _______ ABNORMAL :QUANTIFICATION ________    SEDIMENTATION RATE: _____ MM/H.    FIBRINOGEN LEVEL: _________ NORMAL __Y_____  ABNORMAL __________.    LIPID PROFILE:    CHOLESTEROL HIGH _____N_____  TRYGLYCERIDES HIGH  ___N_____    HEPARIN ASSOCIATED ANTIPLATELET ANTIBODY: __NA_______          Past Medical History:   Diagnosis Date    DVT (deep venous thrombosis)     RLE in the distal femoral, popliteal, posterior tibial, peroneal, and soleal.    Pulmonary emboli         Past Surgical History:   Procedure Laterality Date    APPENDECTOMY      CARDIAC CATHETERIZATION N/A 9/18/2021    Procedure: Percutaneous Manual Thrombectomy- INARI;  Surgeon: Enrike Cantu MD;  Location: [facility] CATH INVASIVE LOCATION;  Service: Cardiovascular;  Laterality: N/A;    CARDIAC CATHETERIZATION N/A 9/18/2021    Procedure: Right Heart Cath;  Surgeon: Enrike Cantu MD;  Location:  [facility] CATH INVASIVE LOCATION;  Service: Cardiovascular;  Laterality: N/A;    COLONOSCOPY      negative normal    EMBOLECTOMY      INTERVENTIONAL RADIOLOGY PROCEDURE Bilateral 9/18/2021    Procedure: Pumonary angiography -Bilateral;  Surgeon: Enrike Cantu MD;  Location:  [facility] CATH INVASIVE LOCATION;  Service: Cardiovascular;  Laterality: Bilateral;    TONSILLECTOMY          Current Outpatient Medications on File Prior to Visit   Medication Sig Dispense Refill    escitalopram (LEXAPRO) 20 MG tablet Take 1 tablet by mouth Daily.      loratadine (CLARITIN) 10 MG tablet Take 1 tablet by mouth Daily.      rivaroxaban (Xarelto) 20 MG tablet TAKE 1 TABLET BY MOUTH EVERY DAY 90 tablet 2    Triamcinolone Acetonide (NASACORT) 55 MCG/ACT nasal inhaler 2 sprays into the nostril(s) as directed by provider Daily.       No current facility-administered medications on file prior to visit.        ALLERGIES:    Allergies   Allergen Reactions    Penicillins Rash        Social  "History     Socioeconomic History    Marital status:      Spouse name: Marimar   Tobacco Use    Smoking status: Never    Smokeless tobacco: Never    Tobacco comments:     CAFFEINE -  2 CUPS COFFEE DAILY    Vaping Use    Vaping Use: Never used   Substance and Sexual Activity    Alcohol use: Yes     Alcohol/week: 6.0 standard drinks     Types: 6 Cans of beer per week     Comment: occas on weekends    Drug use: Never    Sexual activity: Defer        Family History   Problem Relation Age of Onset    Hypertension Mother     Hypertension Father     Suicidality Brother     Hypertension Brother     Prostate cancer Brother     Autism Son           Objective     Vitals:    06/09/23 1116   BP: 107/74   Pulse: 55   Temp: 97.8 °F (36.6 °C)   TempSrc: Temporal   SpO2: 98%   Weight: 101 kg (221 lb 14.4 oz)   Height: 175.3 cm (69.02\")         6/9/2023    11:17 AM   Current Status   ECOG score 0       Physical Exam               GENERAL:  Well-developed, Patient  in no acute distress.   SKIN:  Warm, dry ,NO purpura ,no rash.  HEENT:  Pupils were equal and reactive to light and accomodation, conjunctivae noninjected,  normal visual acuity.   NECK:  Supple with good range of motion; no thyromegaly , no JVD or bruits,.No carotid artery pain, no carotid abnormal pulsation   LYMPHATICS:  No cervical, NO supraclavicular, NO axillary, NO inguinal adenopathies.  CARDIAC   normal rate , regular rhythm, without murmur,NO rubs NO S3 NO S4   LUNGS: normal breath sounds bilateral, no wheezing, NO rhonchi, NO crackles ,NO rubs.  VASCULAR VENOUS: no cyanosis, NO collateral circulation, NO varicosities, NO edema, NO palpable cords, NO pain,NO erythema, NO pigmentation of the skin.  ABDOMEN:  Soft, NO pain,no hepatomegaly, no splenomegaly,no masses, no ascites, no collateral circulation,no distention.  EXTREMITIES  AND SPINE:  No clubbing, no cyanosis ,no deformities , no pain .No kyphosis,  no pain in spine, no pain in ribs , no pain in " pelvic bone.  NEUROLOGICAL:  Patient was awake, alert, oriented to time, person and place.      RECENT LABS:  Hematology WBC   Date Value Ref Range Status   06/09/2023 5.59 3.40 - 10.80 10*3/mm3 Final     RBC   Date Value Ref Range Status   06/09/2023 5.16 4.14 - 5.80 10*6/mm3 Final     Hemoglobin   Date Value Ref Range Status   06/09/2023 15.6 13.0 - 17.7 g/dL Final     Hematocrit   Date Value Ref Range Status   06/09/2023 47.5 37.5 - 51.0 % Final     Platelets   Date Value Ref Range Status   06/09/2023 205 140 - 450 10*3/mm3 Final       CBC:    WBC   Date Value Ref Range Status   06/09/2023 5.59 3.40 - 10.80 10*3/mm3 Final     RBC   Date Value Ref Range Status   06/09/2023 5.16 4.14 - 5.80 10*6/mm3 Final     Hemoglobin   Date Value Ref Range Status   06/09/2023 15.6 13.0 - 17.7 g/dL Final     Hematocrit   Date Value Ref Range Status   06/09/2023 47.5 37.5 - 51.0 % Final     MCV   Date Value Ref Range Status   06/09/2023 92.1 79.0 - 97.0 fL Final     MCH   Date Value Ref Range Status   06/09/2023 30.2 26.6 - 33.0 pg Final     MCHC   Date Value Ref Range Status   06/09/2023 32.8 31.5 - 35.7 g/dL Final     RDW   Date Value Ref Range Status   06/09/2023 12.6 12.3 - 15.4 % Final     RDW-SD   Date Value Ref Range Status   06/09/2023 42.7 37.0 - 54.0 fl Final     MPV   Date Value Ref Range Status   06/09/2023 9.1 6.0 - 12.0 fL Final     Platelets   Date Value Ref Range Status   06/09/2023 205 140 - 450 10*3/mm3 Final     Neutrophil %   Date Value Ref Range Status   06/09/2023 61.7 42.7 - 76.0 % Final     Lymphocyte %   Date Value Ref Range Status   06/09/2023 28.1 19.6 - 45.3 % Final     Monocyte %   Date Value Ref Range Status   06/09/2023 7.9 5.0 - 12.0 % Final     Eosinophil %   Date Value Ref Range Status   06/09/2023 1.6 0.3 - 6.2 % Final     Basophil %   Date Value Ref Range Status   06/09/2023 0.5 0.0 - 1.5 % Final     Immature Grans %   Date Value Ref Range Status   06/09/2023 0.2 0.0 - 0.5 % Final      Neutrophils, Absolute   Date Value Ref Range Status   06/09/2023 3.45 1.70 - 7.00 10*3/mm3 Final     Lymphocytes, Absolute   Date Value Ref Range Status   06/09/2023 1.57 0.70 - 3.10 10*3/mm3 Final     Monocytes, Absolute   Date Value Ref Range Status   06/09/2023 0.44 0.10 - 0.90 10*3/mm3 Final     Eosinophils, Absolute   Date Value Ref Range Status   06/09/2023 0.09 0.00 - 0.40 10*3/mm3 Final     Basophils, Absolute   Date Value Ref Range Status   06/09/2023 0.03 0.00 - 0.20 10*3/mm3 Final     Immature Grans, Absolute   Date Value Ref Range Status   06/09/2023 0.01 0.00 - 0.05 10*3/mm3 Final     nRBC   Date Value Ref Range Status   06/09/2023 0.0 0.0 - 0.2 /100 WBC Final        CMP:    Glucose   Date Value Ref Range Status   12/28/2021 85 65 - 99 mg/dL Final     BUN   Date Value Ref Range Status   12/28/2021 21 8 - 23 mg/dL Final     Creatinine   Date Value Ref Range Status   12/28/2021 0.95 0.76 - 1.27 mg/dL Final     Sodium   Date Value Ref Range Status   12/28/2021 138 136 - 145 mmol/L Final     Potassium   Date Value Ref Range Status   12/28/2021 4.0 3.5 - 5.2 mmol/L Final     Chloride   Date Value Ref Range Status   12/28/2021 101 98 - 107 mmol/L Final     CO2   Date Value Ref Range Status   12/28/2021 27.3 22.0 - 29.0 mmol/L Final     Calcium   Date Value Ref Range Status   12/28/2021 9.3 8.6 - 10.5 mg/dL Final     Total Protein   Date Value Ref Range Status   12/28/2021 7.4 6.0 - 8.5 g/dL Final     Albumin   Date Value Ref Range Status   12/28/2021 4.50 3.50 - 5.20 g/dL Final     ALT (SGPT)   Date Value Ref Range Status   12/28/2021 32 1 - 41 U/L Final     AST (SGOT)   Date Value Ref Range Status   12/28/2021 36 1 - 40 U/L Final     Alkaline Phosphatase   Date Value Ref Range Status   12/28/2021 93 39 - 117 U/L Final     Total Bilirubin   Date Value Ref Range Status   12/28/2021 0.6 0.0 - 1.2 mg/dL Final     Globulin   Date Value Ref Range Status   12/28/2021 2.9 gm/dL Final     A/G Ratio   Date Value  Ref Range Status   12/28/2021 1.6 g/dL Final     BUN/Creatinine Ratio   Date Value Ref Range Status   12/28/2021 22.1 7.0 - 25.0 Final     Anion Gap   Date Value Ref Range Status   12/28/2021 9.7 5.0 - 15.0 mmol/L Final           Recent imaging:    No results found.     Assessment & Plan     Diagnoses and all orders for this visit:    1. Anticardiolipin antibody positive (Primary)  -     CBC & Differential; Future  -     Comprehensive Metabolic Panel; Future  -     Lupus Anticoagulant Reflex; Future  -     Anticardiolipin Antibody, IgG / M, Qn; Future  -     Beta-2 Glycoprotein Antibodies; Future    2. Current use of long term anticoagulation  -     CBC & Differential; Future  -     Comprehensive Metabolic Panel; Future  -     Lupus Anticoagulant Reflex; Future  -     Anticardiolipin Antibody, IgG / M, Qn; Future  -     Beta-2 Glycoprotein Antibodies; Future    3. Acute deep vein thrombosis (DVT) of distal vein of right lower extremity  -     CBC & Differential; Future  -     Comprehensive Metabolic Panel; Future  -     Lupus Anticoagulant Reflex; Future  -     Anticardiolipin Antibody, IgG / M, Qn; Future  -     Beta-2 Glycoprotein Antibodies; Future         The patient was brought back to the office on 10/04/2021. Since the hospital stay he has been terrific at home. He has gained back his mobility and his ability to function with no limitations. He has had near complete resolution of the swelling and pain in the right calf and right lower extremity and he has not had any shortness of breath, palpitations, pleuritic pain, cough, hemoptysis. He occasional has the sensation of the heart that flip flops and lasts for 1 or 2 seconds in absence of any pain.     He has not had any clinical bleeding on the Eliquis.     His clinical examination today besides minimal residual swelling in the right lower extremity shows no other abnormalities and there is no evidence clinically of pulmonary hypertension.     The  thrombophilia labs have documented the only abnormality, an anticardiolipin antibody IgM that was marginally positive. The rest of the studies as posted above in the hematological history were negative. This leads me to conclude that the most likely factor that triggered the clot in this patient was the positive anticardiolipin antibody but most importantly the long drive to [place], 20 hours in a car. The patient stopped on 2 occasions for gasoline and food and that was the rest of the story. He developed swelling on the trip in the right lower extremity and he kept having swelling and pain in the calf for many weeks after that having very likely frequent showers of emboli that filled up the pulmonary circulation and culminated with his syncopal episode while mowing grass.     The patient's clinical examination today otherwise is unrevealing. He feels great. He also has resolution of the thrombocytopenia that he had in the hospital that is probably associated with excessive platelet consumption but massive amount of clots. The platelet count, hemoglobin and white count today are completely normal.     My recommendations for this patient are as follows:   1. He will remain on his Eliquis for the time being and I have recommended to the patient to remain on this medicine for the rest of his life. I feel afraid of this patient having another episode and I feel afraid of this patient having any other issues that would culminate with more complications or problems associated with this process.   2. I advised the patient to have a repeat CT angiogram of the chest and Doppler study of the right lower extremity in 5 weeks and to repeat the level of antithrombin III as well as the anticardiolipin antibody in 5 weeks. I would like to review him back in 6 weeks.   3. I advised him to use elastic support in the right lower extremity if possible all the time. He does not need to use this at nighttime.   4. I advised him  against any potential for trauma to minimize bleeding associated with anticoagulant use.   5. I advised him to resume his beer drinking that he loves to do. I asked him that he needs to do this even with more than moderation. Maybe a beer here and there and he is not allowed to get tipsy. He is aware that too much alcohol and anticoagulants are not good friends from the point of view of falling, trauma and circumstances of that nature.     The patient raised the question about his sex life and I told him that I think he can resume this according to his circumstances with no significant limitations.     I discussed all these facts with the patient. I provided him copies of the laboratory testing and I discussed this with the patient’s wife.     They raised the question if I do believe that the COVID vaccination had something to do with this. He received this a few weeks before the trip to [place]. Maybe that was a triggering factor for anticardiolipin antibodies. I am not sure about the answer that I need to do in this regard. He received Pfizer vaccination. I advised him though in the home run to avoid the use of a 2nd dose on himself at this point until we clarify and remove more smoke out of the air.  I reviewed the patient on 11/15/2021. Since the previous visit he has had complete resolution of the symptoms pertinent to his pulmonary embolism. He has had a normal clinical examination today. He has had minimal leftover edema in the right lower extremity that is barely visible to my eyes today.    The CT angiogram of the chest shows complete resolution of his massive amount of pulmonary embolism and there is no evidence of pulmonary hypertension.     The Doppler study of the lower extremity documents a subacute clot in the popliteal vein on the right lower extremity. No other sites of clots.     The patient was sent back to the lab today to obtain his laboratory testing that includes a CBC, anticardiolipin  antibody and glycoprotein antibodies as well as antithrombin-III.          I had a lengthy conversation with the patient today in discussion with him and his wife present in the room in regard issues pertinent to anticoagulants. I strongly believe that this patient needs to remain on anticoagulant for the rest of his life given the gravity of the clinical picture that he presented with the first time. Maybe in the future it will be amenable for us to decrease the degree of anticoagulant utilization. His insurance company has already notified him that for next year they will not pay for Eliquis that will obligate us to make the transition from Eliquis to Xarelto. I do not have any difficulties with this. The patient has tablets of Eliquis to last for another 3-4 weeks. I asked him to go ahead and continue taking them and once that he is done with the last dose of Eliquis he will initiate Xarelto at the dose of 20 mg once a day.     I pointed out to the patient that he needs to wear elastic support in his right lower extremity no matter what the situation is. I asked him to stay away from extensive amount of alcohol consumption and minimize trauma.     The patient returned to the office on 03/21/2022. Since the previous visit he has been feeling terrific. He is back to his normal exercise activity with no limitations whatsoever. He is able to do anything that he needs to do physically wise and he has no sensation of shortness of breath, chest pain or palpitation. There is no postphlebitic syndrome in the right lower extremity. The patient remains taking his Eliquis in a normal schedule with no clinical bleeding.     The patient raised the question and his wife sent the message in regard to the correlation between COVID testing and blood clots. Typically the association is related to [employer] vaccine, not related to the Moderna or Pfizer vaccine. [employer] that is not utilized in [place] also has some of  those concerns in regard to clots. Mostly happens in women. Again my concern in regard to his thrombophilia is based more in regard to the time of traveling 20 hours in a car and the subsequent self neglect in regard to recognizing the symptoms of shortness of breath that produce many showers of pulmonary emboli that ended up with severe pulmonary hypertension and syncope. He required removal of clots and thrombolytic therapy. He is alive because of a miracle in my opinion and his neighbors who pay attention to his actions outside on the day that the issue happened.    He raised the question in regard for how long he needs to be anticoagulated. I told him that given the intensity of his process in my opinion is that probably for life but he does not want to listen to this answer yet. My approach will be eventually to do a D-dimer that entire literature has been documented to be predictive of future events if the test is abnormal before any anticoagulation is discontinued.     I advised him again to avoid trauma at any cost remaining on anticoagulation and I asked him to return to see us back in 6 months.     If he wishes to have a booster shot for COVID he is welcome to do that either receiving the Pfizer vaccine or Moderna vaccine that is my recommendation.     I advised him also to remain physically active like he is. He is a fan of exercise and I asked him to continue to work on this.    Other than that no other significant recommendation for him at this point. He brought up to me the story that his brother had prostate cancer surgery recently and he ended up with a pulmonary embolism. We have done the thrombophilia assessment for family and all of the testing posted above was negative.   On 10/24/2022 the patient was further reviewed. On clinical examination he is completely normal with no symptoms or signs of any occlusive venous disease or complications from his dramatic pulmonary embolism that he was safe  from before with medicines and procedures. The patient is back to baseline. He is doing exercise and he is staying away from red meat completely. He has not encountered any problems with his anticoagulant that he takes religiously on schedule with no clinical bleeding. He also questions if he needs to remain on the anticoagulant continuously and I mentioned to him that given the severity of his process he probably will need to remain on the medicine for the time being.     As we know we have to test on him anticardiolipin antibody and anti-glycoprotein antibodies and lupus anticoagulant periodically and we have a set of these studies pending today. A lipid profile that was done by the end of last year also documented significant elevation of cholesterol and triglycerides and I discussed with him and his wife the need to have a repeat lipid test today.     The patient's brother has a terrible lipid profile. He is hypertensive. He has had thrombophilia as well before.     My advice to the patient is as follows:   1. He will remain on anticoagulant for the time being.   2. He will avoid trauma at any cost to minimize bleeding.   3. He will return in 6 months for a repeat CBC, CMP, lipid profile, anticardiolipin antibody profile, lupus anticoagulant.   4. The patient was advised in regard to the phone call that he will get once that we get the report of the laboratory testing requested above.     I discussed these facts with the patient present in the room. He requested a conversation with his wife via FaceTime that we performed over 10 minutes with her giving her explanations about why he needs to remain on anticoagulant and the need for him to protect himself from trauma.     As usual the patient loves his beer and drinks his beer and I pointed out to him that moderation is the rule of the day, 2-3 beers together would not affect too much but more than that can have terrible consequences of his health not only in the  future but also in the present with the potential for car accidents, personal falls and many other things that can happen under those circumstances. He is aware of that and he will try to limit this issue in the best way that he can.  On 06/09/2023 the patient was further reviewed. Since the previous visit he has not had any episodes of clots, he has not developed any postphlebitic syndrome or any long-term consequences of pulmonary hypertension in regard to his pulmonary embolism. He remains on Xarelto, he is compliant with the medicine 100%. He had clinical bleeding after he caught one of his fingers on a piece of metal and he realized how active bleeding can become under these circumstances.     Today his white count, hemoglobin, platelets and white count differential are normal. I advised him to remain on his Xarelto for the time being 2 mg a day, no plans to stop anticoagulation at anytime soon. He is advised one more time the avoidance of trauma while taking anticoagulants. He already had an experience posted above.     We know that the patient has had an anticardiolipin antibody IgM with a lupus anticoagulant negative. He is not one of those patients who have triple positive anticardiolipin antibody positivity. For this reason Xarelto remains the way to go. He has no need for Coumadin under the present circumstances. He will be called at home with the report of this antibody testing done today.    We will review him back in a year with a CBC, CMP and repeat anticardiolipin antibody testing and lupus anticoagulant and antiglycoprotein antibody at that time.

## 2023-06-10 LAB
CARDIOLIPIN IGG SER IA-ACNC: <9 GPL U/ML (ref 0–14)
CARDIOLIPIN IGM SER IA-ACNC: 24 MPL U/ML (ref 0–12)

## 2023-06-12 ENCOUNTER — TELEPHONE (OUTPATIENT)
Dept: ONCOLOGY | Facility: CLINIC | Age: 63
End: 2023-06-12
Payer: COMMERCIAL

## 2023-06-12 NOTE — TELEPHONE ENCOUNTER
----- Message from Adonay Washington MD sent at 6/12/2023  7:50 AM EDT -----  Call his acla positive still stable , keep anticoagulant the same

## 2023-06-12 NOTE — TELEPHONE ENCOUNTER
"Caller: Christopher Gupta \"Donald\"    Relationship to patient: Self    Best call back number: [phone]    Patient is needing: TO RETURN CALL TO NURSE. HUB READ THE MESSAGE \"HUB OKAY TO READ: \"ACLA positive. Still stable. Keep anticoagulation the same.\" Allegra Cabrera RN\". PT V/U AND STATED HE DID NOT HAVE ANY QUESTIONS.  "

## 2023-06-12 NOTE — TELEPHONE ENCOUNTER
"Unable to leave a message. HUB OKAY TO READ: \"ACLA positive. Still stable. Keep anticoagulation the same.\" Allegra Cabrera RN   "

## 2023-06-13 LAB
APTT HEX PL PPP: 7 SEC (ref 0–11)
B2 GLYCOPROT1 IGA SER-ACNC: <9 GPI IGA UNITS (ref 0–25)
B2 GLYCOPROT1 IGG SER-ACNC: <9 GPI IGG UNITS (ref 0–20)
B2 GLYCOPROT1 IGM SER-ACNC: <9 GPI IGM UNITS (ref 0–32)
DRVVT SCREEN TO CONFIRM RATIO: 1.9 RATIO (ref 0.8–1.2)
LA 2 SCREEN W REFLEX-IMP: ABNORMAL
MIXING APTT: 46.2 SEC (ref 0–40.5)
MIXING DRVVT: 82.3 SEC (ref 0–40.4)
SCREEN APTT: 45.6 SEC (ref 0–43.5)
SCREEN DRVVT: 128.5 SEC (ref 0–47)

## 2023-06-14 ENCOUNTER — TELEPHONE (OUTPATIENT)
Dept: ONCOLOGY | Facility: CLINIC | Age: 63
End: 2023-06-14
Payer: COMMERCIAL

## 2023-06-14 NOTE — TELEPHONE ENCOUNTER
Called patient and let him know his LA remains positive. He is to stay on AC as is. Pt v/u. Allegra Cabrera RN

## 2023-12-01 ENCOUNTER — OFFICE VISIT (OUTPATIENT)
Dept: CARDIOLOGY | Facility: CLINIC | Age: 63
End: 2023-12-01
Payer: COMMERCIAL

## 2023-12-01 VITALS
BODY MASS INDEX: 33.33 KG/M2 | WEIGHT: 225 LBS | SYSTOLIC BLOOD PRESSURE: 116 MMHG | HEIGHT: 69 IN | HEART RATE: 57 BPM | DIASTOLIC BLOOD PRESSURE: 70 MMHG

## 2023-12-01 DIAGNOSIS — Z79.01 CURRENT USE OF LONG TERM ANTICOAGULATION: ICD-10-CM

## 2023-12-01 DIAGNOSIS — Z86.711 HISTORY OF PULMONARY EMBOLISM: Primary | ICD-10-CM

## 2023-12-01 NOTE — PROGRESS NOTES
Date of Office Visit: 2023  Encounter Provider: LINA Acosta  Place of Service: [facility] CARDIOLOGY  Patient Name: Christopher Gupta  :1960    Chief Complaint   Patient presents with    Hyperlipidemia   :     HPI: Christopher Gupta is a 63 y.o. male patient of Dr. Cantu's suffered bilateral pulmonary emboli in 2021 for which he underwent embolectomy.  He was weakly positive for antiphospholipid antibody, and lifelong anticoagulation has been recommended.    He was last seen in the office by LINA Man in 2022 at which time he was doing well.  He was scheduled for vascular screening as well as a cardiac calcium score.  The vascular screening was normal.  The cardiac calcium score was 0.  No changes were made to his regimen, and he was advised to follow-up in 1 year.    He has been doing well.  He denies any chest pain, shortness of breath, palpitations, edema, dizziness, syncope, bleeding difficulties or melena.  He remains very active.    Past Medical History:   Diagnosis Date    DVT (deep venous thrombosis)     RLE in the distal femoral, popliteal, posterior tibial, peroneal, and soleal.    Pulmonary emboli        Past Surgical History:   Procedure Laterality Date    APPENDECTOMY      CARDIAC CATHETERIZATION N/A 2021    Procedure: Percutaneous Manual Thrombectomy- INARI;  Surgeon: Enrike Cantu MD;  Location[facility] CATH INVASIVE LOC[facility];  Service: Cardiovascular;  Laterality: N/A;    CARDIAC CATHETERIZATION N/A 2021    Procedure: Right Heart Cath;  Surgeon: Enrike Cantu MD;  [facility] C[facility];  Service: Cardiovascular;  Laterality: N/A;    COLONOSCOPY      negative normal    EMBOLECTOMY      INTERVENTIONAL RADIOLOGY PROCEDURE Bilateral 2021    Procedure: Pumonary angiography -Bilateral;  Surgeon: Enrike Cantu MD;  Location:  [facility] CATH INVASIVE LOCATION;  Service: Cardiovascular;   "Laterality: Bilateral;    TONSILLECTOMY         Social History     Socioeconomic History    Marital status:      Spouse name: Marimar   Tobacco Use    Smoking status: Never    Smokeless tobacco: Never    Tobacco comments:     CAFFEINE -  2 CUPS COFFEE DAILY    Vaping Use    Vaping Use: Never used   Substance and Sexual Activity    Alcohol use: Yes     Alcohol/week: 6.0 standard drinks of alcohol     Types: 6 Cans of beer per week     Comment: occas on weekends    Drug use: Never    Sexual activity: Defer       Family History   Problem Relation Age of Onset    Hypertension Mother     Hypertension Father     Suicidality Brother     Hypertension Brother     Prostate cancer Brother     Autism Son        Review of Systems   Constitutional: Negative.   Cardiovascular: Negative.  Negative for chest pain, dyspnea on exertion, leg swelling, orthopnea, paroxysmal nocturnal dyspnea and syncope.   Respiratory: Negative.     Hematologic/Lymphatic: Negative for bleeding problem.   Musculoskeletal:  Negative for falls.   Gastrointestinal:  Negative for melena.   Neurological:  Negative for dizziness and light-headedness.       Allergies   Allergen Reactions    Penicillins Rash         Current Outpatient Medications:     escitalopram (LEXAPRO) 20 MG tablet, Take 1 tablet by mouth Daily., Disp: , Rfl:     loratadine (CLARITIN) 10 MG tablet, Take 1 tablet by mouth Daily., Disp: , Rfl:     rivaroxaban (Xarelto) 20 MG tablet, TAKE 1 TABLET BY MOUTH EVERY DAY, Disp: 90 tablet, Rfl: 2    Triamcinolone Acetonide (NASACORT) 55 MCG/ACT nasal inhaler, 2 sprays into the nostril(s) as directed by provider Daily., Disp: , Rfl:       Objective:     Vitals:    12/01/23 1356   BP: 116/70   Pulse: 57   Weight: 102 kg (225 lb)   Height: 175.3 cm (69\")     Body mass index is 33.23 kg/m².    PHYSICAL EXAM:    Neck:      Vascular: No JVD.   Pulmonary:      Effort: Pulmonary effort is normal.      Breath sounds: Normal breath sounds. "   Cardiovascular:      Normal rate. Regular rhythm.      Murmurs: There is no murmur.      No gallop.  No click. No rub.   Pulses:     Intact distal pulses.           ECG 12 Lead    Date/Time: 12/1/2023 2:11 PM  Performed by: Debbie Mar APRN    Authorized by: Debbie Mar APRN  Comparison: compared with previous ECG from 10/28/2022  Similar to previous ECG  Rhythm: sinus rhythm  Rate: normal  BPM: 57            Assessment:       Diagnosis Plan   1. History of pulmonary embolism  ECG 12 Lead      2. Current use of long term anticoagulation          Orders Placed This Encounter   Procedures    ECG 12 Lead     This order was created via procedure documentation     Order Specific Question:   Release to patient     Answer:   Routine Release [[id number]]          Plan:       He is doing great.  He is functioning at a high level without any difficulty.  He remains anticoagulated with Xarelto which has been recommended lifelong.  I am not making any changes today, and he will follow-up with Dr. Cantu in 1 year.      As always, it has been a pleasure to participate in your patient's care.      Sincerely,         LINA Harvey

## 2024-05-24 ENCOUNTER — TELEPHONE (OUTPATIENT)
Dept: ONCOLOGY | Facility: CLINIC | Age: 64
End: 2024-05-24
Payer: COMMERCIAL

## 2024-05-24 NOTE — TELEPHONE ENCOUNTER
"  Caller: Christopher Gupta \"Donald\"    Relationship: Self    Best call back number: [phone]     What is the best time to reach you: ASAP    Who are you requesting to speak with (clinical staff, provider,  specific staff member): CLINICAL      What was the call regarding: PT IS HAVING COLONOSCOPY 6/6, PLEASE ADVISE IF THERE ARE ANY ISSUES WITH HAVING THIS DONE THE DAY BEFORE HIS LABS AND FOLLOW UP.       "

## 2024-06-06 ENCOUNTER — OFFICE (OUTPATIENT)
Dept: URBAN - METROPOLITAN AREA PATHOLOGY 19 | Facility: PATHOLOGY | Age: 64
End: 2024-06-06
Payer: COMMERCIAL

## 2024-06-06 ENCOUNTER — ON CAMPUS - OUTPATIENT (OUTPATIENT)
Dept: URBAN - METROPOLITAN AREA HOSPITAL 2 | Facility: HOSPITAL | Age: 64
End: 2024-06-06
Payer: COMMERCIAL

## 2024-06-06 VITALS
DIASTOLIC BLOOD PRESSURE: 76 MMHG | WEIGHT: 207.4 LBS | TEMPERATURE: 97.2 F | SYSTOLIC BLOOD PRESSURE: 118 MMHG | DIASTOLIC BLOOD PRESSURE: 56 MMHG | HEART RATE: 55 BPM | DIASTOLIC BLOOD PRESSURE: 93 MMHG | HEART RATE: 51 BPM | SYSTOLIC BLOOD PRESSURE: 92 MMHG | RESPIRATION RATE: 16 BRPM | RESPIRATION RATE: 20 BRPM | HEART RATE: 56 BPM | DIASTOLIC BLOOD PRESSURE: 88 MMHG | HEART RATE: 61 BPM | RESPIRATION RATE: 12 BRPM | HEART RATE: 48 BPM | SYSTOLIC BLOOD PRESSURE: 106 MMHG | RESPIRATION RATE: 18 BRPM | DIASTOLIC BLOOD PRESSURE: 80 MMHG | OXYGEN SATURATION: 100 % | SYSTOLIC BLOOD PRESSURE: 88 MMHG | DIASTOLIC BLOOD PRESSURE: 63 MMHG | SYSTOLIC BLOOD PRESSURE: 127 MMHG | DIASTOLIC BLOOD PRESSURE: 61 MMHG | SYSTOLIC BLOOD PRESSURE: 101 MMHG | HEART RATE: 54 BPM | DIASTOLIC BLOOD PRESSURE: 69 MMHG | RESPIRATION RATE: 19 BRPM | OXYGEN SATURATION: 98 % | HEART RATE: 50 BPM | HEIGHT: 69 IN | SYSTOLIC BLOOD PRESSURE: 98 MMHG | SYSTOLIC BLOOD PRESSURE: 131 MMHG

## 2024-06-06 DIAGNOSIS — Z12.11 ENCOUNTER FOR SCREENING FOR MALIGNANT NEOPLASM OF COLON: ICD-10-CM

## 2024-06-06 DIAGNOSIS — D12.2 BENIGN NEOPLASM OF ASCENDING COLON: ICD-10-CM

## 2024-06-06 DIAGNOSIS — K64.0 FIRST DEGREE HEMORRHOIDS: ICD-10-CM

## 2024-06-06 PROBLEM — K63.5 POLYP OF COLON: Status: ACTIVE | Noted: 2024-06-06

## 2024-06-06 LAB
GI HISTOLOGY: A. ASCENDING COLON: (no result)
GI HISTOLOGY: PDF REPORT: (no result)

## 2024-06-06 PROCEDURE — 45385 COLONOSCOPY W/LESION REMOVAL: CPT | Mod: 33 | Performed by: INTERNAL MEDICINE

## 2024-06-06 PROCEDURE — 88305 TISSUE EXAM BY PATHOLOGIST: CPT | Performed by: PATHOLOGY

## 2024-06-06 NOTE — SERVICEHPINOTES
JACKIE QUEZADA  is a  63  male   who presents today for a  Colonoscopy   for   the indications listed below. The updated Patient Profile was reviewed prior to the procedure, in conjunction with the Physical Exam, including medical conditions, surgical procedures, medications, allergies, family history and social history. See Physical Exam time stamp below for date and time of HPI completion.Pre-operatively, I reviewed the indication(s) for the procedure, the risks of the procedure [including but not limited to: unexpected bleeding possibly requiring hospitalization and/or unplanned repeat procedures, perforation possibly requiring surgical treatment, missed lesions and complications of sedation/general anesthesia (also explained by anesthesia staff)]. I have evaluated the patient for risks associated with the planned anesthesia and the procedure to be performed and find the patient an acceptable candidate for IV sedation.Multiple opportunities were provided for any questions or concerns, and all questions were answered satisfactorily before any anesthesia was administered. We will proceed with the planned procedure.br

## 2024-06-07 ENCOUNTER — LAB (OUTPATIENT)
Dept: LAB | Facility: HOSPITAL | Age: 64
End: 2024-06-07
Payer: COMMERCIAL

## 2024-06-07 ENCOUNTER — OFFICE VISIT (OUTPATIENT)
Dept: ONCOLOGY | Facility: CLINIC | Age: 64
End: 2024-06-07
Payer: COMMERCIAL

## 2024-06-07 VITALS
RESPIRATION RATE: 18 BRPM | TEMPERATURE: 98.1 F | HEIGHT: 69 IN | SYSTOLIC BLOOD PRESSURE: 122 MMHG | HEART RATE: 56 BPM | BODY MASS INDEX: 30.87 KG/M2 | DIASTOLIC BLOOD PRESSURE: 80 MMHG | WEIGHT: 208.4 LBS | OXYGEN SATURATION: 99 %

## 2024-06-07 DIAGNOSIS — I82.4Z1 ACUTE DEEP VEIN THROMBOSIS (DVT) OF DISTAL VEIN OF RIGHT LOWER EXTREMITY: ICD-10-CM

## 2024-06-07 DIAGNOSIS — Z86.711 HISTORY OF PULMONARY EMBOLISM: ICD-10-CM

## 2024-06-07 DIAGNOSIS — R76.0 ANTICARDIOLIPIN ANTIBODY POSITIVE: Primary | ICD-10-CM

## 2024-06-07 DIAGNOSIS — R76.0 ANTICARDIOLIPIN ANTIBODY POSITIVE: ICD-10-CM

## 2024-06-07 DIAGNOSIS — Z79.01 CURRENT USE OF LONG TERM ANTICOAGULATION: ICD-10-CM

## 2024-06-07 LAB
ALBUMIN SERPL-MCNC: 4.3 G/DL (ref 3.5–5.2)
ALBUMIN/GLOB SERPL: 1.4 G/DL
ALP SERPL-CCNC: 94 U/L (ref 39–117)
ALT SERPL W P-5'-P-CCNC: 29 U/L (ref 1–41)
ANION GAP SERPL CALCULATED.3IONS-SCNC: 13.7 MMOL/L (ref 5–15)
AST SERPL-CCNC: 57 U/L (ref 1–40)
BASOPHILS # BLD AUTO: 0.03 10*3/MM3 (ref 0–0.2)
BASOPHILS NFR BLD AUTO: 0.5 % (ref 0–1.5)
BILIRUB SERPL-MCNC: 0.7 MG/DL (ref 0–1.2)
BUN SERPL-MCNC: 15 MG/DL (ref 8–23)
BUN/CREAT SERPL: 17 (ref 7–25)
CALCIUM SPEC-SCNC: 9.2 MG/DL (ref 8.6–10.5)
CHLORIDE SERPL-SCNC: 104 MMOL/L (ref 98–107)
CO2 SERPL-SCNC: 23.3 MMOL/L (ref 22–29)
CREAT SERPL-MCNC: 0.88 MG/DL (ref 0.76–1.27)
DEPRECATED RDW RBC AUTO: 41.6 FL (ref 37–54)
EGFRCR SERPLBLD CKD-EPI 2021: 97.2 ML/MIN/1.73
EOSINOPHIL # BLD AUTO: 0.11 10*3/MM3 (ref 0–0.4)
EOSINOPHIL NFR BLD AUTO: 1.7 % (ref 0.3–6.2)
ERYTHROCYTE [DISTWIDTH] IN BLOOD BY AUTOMATED COUNT: 12.6 % (ref 12.3–15.4)
GLOBULIN UR ELPH-MCNC: 3 GM/DL
GLUCOSE SERPL-MCNC: 89 MG/DL (ref 65–99)
HCT VFR BLD AUTO: 45.3 % (ref 37.5–51)
HGB BLD-MCNC: 14.9 G/DL (ref 13–17.7)
IMM GRANULOCYTES # BLD AUTO: 0.02 10*3/MM3 (ref 0–0.05)
IMM GRANULOCYTES NFR BLD AUTO: 0.3 % (ref 0–0.5)
LYMPHOCYTES # BLD AUTO: 1.42 10*3/MM3 (ref 0.7–3.1)
LYMPHOCYTES NFR BLD AUTO: 21.4 % (ref 19.6–45.3)
MCH RBC QN AUTO: 29.8 PG (ref 26.6–33)
MCHC RBC AUTO-ENTMCNC: 32.9 G/DL (ref 31.5–35.7)
MCV RBC AUTO: 90.6 FL (ref 79–97)
MONOCYTES # BLD AUTO: 0.42 10*3/MM3 (ref 0.1–0.9)
MONOCYTES NFR BLD AUTO: 6.3 % (ref 5–12)
NEUTROPHILS NFR BLD AUTO: 4.63 10*3/MM3 (ref 1.7–7)
NEUTROPHILS NFR BLD AUTO: 69.8 % (ref 42.7–76)
NRBC BLD AUTO-RTO: 0 /100 WBC (ref 0–0.2)
PLATELET # BLD AUTO: 182 10*3/MM3 (ref 140–450)
PMV BLD AUTO: 9.1 FL (ref 6–12)
POTASSIUM SERPL-SCNC: 3.8 MMOL/L (ref 3.5–5.2)
PROT SERPL-MCNC: 7.3 G/DL (ref 6–8.5)
RBC # BLD AUTO: 5 10*6/MM3 (ref 4.14–5.8)
SODIUM SERPL-SCNC: 141 MMOL/L (ref 136–145)
WBC NRBC COR # BLD AUTO: 6.63 10*3/MM3 (ref 3.4–10.8)

## 2024-06-07 PROCEDURE — 36415 COLL VENOUS BLD VENIPUNCTURE: CPT

## 2024-06-07 PROCEDURE — 99214 OFFICE O/P EST MOD 30 MIN: CPT | Performed by: INTERNAL MEDICINE

## 2024-06-07 PROCEDURE — 85025 COMPLETE CBC W/AUTO DIFF WBC: CPT

## 2024-06-07 PROCEDURE — 80053 COMPREHEN METABOLIC PANEL: CPT

## 2024-06-07 NOTE — PROGRESS NOTES
Subjective       REASONS FOR FOLLOWUP:1  MASSIVE PULMONARY EMBOLISM WITH SEVERE PULMONARY HYPERTENSION REQUIRING CATHETER EMBOLECTOMY  AND SUBSEQUENT ANTICOAGULATION WITH XARELTO  2. DVT RLE AFTER LONG DRIVE 20 HS TO [place].  3. POSITIVE ANTICARDIOLIPIN ANTIBODY IGM. NEGATIVE LUPUS ANTICOAGULANT.    HISTORY OF PRESENT ILLNESS:    On 06/07/2024 this 63-year-old male returns to the office for follow up. He remains chronically anticoagulated with Xarelto after he had a massive pulmonary embolism with severe pulmonary hypertension requiring catheter embolectomy and anticoagulation with Xarelto since then. He has had also anticardiolipin antibody positivity. At this time the patient actually feels terrific. He just completed a colonoscopy yesterday with no complications. He has not had any new evidence of thrombosis and he has not had postphlebitic syndrome in the leg that triggered the blood clots. He has not had any other new health issues in the last year and he remains tremendously physically fit. Otherwise no other new health problems.             HEMATOLOGIC HISTORY:  Obviously the story of this patient is very typical. He drove to [place] on his own for 20 hours, not stopping. He developed pain in the calf of the right lower extremity that he called charley horse. He has shortness of breath presumed to be related to altitude sickness in [place] and upon returning to the [place] the shortness of breath remained ongoing and became worse. Since then he has had times that he has been able to do his workouts with no major difficulties and later on his workouts have been minimized given the shortness of breath produced by minimal physical activity. A few days ago he had a new pain in the right calf close to the Achilles tendon. While mowing the grass yesterday he had syncope and very likely the explanation of all this is his pulmonary vasculature could not accommodate anymore blood clots and the flow through the  left ventricle from the pulmonary veins was minimized producing syncope. The patient is alive by miracle. The procedure done by Dr. Cantu and stated above is more than dramatic in the description and tells us the amount of clots that this patient had. I pointed out to the patient that it is a miracle that he stayed alive. Many people go through this and they do not live to tell a story. Therefore, if somebody needs to have flowers and chocolates, it is Dr. Cantu and the emergency room team at [facility]. I think the quick transition and the care that this patient received is world quality and deserves to be credited.      The patient has no personal history of thrombophilia before. He has no family history of thrombophilia. He has not been taking anabolic steroids. His white count, hemoglobin and platelets are normal. His platelet count is minimally low because of platelet consumption associated with massive amount of clotting. Chemistry profile is normal. The CT angiogram from [facility] has been reviewed. Eventually we will need to ask the radiologist to amend the report because he mentioned something in the renal artery that probably is a type error. The description by Dr. Cantu has been reviewed.      The patient is already receiving Eliquis and I advised him that he will remain on Eliquis at least for a year and in my opinion probably for the rest of his life. Obviously we need to proceed with thrombophilia assessment but I think in my opinion the most likely factor that triggered all this was the long trip to [place]. He probably has had showers and showers and showers of clots and emboli and his pulmonary circulation could not accommodate anymore clots. He ran out of physical space to put more clots and the circulation into the left side of the heart through the pulmonary veins ran out. I pointed out to him that it is like if the main water entrance of the water to the house has  a clog, the rest of the house is not going to have any water. In any event the patient is up and running. He looks terrific. He has no clinical evidence of pulmonary hypertension on clinical examination and it is amazing that he is alive. I think the tolerance to all this is because he is a very fit individual who exercises all the time.      Obviously I am going to need to proceed with thrombophilia assessment that will include factor V Leiden mutation, prothrombin gene mutation, antithrombin III, lupus anticoagulant, anti-glycoprotein antibody profile, anticardiolipin antibody profile as well as protein C, protein S, fibrinogen, lipid profile as well as lupus anticoagulant. I will perform as well a serum protein electrophoresis and serum free light chains.      The patient has had screening colonoscopy in the past, prostate examination being negative normal. No family history of malignancy. No family history of thrombophilia. I need to see this patient in the office in a couple of weeks to go through the report of his laboratory testing. Eventually we will need to repeat a CT angiogram of the chest to be sure that all of his clots in the pulmonary circulation are clean and I am sure Dr. Cantu eventually will perform another echocardiogram to be sure that there is resolution of his pulmonary hypertension by this methodology.      I pointed out to the patient that he will need to stay away from trauma and I made the description in the common sense utilization at this point.      The patient likes to drink his beers. He does not get drunk but I pointed out that anticoagulants are not good friends of alcohol and his wife will take care of this problem.      He raised the question if sex life will have anything to do with all these issues in regard to inability to perform and I pointed out to him that should not be an issue. I asked him to give himself a break until things get better in the next couple of weeks.     The patient had a history of going to [place] to take a car to his daughter, driving for 20 hours almost nonstop and he developed swelling and pain in the right lower extremity that continued intermittently after he culminated all the issues pertinent to above. My notes from the original consultation were very explicit and described the history of the present illness extremely well in my opinion. In any event, since the patient was discharged from the hospital, he has been getting better and better as time goes by. He has very occasional sensation of flip flop in his heart but no sensation of persistent tachyarrhythmia. No chest pain, no pleuritic pain, no hemoptysis, no cough, no shortness of breath. His appetite has been terrific. He has not had any diarrhea like he was having before and he has no abdominal pain, distention or jaundice. His urination is normal. He has had significant improvement but not complete resolution of the swelling in the right lower extremity with no pain. He has not had any clinical bleeding. He feels extremely well. He has not had any fever or infection. He reminded me that some of these events happened after he proceeded with his COVID vaccination weeks before the trip.    THROMBOPHILIA  CLINICAL AND LABORATORY REPORT:  DATE:    10 /4 /2021     DIAGNOSIS: MASSIVE PE REQUIRING EMBOLECTOMY. DVT RLE      KEY: P EQUAL TO POSITIVE  , N  EQUAL TO NEGATIVE. NA : NOT APLICABLE    PREPARED BY HAYLEY DICKERSON MD STAYS IN THE CHART PERMANENTLY      CLINICAL FACTORS:     OBESITY: ___N___     DIABETES: __N____.    IMMOBILITY: __N____.    SMOKING HISTORY : ______N______ PACK A DAY/ YEARS ________    LONG TRIPS BY CAR OR AIRPLANE: __YES 20 HS DRIVE TO [place]______    RECENT SURGERY: ____N______   LOCATION: ________    TRAUMA: ___N_____    MEDICATIONS:   BIRTH CONTROL MEDICATIONS ___N_____ ANDROGENS _N______ ESTROGENS ___N_____ HEPARIN __N______ OTHERS ____N___    PREGNANCY:   ____N____.    FAMILY  HISTORY OF BLOOD CLOTHS:    POSITIVE:   ________ NEGATIVE ___N____ on 3/21/2022 brother has pe after prostate cancer surgery    HEMATOLOGIC DISORDER:  POLYCYTHEMIA VERA ____N___ THROMBOCYTOSIS ___N___    DIC:___N____  TTP ___N_______    COLLAGEN VASCULAR DISEASE: _N____   DIAGNOSIS: ________________________.    VASCULITIS: TYPE AND LOCATION ____N___________ BIOPSY PROVEN:____________    CANCER DIAGNOSIS: __N_____    TYPE ________    CANCER SCREENING:  BREAST _______ COLON ___Y_____ LUNG ________ GENITOURINARY __Y_______   CT SCANS ___________ NORMAL ______ ABNORMAL ________    INDWELLING VASCULAR DEVICE:  ___N______. LOCATION _________    INFLAMMATORY BOWEL DISEASE: ULCERATIVE COLITIS ______N____   CHRON'S DISEASE ___________    RECENT COVID 19 INFECTION __N_____________.          LABORATORY:    FACTOR V LEIDEN MUTATION: POSITIVE   _____ NEGATIVE __Y___ HETEROZYGOUS ________ HOMOZYGOUS ________    PROTHROMBIN GENE MUTATION:  POSITIVE _____ NEGATIVE __Y___    ANTITHROMBIN III : NORMAL _____   ABNORMAL __Y____ QUANTIFICATION: __66_____    PROTEIN S ANTIGEN ____N______ PROTEIN S FUNCTIONAL _____N___ PROTEIN C ___N______     LUPUS ANTICOAGULANT: POSITIVE _____  NEGATIVE _N____. REPEATED  6 WEEKS LATER ______    ANTICARDIOLIPIN ANTIBODY PROFILE : POSITIVE ___Y___  NEGATIVE ______  IGG __N___ IGM _34____ REPEATED 6 WEEKS LATER _________    ANTI GLYCOPROTEIN ANTIBODY:  POSITIVE _____  NEGATIVE  __N_____   IGG _____ IGM _____ REPEATED 6 WEEKS LATER ________    ANTIPHOSPATIDIL SERINE ANTIBODY:  POSITIVE _____ NEGATIVE __N_____    SERUM PROTEIN ELECTROPHORESIS AND IMMUNOFIXATION: NO MONOCLONAL PROTEIN ___N_____ POSITIVE MONOCLONAL PROTEIN ______ TYPE _________    C REACTIVE PROTEIN HIGH SENSIBILITY: NORMAL _______ ABNORMAL :QUANTIFICATION ________    SEDIMENTATION RATE: _____ MM/H.    FIBRINOGEN LEVEL: _________ NORMAL __Y_____  ABNORMAL __________.    LIPID PROFILE:    CHOLESTEROL HIGH _____N_____  TRYGLYCERIDES HIGH   ___N_____    HEPARIN ASSOCIATED ANTIPLATELET ANTIBODY: __NA_______          Past Medical History:   Diagnosis Date    DVT (deep venous thrombosis)     RLE in the distal femoral, popliteal, posterior tibial, peroneal, and soleal.    Pulmonary emboli         Past Surgical History:   Procedure Laterality Date    APPENDECTOMY      CARDIAC CATHETERIZATION N/A 9/18/2021    Procedure: Percutaneous Manual Thrombectomy- INARI;  Surgeon: Enrike Cantu MD;  Location:  [facility];  Service: Cardiovascular;  Laterality: N/A;    CARDIAC CATHETERIZATION N/A 9/18/2021    Procedure: Right Heart Cath;  Surgeon: Enrike Cantu MD;  Location:  [facility];  Service: Cardiovascular;  Laterality: N/A;    COLONOSCOPY      negative normal    EMBOLECTOMY      INTERVENTIONAL RADIOLOGY PROCEDURE Bilateral 9/18/2021    Procedure: Pumonary angiography -Bilateral;  Surgeon: Enrike Cantu MD;  Location:  [facility];  Service: Cardiovascular;  Laterality: Bilateral;    TONSILLECTOMY          Current Outpatient Medications on File Prior to Visit   Medication Sig Dispense Refill    loratadine (CLARITIN) 10 MG tablet Take 1 tablet by mouth Daily.      rivaroxaban (Xarelto) 20 MG tablet TAKE 1 TABLET BY MOUTH EVERY DAY 90 tablet 2    Triamcinolone Acetonide (NASACORT) 55 MCG/ACT nasal inhaler 2 sprays into the nostril(s) as directed by provider Daily.      [DISCONTINUED] escitalopram (LEXAPRO) 20 MG tablet Take 1 tablet by mouth Daily.       No current facility-administered medications on file prior to visit.        ALLERGIES:    Allergies   Allergen Reactions    Penicillins Rash        Social History     Socioeconomic History    Marital status:      Spouse name: Marimar   Tobacco Use    Smoking status: Never    Smokeless tobacco: Never    Tobacco comments:     CAFFEINE -  2 CUPS COFFEE DAILY    Vaping Use    Vaping status: Never Used   Substance and Sexual Activity    Alcohol use: Yes      Alcohol/week: 6.0 standard drinks of alcohol     Types: 6 Cans of beer per week     Comment: occas on weekends    Drug use: Never    Sexual activity: Defer        Family History   Problem Relation Age of Onset    Hypertension Mother     Hypertension Father     Suicidality Brother     Hypertension Brother     Prostate cancer Brother     Autism Son           Objective     Vitals:    06/07/24 1257   PainSc: 0-No pain         6/7/2024    12:57 PM   Current Status   ECOG score 0       Physical Exam               GENERAL:  Well-developed, Patient  in no acute distress.   SKIN:  Warm, dry ,NO purpura ,no rash.  HEENT:  Pupils were equal and reactive to light and accomodation, conjunctivae noninjected,  normal visual acuity.   NECK:  Supple with good range of motion; no thyromegaly , no JVD or bruits,.No carotid artery pain, no carotid abnormal pulsation   LYMPHATICS:  No cervical, NO supraclavicular, NO axillary, NO inguinal adenopathies.  CARDIAC   normal rate , regular rhythm, without murmur,NO rubs NO S3 NO S4   LUNGS: normal breath sounds bilateral, no wheezing, NO rhonchi, NO crackles ,NO rubs.  VASCULAR VENOUS: no cyanosis, NO collateral circulation, NO varicosities, NO edema, NO palpable cords, NO pain,NO erythema, NO pigmentation of the skin.  ABDOMEN:  Soft, NO pain,no hepatomegaly, no splenomegaly,no masses, no ascites, no collateral circulation,no distention.  EXTREMITIES  AND SPINE:  No clubbing, no cyanosis ,no deformities , no pain .No kyphosis,  no pain in spine, no pain in ribs , no pain in pelvic bone.  NEUROLOGICAL:  Patient was awake, alert, oriented to time, person and place.      RECENT LABS:  Hematology WBC   Date Value Ref Range Status   06/07/2024 6.63 3.40 - 10.80 10*3/mm3 Final     RBC   Date Value Ref Range Status   06/07/2024 5.00 4.14 - 5.80 10*6/mm3 Final     Hemoglobin   Date Value Ref Range Status   06/07/2024 14.9 13.0 - 17.7 g/dL Final     Hematocrit   Date Value Ref Range Status    06/07/2024 45.3 37.5 - 51.0 % Final     Platelets   Date Value Ref Range Status   06/07/2024 182 140 - 450 10*3/mm3 Final       CBC:    WBC   Date Value Ref Range Status   06/07/2024 6.63 3.40 - 10.80 10*3/mm3 Final     RBC   Date Value Ref Range Status   06/07/2024 5.00 4.14 - 5.80 10*6/mm3 Final     Hemoglobin   Date Value Ref Range Status   06/07/2024 14.9 13.0 - 17.7 g/dL Final     Hematocrit   Date Value Ref Range Status   06/07/2024 45.3 37.5 - 51.0 % Final     MCV   Date Value Ref Range Status   06/07/2024 90.6 79.0 - 97.0 fL Final     MCH   Date Value Ref Range Status   06/07/2024 29.8 26.6 - 33.0 pg Final     MCHC   Date Value Ref Range Status   06/07/2024 32.9 31.5 - 35.7 g/dL Final     RDW   Date Value Ref Range Status   06/07/2024 12.6 12.3 - 15.4 % Final     RDW-SD   Date Value Ref Range Status   06/07/2024 41.6 37.0 - 54.0 fl Final     MPV   Date Value Ref Range Status   06/07/2024 9.1 6.0 - 12.0 fL Final     Platelets   Date Value Ref Range Status   06/07/2024 182 140 - 450 10*3/mm3 Final     Neutrophil %   Date Value Ref Range Status   06/07/2024 69.8 42.7 - 76.0 % Final     Lymphocyte %   Date Value Ref Range Status   06/07/2024 21.4 19.6 - 45.3 % Final     Monocyte %   Date Value Ref Range Status   06/07/2024 6.3 5.0 - 12.0 % Final     Eosinophil %   Date Value Ref Range Status   06/07/2024 1.7 0.3 - 6.2 % Final     Basophil %   Date Value Ref Range Status   06/07/2024 0.5 0.0 - 1.5 % Final     Immature Grans %   Date Value Ref Range Status   06/07/2024 0.3 0.0 - 0.5 % Final     Neutrophils, Absolute   Date Value Ref Range Status   06/07/2024 4.63 1.70 - 7.00 10*3/mm3 Final     Lymphocytes, Absolute   Date Value Ref Range Status   06/07/2024 1.42 0.70 - 3.10 10*3/mm3 Final     Monocytes, Absolute   Date Value Ref Range Status   06/07/2024 0.42 0.10 - 0.90 10*3/mm3 Final     Eosinophils, Absolute   Date Value Ref Range Status   06/07/2024 0.11 0.00 - 0.40 10*3/mm3 Final     Basophils, Absolute    Date Value Ref Range Status   06/07/2024 0.03 0.00 - 0.20 10*3/mm3 Final     Immature Grans, Absolute   Date Value Ref Range Status   06/07/2024 0.02 0.00 - 0.05 10*3/mm3 Final     nRBC   Date Value Ref Range Status   06/07/2024 0.0 0.0 - 0.2 /100 WBC Final        CMP:    Glucose   Date Value Ref Range Status   06/09/2023 93 74 - 124 mg/dL Final     BUN   Date Value Ref Range Status   06/09/2023 20 6 - 20 mg/dL Final     Creatinine   Date Value Ref Range Status   06/09/2023 0.93 0.70 - 1.30 mg/dL Final     Sodium   Date Value Ref Range Status   06/09/2023 140 134 - 145 mmol/L Final     Potassium   Date Value Ref Range Status   06/09/2023 4.0 3.5 - 4.7 mmol/L Final     Chloride   Date Value Ref Range Status   06/09/2023 103 98 - 107 mmol/L Final     CO2   Date Value Ref Range Status   06/09/2023 21.7 (L) 22.0 - 29.0 mmol/L Final     Calcium   Date Value Ref Range Status   06/09/2023 9.7 8.5 - 10.2 mg/dL Final     Total Protein   Date Value Ref Range Status   06/09/2023 7.8 6.3 - 8.0 g/dL Final     Albumin   Date Value Ref Range Status   06/09/2023 4.3 3.5 - 5.2 g/dL Final     ALT (SGPT)   Date Value Ref Range Status   06/09/2023 27 0 - 41 U/L Final     AST (SGOT)   Date Value Ref Range Status   06/09/2023 38 0 - 40 U/L Final     Alkaline Phosphatase   Date Value Ref Range Status   06/09/2023 95 38 - 116 U/L Final     Total Bilirubin   Date Value Ref Range Status   06/09/2023 0.8 0.2 - 1.2 mg/dL Final     Globulin   Date Value Ref Range Status   06/09/2023 3.5 1.8 - 3.5 gm/dL Final     A/G Ratio   Date Value Ref Range Status   06/09/2023 1.2 1.1 - 2.4 g/dL Final     BUN/Creatinine Ratio   Date Value Ref Range Status   06/09/2023 21.5 7.3 - 30.0 Final     Anion Gap   Date Value Ref Range Status   06/09/2023 15.3 (H) 5.0 - 15.0 mmol/L Final           Recent imaging:    No results found.     Assessment & Plan     Diagnoses and all orders for this visit:    1. Anticardiolipin antibody positive (Primary)    2. History of  pulmonary embolism    3. Acute deep vein thrombosis (DVT) of distal vein of right lower extremity         The patient was brought back to the office on 10/04/2021. Since the hospital stay he has been terrific at home. He has gained back his mobility and his ability to function with no limitations. He has had near complete resolution of the swelling and pain in the right calf and right lower extremity and he has not had any shortness of breath, palpitations, pleuritic pain, cough, hemoptysis. He occasional has the sensation of the heart that flip flops and lasts for 1 or 2 seconds in absence of any pain.     He has not had any clinical bleeding on the Eliquis.     His clinical examination today besides minimal residual swelling in the right lower extremity shows no other abnormalities and there is no evidence clinically of pulmonary hypertension.     The thrombophilia labs have documented the only abnormality, an anticardiolipin antibody IgM that was marginally positive. The rest of the studies as posted above in the hematological history were negative. This leads me to conclude that the most likely factor that triggered the clot in this patient was the positive anticardiolipin antibody but most importantly the long drive to [place], 20 hours in a car. The patient stopped on 2 occasions for gasoline and food and that was the rest of the story. He developed swelling on the trip in the right lower extremity and he kept having swelling and pain in the calf for many weeks after that having very likely frequent showers of emboli that filled up the pulmonary circulation and culminated with his syncopal episode while mowing grass.     The patient's clinical examination today otherwise is unrevealing. He feels great. He also has resolution of the thrombocytopenia that he had in the hospital that is probably associated with excessive platelet consumption but massive amount of clots. The platelet count, hemoglobin and white  count today are completely normal.     My recommendations for this patient are as follows:   1. He will remain on his Eliquis for the time being and I have recommended to the patient to remain on this medicine for the rest of his life. I feel afraid of this patient having another episode and I feel afraid of this patient having any other issues that would culminate with more complications or problems associated with this process.   2. I advised the patient to have a repeat CT angiogram of the chest and Doppler study of the right lower extremity in 5 weeks and to repeat the level of antithrombin III as well as the anticardiolipin antibody in 5 weeks. I would like to review him back in 6 weeks.   3. I advised him to use elastic support in the right lower extremity if possible all the time. He does not need to use this at nighttime.   4. I advised him against any potential for trauma to minimize bleeding associated with anticoagulant use.   5. I advised him to resume his beer drinking that he loves to do. I asked him that he needs to do this even with more than moderation. Maybe a beer here and there and he is not allowed to get tipsy. He is aware that too much alcohol and anticoagulants are not good friends from the point of view of falling, trauma and circumstances of that nature.     The patient raised the question about his sex life and I told him that I think he can resume this according to his circumstances with no significant limitations.     I discussed all these facts with the patient. I provided him copies of the laboratory testing and I discussed this with the patient’s wife.     They raised the question if I do believe that the COVID vaccination had something to do with this. He received this a few weeks before the trip to [place]. Maybe that was a triggering factor for anticardiolipin antibodies. I am not sure about the answer that I need to do in this regard. He received Pfizer vaccination. I advised him  though in the home run to avoid the use of a 2nd dose on himself at this point until we clarify and remove more smoke out of the air.  I reviewed the patient on 11/15/2021. Since the previous visit he has had complete resolution of the symptoms pertinent to his pulmonary embolism. He has had a normal clinical examination today. He has had minimal leftover edema in the right lower extremity that is barely visible to my eyes today.    The CT angiogram of the chest shows complete resolution of his massive amount of pulmonary embolism and there is no evidence of pulmonary hypertension.     The Doppler study of the lower extremity documents a subacute clot in the popliteal vein on the right lower extremity. No other sites of clots.     The patient was sent back to the lab today to obtain his laboratory testing that includes a CBC, anticardiolipin antibody and glycoprotein antibodies as well as antithrombin-III.          I had a lengthy conversation with the patient today in discussion with him and his wife present in the room in regard issues pertinent to anticoagulants. I strongly believe that this patient needs to remain on anticoagulant for the rest of his life given the gravity of the clinical picture that he presented with the first time. Maybe in the future it will be amenable for us to decrease the degree of anticoagulant utilization. His insurance company has already notified him that for next year they will not pay for Eliquis that will obligate us to make the transition from Eliquis to Xarelto. I do not have any difficulties with this. The patient has tablets of Eliquis to last for another 3-4 weeks. I asked him to go ahead and continue taking them and once that he is done with the last dose of Eliquis he will initiate Xarelto at the dose of 20 mg once a day.     I pointed out to the patient that he needs to wear elastic support in his right lower extremity no matter what the situation is. I asked him to stay  away from extensive amount of alcohol consumption and minimize trauma.     The patient returned to the office on 03/21/2022. Since the previous visit he has been feeling terrific. He is back to his normal exercise activity with no limitations whatsoever. He is able to do anything that he needs to do physically wise and he has no sensation of shortness of breath, chest pain or palpitation. There is no postphlebitic syndrome in the right lower extremity. The patient remains taking his Eliquis in a normal schedule with no clinical bleeding.     The patient raised the question and his wife sent the message in regard to the correlation between COVID testing and blood clots. Typically the association is related to [employer] vaccine, not related to the Moderna or Pfizer vaccine. Astra [employer] that is not utilized in [place] also has some of those concerns in regard to clots. Mostly happens in women. Again my concern in regard to his thrombophilia is based more in regard to the time of traveling 20 hours in a car and the subsequent self neglect in regard to recognizing the symptoms of shortness of breath that produce many showers of pulmonary emboli that ended up with severe pulmonary hypertension and syncope. He required removal of clots and thrombolytic therapy. He is alive because of a miracle in my opinion and his neighbors who pay attention to his actions outside on the day that the issue happened.    He raised the question in regard for how long he needs to be anticoagulated. I told him that given the intensity of his process in my opinion is that probably for life but he does not want to listen to this answer yet. My approach will be eventually to do a D-dimer that entire literature has been documented to be predictive of future events if the test is abnormal before any anticoagulation is discontinued.     I advised him again to avoid trauma at any cost remaining on anticoagulation and I asked him to return to  see us back in 6 months.     If he wishes to have a booster shot for COVID he is welcome to do that either receiving the Pfizer vaccine or Moderna vaccine that is my recommendation.     I advised him also to remain physically active like he is. He is a fan of exercise and I asked him to continue to work on this.    Other than that no other significant recommendation for him at this point. He brought up to me the story that his brother had prostate cancer surgery recently and he ended up with a pulmonary embolism. We have done the thrombophilia assessment for family and all of the testing posted above was negative.   On 10/24/2022 the patient was further reviewed. On clinical examination he is completely normal with no symptoms or signs of any occlusive venous disease or complications from his dramatic pulmonary embolism that he was safe from before with medicines and procedures. The patient is back to baseline. He is doing exercise and he is staying away from red meat completely. He has not encountered any problems with his anticoagulant that he takes religiously on schedule with no clinical bleeding. He also questions if he needs to remain on the anticoagulant continuously and I mentioned to him that given the severity of his process he probably will need to remain on the medicine for the time being.     As we know we have to test on him anticardiolipin antibody and anti-glycoprotein antibodies and lupus anticoagulant periodically and we have a set of these studies pending today. A lipid profile that was done by the end of last year also documented significant elevation of cholesterol and triglycerides and I discussed with him and his wife the need to have a repeat lipid test today.     The patient's brother has a terrible lipid profile. He is hypertensive. He has had thrombophilia as well before.     My advice to the patient is as follows:   1. He will remain on anticoagulant for the time being.   2. He will avoid  trauma at any cost to minimize bleeding.   3. He will return in 6 months for a repeat CBC, CMP, lipid profile, anticardiolipin antibody profile, lupus anticoagulant.   4. The patient was advised in regard to the phone call that he will get once that we get the report of the laboratory testing requested above.     I discussed these facts with the patient present in the room. He requested a conversation with his wife via FaceTime that we performed over 10 minutes with her giving her explanations about why he needs to remain on anticoagulant and the need for him to protect himself from trauma.     As usual the patient loves his beer and drinks his beer and I pointed out to him that moderation is the rule of the day, 2-3 beers together would not affect too much but more than that can have terrible consequences of his health not only in the future but also in the present with the potential for car accidents, personal falls and many other things that can happen under those circumstances. He is aware of that and he will try to limit this issue in the best way that he can.  On 06/09/2023 the patient was further reviewed. Since the previous visit he has not had any episodes of clots, he has not developed any postphlebitic syndrome or any long-term consequences of pulmonary hypertension in regard to his pulmonary embolism. He remains on Xarelto, he is compliant with the medicine 100%. He had clinical bleeding after he caught one of his fingers on a piece of metal and he realized how active bleeding can become under these circumstances.     Today his white count, hemoglobin, platelets and white count differential are normal. I advised him to remain on his Xarelto for the time being 2 mg a day, no plans to stop anticoagulation at anytime soon. He is advised one more time the avoidance of trauma while taking anticoagulants. He already had an experience posted above.     We know that the patient has had an anticardiolipin  antibody IgM with a lupus anticoagulant negative. He is not one of those patients who have triple positive anticardiolipin antibody positivity. For this reason Xarelto remains the way to go. He has no need for Coumadin under the present circumstances. He will be called at home with the report of this antibody testing done today.    We will review him back in a year with a CBC, CMP and repeat anticardiolipin antibody testing and lupus anticoagulant and antiglycoprotein antibody at that time.   On 06/07/2024 the patient looks terrific. He has not had any other episodes of thrombosis, no postphlebitic syndrome in the lower extremity and no clinical bleeding with the Xarelto. His lung and heart examinations today are normal. His abdominal exam is normal. His abdominal wall hernia is unchanged. He completed the colonoscopy yesterday with no complications. Polyps were removed. Pathology of this is pending.     His white count, hemoglobin and platelets are normal, his chemistry profile is pending, anticardiolipin antibodies will be available next week and report will be called to him.     My advice to him is the same, continue anticoagulation with Xarelto 20 mg a day, no plans to stop and continue monitoring him on a yearly basis. A CBC, anticardiolipin antibody and CMP will be done next year.     One more time I advised him against any potential for trauma.

## 2024-06-08 LAB
CARDIOLIPIN IGG SER IA-ACNC: <9 GPL U/ML (ref 0–14)
CARDIOLIPIN IGM SER IA-ACNC: 11 MPL U/ML (ref 0–12)

## 2024-06-09 LAB
B2 GLYCOPROT1 IGA SER-ACNC: <9 GPI IGA UNITS (ref 0–25)
B2 GLYCOPROT1 IGG SER-ACNC: <9 GPI IGG UNITS (ref 0–20)
B2 GLYCOPROT1 IGM SER-ACNC: <9 GPI IGM UNITS (ref 0–32)
LA 2 SCREEN W REFLEX-IMP: NORMAL
SCREEN APTT: 33.8 SEC (ref 0–43.5)
SCREEN DRVVT: 40 SEC (ref 0–47)

## 2024-06-10 ENCOUNTER — TELEPHONE (OUTPATIENT)
Dept: ONCOLOGY | Facility: CLINIC | Age: 64
End: 2024-06-10
Payer: COMMERCIAL

## 2024-06-10 NOTE — TELEPHONE ENCOUNTER
----- Message from Adonay Washington sent at 6/10/2024  8:02 AM EDT -----  Call his anticardiolipin antibody is negative great

## 2024-06-10 NOTE — TELEPHONE ENCOUNTER
Called patient and relayed message via voicemail with call back number should he have any questions. [phone]. Allegra Cabrera RN

## 2024-12-10 ENCOUNTER — OFFICE VISIT (OUTPATIENT)
Dept: CARDIOLOGY | Facility: CLINIC | Age: 64
End: 2024-12-10
Payer: COMMERCIAL

## 2024-12-10 VITALS
BODY MASS INDEX: 29.68 KG/M2 | HEIGHT: 69 IN | DIASTOLIC BLOOD PRESSURE: 88 MMHG | SYSTOLIC BLOOD PRESSURE: 132 MMHG | HEART RATE: 57 BPM | WEIGHT: 200.4 LBS

## 2024-12-10 DIAGNOSIS — R76.0 ANTICARDIOLIPIN ANTIBODY POSITIVE: Primary | ICD-10-CM

## 2024-12-10 DIAGNOSIS — Z86.711 HISTORY OF PULMONARY EMBOLISM: ICD-10-CM

## 2024-12-10 PROCEDURE — 99213 OFFICE O/P EST LOW 20 MIN: CPT | Performed by: INTERNAL MEDICINE

## 2024-12-10 PROCEDURE — 93000 ELECTROCARDIOGRAM COMPLETE: CPT | Performed by: INTERNAL MEDICINE

## 2024-12-10 NOTE — PROGRESS NOTES
Date of Office Visit: 12/10/24  Encounter Provider: Enrike Cantu MD  Place of Service: [facility] CARDIOLOGY  Patient Name: Christopher Gupta  :1960  [id number]    Chief Complaint   Patient presents with    Pulmonary Embolism   :     HPI: Christopher Gupta is a 64 y.o. male he came in with syncope and a submassive pulmonary embolus he has undergone successful and artery this was in 2020 and is really done very well since then no real symptoms since then no bleeding difficulty he is anticoagulated with Eliquis he did see hematology.  Turns out he was weakly positive for antiphospholipid antibody.  He has seen Dr. Velez who recommended lifelong anticoagulation.      He is here for follow-up he is doing great no breathing issues no bleeding difficulty not had any further clots is still followed by hematology.  He actually looks like an action figure he is really lost a lot of weight and what he has is muscle which is great    Past Medical History:   Diagnosis Date    DVT (deep venous thrombosis)     RLE in the distal femoral, popliteal, posterior tibial, peroneal, and soleal.    Pulmonary emboli        Past Surgical History:   Procedure Laterality Date    APPENDECTOMY      CARDIAC CATHETERIZATION N/A 2021    Procedure: Percutaneous Manual Thrombectomy- INARI;  Surgeon: Enrike Cantu MD;  Location: [facility] CATH INVASIVE LOC[facility];  Service: Cardiovascular;  Laterality: N/A;    CARDIAC CATHETERIZATION N/A 2021    Procedure: Right Heart Cath;  Surgeon: Enrike Cantu MD;  Location: [facility] CATH INVASIVE LOCATION;  Service: Cardiovascular;  Laterality: N/A;    COLONOSCOPY      negative normal    EMBOLECTOMY      INTERVENTIONAL RADIOLOGY PROCEDURE Bilateral 2021    Procedure: Pumonary angiography -Bilateral;  Surgeon: Enrike Cantu MD;  Location: [facility] CATH INVASIVE LOCATION;  Service: Cardiovascular;  Laterality: Bilateral;    TONSILLECTOMY         Social  History     Socioeconomic History    Marital status:      Spouse name: Marimar   Tobacco Use    Smoking status: Never    Smokeless tobacco: Never    Tobacco comments:     CAFFEINE -  2 CUPS COFFEE DAILY    Vaping Use    Vaping status: Never Used   Substance and Sexual Activity    Alcohol use: Yes     Alcohol/week: 6.0 standard drinks of alcohol     Types: 6 Cans of beer per week     Comment: occas on weekends    Drug use: Never    Sexual activity: Defer       Family History   Problem Relation Age of Onset    Hypertension Mother     Hypertension Father     Suicidality Brother     Hypertension Brother     Prostate cancer Brother     Autism Son        Review of Systems   Constitutional: Negative for decreased appetite, fever, malaise/fatigue and weight loss.   HENT:  Negative for nosebleeds.    Eyes:  Negative for double vision.   Cardiovascular:  Negative for chest pain, claudication, cyanosis, dyspnea on exertion, irregular heartbeat, leg swelling, near-syncope, orthopnea, palpitations, paroxysmal nocturnal dyspnea and syncope.   Respiratory:  Negative for cough, hemoptysis and shortness of breath.    Hematologic/Lymphatic: Negative for bleeding problem.   Skin:  Negative for rash.   Musculoskeletal:  Negative for falls and myalgias.   Gastrointestinal:  Negative for hematochezia, jaundice, melena, nausea and vomiting.   Genitourinary:  Negative for hematuria.   Neurological:  Negative for dizziness and seizures.   Psychiatric/Behavioral:  Negative for altered mental status and memory loss.        Allergies   Allergen Reactions    Penicillins Rash         Current Outpatient Medications:     loratadine (CLARITIN) 10 MG tablet, Take 1 tablet by mouth Daily., Disp: , Rfl:     rivaroxaban (Xarelto) 20 MG tablet, TAKE 1 TABLET BY MOUTH EVERY DAY, Disp: 90 tablet, Rfl: 2    Triamcinolone Acetonide (NASACORT) 55 MCG/ACT nasal inhaler, Administer 2 sprays into the nostril(s) as directed by provider Daily., Disp: , Rfl:  "      Objective:     Vitals:    12/10/24 1344   BP: 132/88   Pulse: 57   Weight: 90.9 kg (200 lb 6.4 oz)   Height: 175.3 cm (69\")     Body mass index is 29.59 kg/m².    Constitutional:       Appearance: Well-developed.   Eyes:      General: No scleral icterus.  HENT:      Head: Normocephalic.   Neck:      Thyroid: No thyromegaly.      Vascular: No JVD.      Lymphadenopathy: No cervical adenopathy.   Pulmonary:      Effort: Pulmonary effort is normal.      Breath sounds: Normal breath sounds. No wheezing. No rales.   Cardiovascular:      Normal rate. Regular rhythm.      No gallop.    Edema:     Peripheral edema absent.   Abdominal:      Palpations: Abdomen is soft.      Tenderness: There is no abdominal tenderness.   Musculoskeletal: Normal range of motion. Skin:     General: Skin is warm and dry.      Findings: No rash.   Neurological:      Mental Status: Alert and oriented to person, place, and time.           ECG 12 Lead    Date/Time: 12/10/2024 2:20 PM  Performed by: Enrike Cantu MD    Authorized by: Enrike Cantu MD  Comparison: compared with previous ECG   Similar to previous ECG  Rhythm: sinus rhythm    Clinical impression: normal ECG           Assessment:       Diagnosis Plan   1. Anticardiolipin antibody positive        2. History of pulmonary embolism                 Plan:       He is doing well I would not really recommend any change I think we could consider maybe putting him on low-dose Xarelto but other than that I think he is doing great he is good to have him come back and see us as needed    As always, it has been a pleasure to participate in your patient's care.      Sincerely,       Enrike Cantu MD                  "

## 2025-06-16 NOTE — PROGRESS NOTES
Chief Complaint   Patient presents with    Follow-up     6/17/2025, interval history  Patient new to me.  Previous Dr. Washington patient.  Currently on Xarelto 20 mg daily.  Tolerating it well.  No bleeding issues.  He is accompanied by his wife today.      HISTORY OF PRESENT ILLNESS:    On 06/07/2024 this 63-year-old male returns to the office for follow up. He remains chronically anticoagulated with Xarelto after he had a massive pulmonary embolism with severe pulmonary hypertension requiring catheter embolectomy and anticoagulation with Xarelto since then. He has had also anticardiolipin antibody positivity. At this time the patient actually feels terrific. He just completed a colonoscopy yesterday with no complications. He has not had any new evidence of thrombosis and he has not had postphlebitic syndrome in the leg that triggered the blood clots. He has not had any other new health issues in the last year and he remains tremendously physically fit. Otherwise no other new health problems.       HEMATOLOGIC HISTORY:  Obviously the story of this patient is very typical. He drove to [place] on his own for 20 hours, not stopping. He developed pain in the calf of the right lower extremity that he called charley horse. He has shortness of breath presumed to be related to altitude sickness in [place] and upon returning to the [place] the shortness of breath remained ongoing and became worse. Since then he has had times that he has been able to do his workouts with no major difficulties and later on his workouts have been minimized given the shortness of breath produced by minimal physical activity. A few days ago he had a new pain in the right calf close to the Achilles tendon. While mowing the grass yesterday he had syncope and very likely the explanation of all this is his pulmonary vasculature could not accommodate anymore blood clots and the flow through the left ventricle from the pulmonary veins was minimized  producing syncope. The patient is alive by miracle. The procedure done by Dr. Cantu and stated above is more than dramatic in the description and tells us the amount of clots that this patient had. I pointed out to the patient that it is a miracle that he stayed alive. Many people go through this and they do not live to tell a story. Therefore, if somebody needs to have flowers and chocolates, it is Dr. Cantu and the emergency room team at [facility]. I think the quick transition and the care that this patient received is world quality and deserves to be credited.      The patient has no personal history of thrombophilia before. He has no family history of thrombophilia. He has not been taking anabolic steroids. His white count, hemoglobin and platelets are normal. His platelet count is minimally low because of platelet consumption associated with massive amount of clotting. Chemistry profile is normal. The CT angiogram from [facility] has been reviewed. Eventually we will need to ask the radiologist to amend the report because he mentioned something in the renal artery that probably is a type error. The description by Dr. Cantu has been reviewed.      The patient is already receiving Eliquis and I advised him that he will remain on Eliquis at least for a year and in my opinion probably for the rest of his life. Obviously we need to proceed with thrombophilia assessment but I think in my opinion the most likely factor that triggered all this was the long trip to [place]. He probably has had showers and showers and showers of clots and emboli and his pulmonary circulation could not accommodate anymore clots. He ran out of physical space to put more clots and the circulation into the left side of the heart through the pulmonary veins ran out. I pointed out to him that it is like if the main water entrance of the water to the house has a clog, the rest of the house is not going to have any  water. In any event the patient is up and running. He looks terrific. He has no clinical evidence of pulmonary hypertension on clinical examination and it is amazing that he is alive. I think the tolerance to all this is because he is a very fit individual who exercises all the time.      Obviously I am going to need to proceed with thrombophilia assessment that will include factor V Leiden mutation, prothrombin gene mutation, antithrombin III, lupus anticoagulant, anti-glycoprotein antibody profile, anticardiolipin antibody profile as well as protein C, protein S, fibrinogen, lipid profile as well as lupus anticoagulant. I will perform as well a serum protein electrophoresis and serum free light chains.      The patient has had screening colonoscopy in the past, prostate examination being negative normal. No family history of malignancy. No family history of thrombophilia. I need to see this patient in the office in a couple of weeks to go through the report of his laboratory testing. Eventually we will need to repeat a CT angiogram of the chest to be sure that all of his clots in the pulmonary circulation are clean and I am sure Dr. Cantu eventually will perform another echocardiogram to be sure that there is resolution of his pulmonary hypertension by this methodology.      I pointed out to the patient that he will need to stay away from trauma and I made the description in the common sense utilization at this point.      The patient likes to drink his beers. He does not get drunk but I pointed out that anticoagulants are not good friends of alcohol and his wife will take care of this problem.      He raised the question if sex life will have anything to do with all these issues in regard to inability to perform and I pointed out to him that should not be an issue. I asked him to give himself a break until things get better in the next couple of weeks.    The patient had a history of going to [place] to take a  car to his daughter, driving for 20 hours almost nonstop and he developed swelling and pain in the right lower extremity that continued intermittently after he culminated all the issues pertinent to above. My notes from the original consultation were very explicit and described the history of the present illness extremely well in my opinion. In any event, since the patient was discharged from the hospital, he has been getting better and better as time goes by. He has very occasional sensation of flip flop in his heart but no sensation of persistent tachyarrhythmia. No chest pain, no pleuritic pain, no hemoptysis, no cough, no shortness of breath. His appetite has been terrific. He has not had any diarrhea like he was having before and he has no abdominal pain, distention or jaundice. His urination is normal. He has had significant improvement but not complete resolution of the swelling in the right lower extremity with no pain. He has not had any clinical bleeding. He feels extremely well. He has not had any fever or infection. He reminded me that some of these events happened after he proceeded with his COVID vaccination weeks before the trip.    THROMBOPHILIA  CLINICAL AND LABORATORY REPORT:  DATE:    10 /4 /2021     DIAGNOSIS: MASSIVE PE REQUIRING EMBOLECTOMY. DVT RLE      KEY: P EQUAL TO POSITIVE  , N  EQUAL TO NEGATIVE. NA : NOT APLICABLE    PREPARED BY HAYLEY DICKERSON MD STAYS IN THE CHART PERMANENTLY      CLINICAL FACTORS:     OBESITY: ___N___     DIABETES: __N____.    IMMOBILITY: __N____.    SMOKING HISTORY : ______N______ PACK A DAY/ YEARS ________    LONG TRIPS BY CAR OR AIRPLANE: __YES [address]IVE TO [place]______    RECENT SURGERY: ____N______   LOCATION: ________    TRAUMA: ___N_____    MEDICATIONS:   BIRTH CONTROL MEDICATIONS ___N_____ ANDROGENS _N______ ESTROGENS ___N_____ HEPARIN __N______ OTHERS ____N___    PREGNANCY:   ____N____.    FAMILY HISTORY OF BLOOD CLOTHS:    POSITIVE:   ________ NEGATIVE  ___N____ on 3/21/2022 brother has pe after prostate cancer surgery    HEMATOLOGIC DISORDER:  POLYCYTHEMIA VERA ____N___ THROMBOCYTOSIS ___N___    DIC:___N____  TTP ___N_______    COLLAGEN VASCULAR DISEASE: _N____   DIAGNOSIS: ________________________.    VASCULITIS: TYPE AND LOCATION ____N___________ BIOPSY PROVEN:____________    CANCER DIAGNOSIS: __N_____    TYPE ________    CANCER SCREENING:  BREAST _______ COLON ___Y_____ LUNG ________ GENITOURINARY __Y_______   CT SCANS ___________ NORMAL ______ ABNORMAL ________    INDWELLING VASCULAR DEVICE:  ___N______. LOCATION _________    INFLAMMATORY BOWEL DISEASE: ULCERATIVE COLITIS ______N____   CHRON'S DISEASE ___________    RECENT COVID 19 INFECTION __N_____________.          LABORATORY:    FACTOR V LEIDEN MUTATION: POSITIVE   _____ NEGATIVE __Y___ HETEROZYGOUS ________ HOMOZYGOUS ________    PROTHROMBIN GENE MUTATION:  POSITIVE _____ NEGATIVE __Y___    ANTITHROMBIN III : NORMAL _____   ABNORMAL __Y____ QUANTIFICATION: __66_____    PROTEIN S ANTIGEN ____N______ PROTEIN S FUNCTIONAL _____N___ PROTEIN C ___N______     LUPUS ANTICOAGULANT: POSITIVE _____  NEGATIVE _N____. REPEATED  6 WEEKS LATER ______    ANTICARDIOLIPIN ANTIBODY PROFILE : POSITIVE ___Y___  NEGATIVE ______  IGG __N___ IGM _34____ REPEATED 6 WEEKS LATER _________    ANTI GLYCOPROTEIN ANTIBODY:  POSITIVE _____  NEGATIVE  __N_____   IGG _____ IGM _____ REPEATED 6 WEEKS LATER ________    ANTIPHOSPATIDIL SERINE ANTIBODY:  POSITIVE _____ NEGATIVE __N_____    SERUM PROTEIN ELECTROPHORESIS AND IMMUNOFIXATION: NO MONOCLONAL PROTEIN ___N_____ POSITIVE MONOCLONAL PROTEIN ______ TYPE _________    C REACTIVE PROTEIN HIGH SENSIBILITY: NORMAL _______ ABNORMAL :QUANTIFICATION ________    SEDIMENTATION RATE: _____ MM/H.    FIBRINOGEN LEVEL: _________ NORMAL __Y_____  ABNORMAL __________.    LIPID PROFILE:    CHOLESTEROL HIGH _____N_____  TRYGLYCERIDES HIGH  ___N_____    HEPARIN ASSOCIATED ANTIPLATELET ANTIBODY:  __NA_______          Past Medical History:   Diagnosis Date    DVT (deep venous thrombosis)     RLE in the distal femoral, popliteal, posterior tibial, peroneal, and soleal.    Pulmonary emboli         Past Surgical History:   Procedure Laterality Date    APPENDECTOMY      CARDIAC CATHETERIZATION N/A 9/18/2021    Procedure: Percutaneous Manual Thrombectomy- [facility];  Surgeon: Enrike Cantu MD;  Location:  [facility];  Service: Cardiovascular;  Laterality: N/A;    CARDIAC CATHETERIZATION N/A 9/18/2021    Procedure: Right Heart Cath;  Surgeon: Enrike Cantu MD;  Location:  [facility];  Service: Cardiovascular;  Laterality: N/A;    COLONOSCOPY      negative normal    EMBOLECTOMY      INTERVENTIONAL RADIOLOGY PROCEDURE Bilateral 9/18/2021    Procedure: Pumonary angiography -Bilateral;  Surgeon: Enrike Cantu MD;  Location:  [facility];  Service: Cardiovascular;  Laterality: Bilateral;    TONSILLECTOMY          Current Outpatient Medications on File Prior to Visit   Medication Sig Dispense Refill    rivaroxaban (Xarelto) 20 MG tablet TAKE 1 TABLET BY MOUTH EVERY DAY 90 tablet 3    loratadine (CLARITIN) 10 MG tablet Take 1 tablet by mouth Daily.      Triamcinolone Acetonide (NASACORT) 55 MCG/ACT nasal inhaler Administer 2 sprays into the nostril(s) as directed by provider Daily.       No current facility-administered medications on file prior to visit.        ALLERGIES:    Allergies   Allergen Reactions    Penicillins Rash        Social History     Socioeconomic History    Marital status:      Spouse name: Marimar   Tobacco Use    Smoking status: Never    Smokeless tobacco: Never    Tobacco comments:     CAFFEINE -  2 CUPS COFFEE DAILY    Vaping Use    Vaping status: Never Used   Substance and Sexual Activity    Alcohol use: Yes     Alcohol/week: 6.0 standard drinks of alcohol     Types: 6 Cans of beer per week     Comment: occas on weekends    Drug use:  "Never    Sexual activity: Defer        Family History   Problem Relation Age of Onset    Hypertension Mother     Hypertension Father     Suicidality Brother     Hypertension Brother     Prostate cancer Brother     Autism Son           Objective     Vitals:    06/17/25 1545   BP: 112/69   Pulse: 58   Temp: 97.9 °F (36.6 °C)   TempSrc: Oral   SpO2: 97%   Weight: 89.6 kg (197 lb 8 oz)   Height: 175.3 cm (69.02\")   PainSc: 0-No pain           6/17/2025     3:47 PM   Current Status   ECOG score 0       Physical Exam  Constitutional:       General: He is not in acute distress.     Appearance: Normal appearance. He is not ill-appearing.   HENT:      Head: Normocephalic and atraumatic.   Cardiovascular:      Rate and Rhythm: Normal rate and regular rhythm.      Heart sounds: No murmur heard.  Pulmonary:      Effort: Pulmonary effort is normal. No respiratory distress.      Breath sounds: Normal breath sounds.   Musculoskeletal:         General: No swelling.   Neurological:      General: No focal deficit present.      Mental Status: He is alert and oriented to person, place, and time.              RECENT LABS:  Hematology WBC   Date Value Ref Range Status   06/17/2025 9.58 3.40 - 10.80 10*3/mm3 Final     RBC   Date Value Ref Range Status   06/17/2025 4.87 4.14 - 5.80 10*6/mm3 Final     Hemoglobin   Date Value Ref Range Status   06/17/2025 14.7 13.0 - 17.7 g/dL Final     Hematocrit   Date Value Ref Range Status   06/17/2025 44.2 37.5 - 51.0 % Final     Platelets   Date Value Ref Range Status   06/17/2025 189 140 - 450 10*3/mm3 Final       CBC:    WBC   Date Value Ref Range Status   06/17/2025 9.58 3.40 - 10.80 10*3/mm3 Final     RBC   Date Value Ref Range Status   06/17/2025 4.87 4.14 - 5.80 10*6/mm3 Final     Hemoglobin   Date Value Ref Range Status   06/17/2025 14.7 13.0 - 17.7 g/dL Final     Hematocrit   Date Value Ref Range Status   06/17/2025 44.2 37.5 - 51.0 % Final     MCV   Date Value Ref Range Status   06/17/2025 " 90.8 79.0 - 97.0 fL Final     MCH   Date Value Ref Range Status   06/17/2025 30.2 26.6 - 33.0 pg Final     MCHC   Date Value Ref Range Status   06/17/2025 33.3 31.5 - 35.7 g/dL Final     RDW   Date Value Ref Range Status   06/17/2025 12.7 12.3 - 15.4 % Final     RDW-SD   Date Value Ref Range Status   06/17/2025 42.2 37.0 - 54.0 fl Final     MPV   Date Value Ref Range Status   06/17/2025 8.9 6.0 - 12.0 fL Final     Platelets   Date Value Ref Range Status   06/17/2025 189 140 - 450 10*3/mm3 Final     Neutrophil %   Date Value Ref Range Status   06/17/2025 77.4 (H) 42.7 - 76.0 % Final     Lymphocyte %   Date Value Ref Range Status   06/17/2025 13.6 (L) 19.6 - 45.3 % Final     Monocyte %   Date Value Ref Range Status   06/17/2025 7.0 5.0 - 12.0 % Final     Eosinophil %   Date Value Ref Range Status   06/17/2025 1.4 0.3 - 6.2 % Final     Basophil %   Date Value Ref Range Status   06/17/2025 0.4 0.0 - 1.5 % Final     Immature Grans %   Date Value Ref Range Status   06/17/2025 0.2 0.0 - 0.5 % Final     Neutrophils, Absolute   Date Value Ref Range Status   06/17/2025 7.42 (H) 1.70 - 7.00 10*3/mm3 Final     Lymphocytes, Absolute   Date Value Ref Range Status   06/17/2025 1.30 0.70 - 3.10 10*3/mm3 Final     Monocytes, Absolute   Date Value Ref Range Status   06/17/2025 0.67 0.10 - 0.90 10*3/mm3 Final     Eosinophils, Absolute   Date Value Ref Range Status   06/17/2025 0.13 0.00 - 0.40 10*3/mm3 Final     Basophils, Absolute   Date Value Ref Range Status   06/17/2025 0.04 0.00 - 0.20 10*3/mm3 Final     Immature Grans, Absolute   Date Value Ref Range Status   06/17/2025 0.02 0.00 - 0.05 10*3/mm3 Final     nRBC   Date Value Ref Range Status   06/17/2025 0.0 0.0 - 0.2 /100 WBC Final        CMP:    Glucose   Date Value Ref Range Status   06/07/2024 89 65 - 99 mg/dL Final     BUN   Date Value Ref Range Status   06/07/2024 15 8 - 23 mg/dL Final     Creatinine   Date Value Ref Range Status   06/07/2024 0.88 0.76 - 1.27 mg/dL Final      Sodium   Date Value Ref Range Status   06/07/2024 141 136 - 145 mmol/L Final     Potassium   Date Value Ref Range Status   06/07/2024 3.8 3.5 - 5.2 mmol/L Final     Chloride   Date Value Ref Range Status   06/07/2024 104 98 - 107 mmol/L Final     CO2   Date Value Ref Range Status   06/07/2024 23.3 22.0 - 29.0 mmol/L Final     Calcium   Date Value Ref Range Status   06/07/2024 9.2 8.6 - 10.5 mg/dL Final     Total Protein   Date Value Ref Range Status   06/07/2024 7.3 6.0 - 8.5 g/dL Final     Albumin   Date Value Ref Range Status   06/07/2024 4.3 3.5 - 5.2 g/dL Final     ALT (SGPT)   Date Value Ref Range Status   06/07/2024 29 1 - 41 U/L Final     AST (SGOT)   Date Value Ref Range Status   06/07/2024 57 (H) 1 - 40 U/L Final     Alkaline Phosphatase   Date Value Ref Range Status   06/07/2024 94 39 - 117 U/L Final     Total Bilirubin   Date Value Ref Range Status   06/07/2024 0.7 0.0 - 1.2 mg/dL Final     Globulin   Date Value Ref Range Status   06/07/2024 3.0 gm/dL Final     A/G Ratio   Date Value Ref Range Status   06/07/2024 1.4 g/dL Final     BUN/Creatinine Ratio   Date Value Ref Range Status   06/07/2024 17.0 7.0 - 25.0 Final     Anion Gap   Date Value Ref Range Status   06/07/2024 13.7 5.0 - 15.0 mmol/L Final           Assessment and plan  *History of massive bilateral PE/right lower extremity DVT September 2021  *Anticardiolipin IgM antibody positive, persistent  Unclear if DVT/PE was provoked versus unprovoked.  He did have a car ride of 20 hours from [place] with only 1 stop about 3 to 4 weeks prior to being diagnosed with DVT PE.  Wife notes patient had received COVID booster a couple months before probably in July or August 2021 9/17/2021 CT angiogram chest: Extensive bilateral PE with evidence of right heart strain  September 2021: Status post thrombectomy ([facility]) with cardiology  9/19/2021 bilateral lower extremity duplex: Acute right lower extremity DVT in distal femoral, popliteal, posterior tibial  peroneal and soleal veins.  November 2021 duplex: Subacute right lower extremity DVT in popliteal vein. CT angiogram chest-resolution of PE  6/17/2025: Patient established care with me.  Previous Dr. Washington patient.  Currently anticoagulated with Xarelto 20 mg daily.  No bleeding issues.  Discussed decreasing the dose to 10 mg daily with food given that he has completed 6 months of full dose anticoagulation.  He currently has at least 2 months supply of 20 mg daily at home.  Discussed completing it and then starting 10 mg daily.  Prescription for 10 mg Xarelto daily with food sent to his preferred pharmacy. his anticardiolipin IgM antibody has been persistently low to moderately elevated around 20s.  Lupus anticoagulant has been positive intermittently, likely false positive given he has been on Xarelto.  Rest of the hypercoagulable workup was unremarkable    PLAN:  Discussed decreasing the dose to 10 mg daily with food given that he has completed 6 months of full dose anticoagulation.    He currently has at least 2 months supply of 20 mg daily at home.  Discussed completing it and then starting 10 mg daily.  Prescription for 10 mg Xarelto daily with food sent to his preferred pharmacy.  NP visit in 6 months with CBC and CMP    Patient new to me.  All issues new to me.  Reviewed hypercoagulable workup, prior imaging reports and notes from previous oncologist    I spent 40 total minutes, face-to-face, caring for Christopher today. Greater than 50% of this time involved counseling and/or coordination of care as documented within this note.

## 2025-06-17 ENCOUNTER — OFFICE VISIT (OUTPATIENT)
Dept: ONCOLOGY | Facility: CLINIC | Age: 65
End: 2025-06-17
Payer: COMMERCIAL

## 2025-06-17 ENCOUNTER — LAB (OUTPATIENT)
Dept: LAB | Facility: HOSPITAL | Age: 65
End: 2025-06-17
Payer: COMMERCIAL

## 2025-06-17 VITALS
DIASTOLIC BLOOD PRESSURE: 69 MMHG | OXYGEN SATURATION: 97 % | SYSTOLIC BLOOD PRESSURE: 112 MMHG | TEMPERATURE: 97.9 F | HEIGHT: 69 IN | BODY MASS INDEX: 29.25 KG/M2 | HEART RATE: 58 BPM | WEIGHT: 197.5 LBS

## 2025-06-17 DIAGNOSIS — I82.4Z1 ACUTE DEEP VEIN THROMBOSIS (DVT) OF DISTAL VEIN OF RIGHT LOWER EXTREMITY: ICD-10-CM

## 2025-06-17 DIAGNOSIS — R76.0 ANTICARDIOLIPIN ANTIBODY POSITIVE: Primary | ICD-10-CM

## 2025-06-17 DIAGNOSIS — R76.0 ANTICARDIOLIPIN ANTIBODY POSITIVE: ICD-10-CM

## 2025-06-17 LAB
ALBUMIN SERPL-MCNC: 4.5 G/DL (ref 3.5–5.2)
ALBUMIN/GLOB SERPL: 1.8 G/DL
ALP SERPL-CCNC: 89 U/L (ref 39–117)
ALT SERPL W P-5'-P-CCNC: 31 U/L (ref 1–41)
ANION GAP SERPL CALCULATED.3IONS-SCNC: 13 MMOL/L (ref 5–15)
AST SERPL-CCNC: 41 U/L (ref 1–40)
BASOPHILS # BLD AUTO: 0.04 10*3/MM3 (ref 0–0.2)
BASOPHILS NFR BLD AUTO: 0.4 % (ref 0–1.5)
BILIRUB SERPL-MCNC: 1 MG/DL (ref 0–1.2)
BUN SERPL-MCNC: 23.2 MG/DL (ref 8–23)
BUN/CREAT SERPL: 28 (ref 7–25)
CALCIUM SPEC-SCNC: 9.5 MG/DL (ref 8.6–10.5)
CHLORIDE SERPL-SCNC: 103 MMOL/L (ref 98–107)
CO2 SERPL-SCNC: 25 MMOL/L (ref 22–29)
CREAT SERPL-MCNC: 0.83 MG/DL (ref 0.76–1.27)
DEPRECATED RDW RBC AUTO: 42.2 FL (ref 37–54)
EGFRCR SERPLBLD CKD-EPI 2021: 98.3 ML/MIN/1.73
EOSINOPHIL # BLD AUTO: 0.13 10*3/MM3 (ref 0–0.4)
EOSINOPHIL NFR BLD AUTO: 1.4 % (ref 0.3–6.2)
ERYTHROCYTE [DISTWIDTH] IN BLOOD BY AUTOMATED COUNT: 12.7 % (ref 12.3–15.4)
GLOBULIN UR ELPH-MCNC: 2.5 GM/DL
GLUCOSE SERPL-MCNC: 92 MG/DL (ref 65–99)
HCT VFR BLD AUTO: 44.2 % (ref 37.5–51)
HGB BLD-MCNC: 14.7 G/DL (ref 13–17.7)
IMM GRANULOCYTES # BLD AUTO: 0.02 10*3/MM3 (ref 0–0.05)
IMM GRANULOCYTES NFR BLD AUTO: 0.2 % (ref 0–0.5)
LYMPHOCYTES # BLD AUTO: 1.3 10*3/MM3 (ref 0.7–3.1)
LYMPHOCYTES NFR BLD AUTO: 13.6 % (ref 19.6–45.3)
MCH RBC QN AUTO: 30.2 PG (ref 26.6–33)
MCHC RBC AUTO-ENTMCNC: 33.3 G/DL (ref 31.5–35.7)
MCV RBC AUTO: 90.8 FL (ref 79–97)
MONOCYTES # BLD AUTO: 0.67 10*3/MM3 (ref 0.1–0.9)
MONOCYTES NFR BLD AUTO: 7 % (ref 5–12)
NEUTROPHILS NFR BLD AUTO: 7.42 10*3/MM3 (ref 1.7–7)
NEUTROPHILS NFR BLD AUTO: 77.4 % (ref 42.7–76)
NRBC BLD AUTO-RTO: 0 /100 WBC (ref 0–0.2)
PLATELET # BLD AUTO: 189 10*3/MM3 (ref 140–450)
PMV BLD AUTO: 8.9 FL (ref 6–12)
POTASSIUM SERPL-SCNC: 4.5 MMOL/L (ref 3.5–5.2)
PROT SERPL-MCNC: 7 G/DL (ref 6–8.5)
RBC # BLD AUTO: 4.87 10*6/MM3 (ref 4.14–5.8)
SODIUM SERPL-SCNC: 141 MMOL/L (ref 136–145)
WBC NRBC COR # BLD AUTO: 9.58 10*3/MM3 (ref 3.4–10.8)

## 2025-06-17 PROCEDURE — 36415 COLL VENOUS BLD VENIPUNCTURE: CPT

## 2025-06-17 PROCEDURE — 80053 COMPREHEN METABOLIC PANEL: CPT

## 2025-06-17 PROCEDURE — 99214 OFFICE O/P EST MOD 30 MIN: CPT | Performed by: STUDENT IN AN ORGANIZED HEALTH CARE EDUCATION/TRAINING PROGRAM

## 2025-06-17 PROCEDURE — 85025 COMPLETE CBC W/AUTO DIFF WBC: CPT

## 2025-06-18 LAB
CARDIOLIPIN IGG SER IA-ACNC: <9 GPL U/ML (ref 0–14)
CARDIOLIPIN IGM SER IA-ACNC: 20 MPL U/ML (ref 0–12)

## (undated) DEVICE — TRIEVER24 CATHETER, 24 FR, 90 CM, GEN4: Brand: TRIEVER 24

## (undated) DEVICE — INTRO SHEATH DRYSEAL FLEX 24F 8.0TO8.8MM 33CM

## (undated) DEVICE — PERCLOSE PROGLIDE™ SUTURE-MEDIATED CLOSURE SYSTEM: Brand: PERCLOSE PROGLIDE™

## (undated) DEVICE — GW EMR FIX EXCHG J STD .035 3MM 260CM

## (undated) DEVICE — INTRO SHEATH ART/FEM ENGAGE .035 7F12CM

## (undated) DEVICE — CATH PULM WEDGE PRESS 7F

## (undated) DEVICE — INTRO SHEATH ART/FEM ENGAGE .035 6F12CM

## (undated) DEVICE — CATH PULM GPC PE 4SD/PRT 6.7F 100CM

## (undated) DEVICE — RADIFOCUS GLIDEWIRE ADVANTAGE GUIDEWIRE: Brand: GLIDEWIRE ADVANTAGE

## (undated) DEVICE — HI-TORQUE SUPRA CORE .035 PERIPHERAL GUIDE WIRE .035 X 300 CM: Brand: HI-TORQUE SUPRA CORE

## (undated) DEVICE — PK CATH CARD 40

## (undated) DEVICE — HI-TORQUE SUPRA CORE .035 PERIPHERAL GUIDE WIRE .035 X 190 CM: Brand: HI-TORQUE SUPRA CORE

## (undated) DEVICE — KT MANIFLD CARDIAC

## (undated) DEVICE — CAP FLOW TRIEVER INARI MEDICAL

## (undated) DEVICE — Device

## (undated) DEVICE — FLOWSAVER: Brand: FLOWSAVER

## (undated) DEVICE — GW XCHG AMPLTZ XSTIF PTFE CRV .035 3X260